# Patient Record
Sex: MALE | Race: WHITE | NOT HISPANIC OR LATINO | Employment: OTHER | URBAN - NONMETROPOLITAN AREA
[De-identification: names, ages, dates, MRNs, and addresses within clinical notes are randomized per-mention and may not be internally consistent; named-entity substitution may affect disease eponyms.]

---

## 2023-03-03 PROBLEM — M54.16 LUMBAR RADICULOPATHY: Status: ACTIVE | Noted: 2023-03-03

## 2023-03-03 PROBLEM — E03.9 HYPOTHYROIDISM: Status: ACTIVE | Noted: 2023-03-03

## 2023-03-03 PROBLEM — M79.642 LEFT HAND PAIN: Status: ACTIVE | Noted: 2023-03-03

## 2023-03-03 PROBLEM — E55.9 VITAMIN D DEFICIENCY: Status: ACTIVE | Noted: 2023-03-03

## 2023-03-03 PROBLEM — L60.0 INGROWN LEFT BIG TOENAIL: Status: ACTIVE | Noted: 2023-03-03

## 2023-03-03 PROBLEM — R00.1 BRADYCARDIA: Status: ACTIVE | Noted: 2023-03-03

## 2023-03-03 PROBLEM — R42 DIZZY SPELLS: Status: ACTIVE | Noted: 2023-03-03

## 2023-03-03 PROBLEM — Z95.1 HISTORY OF CORONARY ARTERY BYPASS GRAFT X 1: Status: ACTIVE | Noted: 2023-03-03

## 2023-03-03 PROBLEM — N40.0 BENIGN ENLARGEMENT OF PROSTATE: Status: ACTIVE | Noted: 2023-03-03

## 2023-03-03 PROBLEM — M25.512 LEFT SHOULDER PAIN: Status: ACTIVE | Noted: 2023-03-03

## 2023-03-03 PROBLEM — M43.10 DEGENERATIVE SPONDYLOLISTHESIS: Status: ACTIVE | Noted: 2023-03-03

## 2023-03-03 PROBLEM — M25.561 BILATERAL KNEE PAIN: Status: ACTIVE | Noted: 2023-03-03

## 2023-03-03 PROBLEM — N64.4 MASTALGIA: Status: ACTIVE | Noted: 2023-03-03

## 2023-03-03 PROBLEM — D49.2 SKIN NEOPLASM: Status: ACTIVE | Noted: 2023-03-03

## 2023-03-03 PROBLEM — M51.36 DISC DEGENERATION, LUMBAR: Status: ACTIVE | Noted: 2023-03-03

## 2023-03-03 PROBLEM — R42 VERTIGO: Status: ACTIVE | Noted: 2023-03-03

## 2023-03-03 PROBLEM — Z95.2 MECHANICAL HEART VALVE PRESENT: Status: ACTIVE | Noted: 2023-03-03

## 2023-03-03 PROBLEM — J45.21 MILD INTERMITTENT ASTHMA WITH ACUTE EXACERBATION (HHS-HCC): Status: ACTIVE | Noted: 2023-03-03

## 2023-03-03 PROBLEM — M54.50 LOWER BACK PAIN: Status: ACTIVE | Noted: 2023-03-03

## 2023-03-03 PROBLEM — M06.4 INFLAMMATORY POLYARTHROPATHY (MULTI): Status: ACTIVE | Noted: 2023-03-03

## 2023-03-03 PROBLEM — R07.9 CHEST PAIN: Status: ACTIVE | Noted: 2023-03-03

## 2023-03-03 PROBLEM — H54.61 VISUAL LOSS, RIGHT EYE: Status: ACTIVE | Noted: 2023-03-03

## 2023-03-03 PROBLEM — H10.33 ACUTE BACTERIAL CONJUNCTIVITIS OF BOTH EYES: Status: ACTIVE | Noted: 2023-03-03

## 2023-03-03 PROBLEM — I25.10 CORONARY ARTERY DISEASE: Status: ACTIVE | Noted: 2023-03-03

## 2023-03-03 PROBLEM — E66.9 CLASS 2 OBESITY WITH BODY MASS INDEX (BMI) OF 39.0 TO 39.9 IN ADULT: Status: ACTIVE | Noted: 2023-03-03

## 2023-03-03 PROBLEM — R35.0 URINARY FREQUENCY: Status: ACTIVE | Noted: 2023-03-03

## 2023-03-03 PROBLEM — M54.40 BILATERAL LOW BACK PAIN WITH SCIATICA: Status: ACTIVE | Noted: 2023-03-03

## 2023-03-03 PROBLEM — R31.9 HEMATURIA: Status: ACTIVE | Noted: 2023-03-03

## 2023-03-03 PROBLEM — Z04.9 CONDITION NOT FOUND: Status: ACTIVE | Noted: 2023-03-03

## 2023-03-03 PROBLEM — R06.02 SHORTNESS OF BREATH AT REST: Status: ACTIVE | Noted: 2023-03-03

## 2023-03-03 PROBLEM — N39.0 URINARY TRACT INFECTION: Status: ACTIVE | Noted: 2023-03-03

## 2023-03-03 PROBLEM — J30.9 ALLERGIC RHINITIS: Status: ACTIVE | Noted: 2023-03-03

## 2023-03-03 PROBLEM — M25.562 BILATERAL KNEE PAIN: Status: ACTIVE | Noted: 2023-03-03

## 2023-03-03 PROBLEM — B35.4 TINEA CORPORIS: Status: ACTIVE | Noted: 2023-03-03

## 2023-03-03 PROBLEM — G25.0 TREMOR, ESSENTIAL: Status: ACTIVE | Noted: 2023-03-03

## 2023-03-03 PROBLEM — M48.02 DEGENERATIVE CERVICAL SPINAL STENOSIS: Status: ACTIVE | Noted: 2023-03-03

## 2023-03-03 PROBLEM — M75.120 COMPLETE ROTATOR CUFF TEAR: Status: ACTIVE | Noted: 2023-03-03

## 2023-03-03 PROBLEM — I83.90 VARICOSE VEIN OF LEG: Status: ACTIVE | Noted: 2023-03-03

## 2023-03-03 PROBLEM — G60.9 HEREDITARY AND IDIOPATHIC NEUROPATHY: Status: ACTIVE | Noted: 2023-03-03

## 2023-03-03 PROBLEM — M51.369 DISC DEGENERATION, LUMBAR: Status: ACTIVE | Noted: 2023-03-03

## 2023-03-03 PROBLEM — M25.559 JOINT PAIN, HIP: Status: ACTIVE | Noted: 2023-03-03

## 2023-03-03 PROBLEM — R42 ORTHOSTATIC DIZZINESS: Status: ACTIVE | Noted: 2023-03-03

## 2023-03-03 PROBLEM — F32.0 CURRENT MILD EPISODE OF MAJOR DEPRESSIVE DISORDER WITHOUT PRIOR EPISODE (CMS-HCC): Status: ACTIVE | Noted: 2023-03-03

## 2023-03-03 PROBLEM — R53.1 WEAKNESS: Status: ACTIVE | Noted: 2023-03-03

## 2023-03-03 PROBLEM — G47.00 INSOMNIA: Status: ACTIVE | Noted: 2023-03-03

## 2023-03-03 PROBLEM — I95.89 SECONDARY HYPOTENSION: Status: ACTIVE | Noted: 2023-03-03

## 2023-03-03 PROBLEM — M47.812 CERVICAL ARTHRITIS: Status: ACTIVE | Noted: 2023-03-03

## 2023-03-03 PROBLEM — M19.029 PRIMARY LOCALIZED OSTEOARTHROSIS, UPPER ARM: Status: ACTIVE | Noted: 2023-03-03

## 2023-03-03 PROBLEM — R25.1 TREMOR OF RIGHT HAND: Status: ACTIVE | Noted: 2023-03-03

## 2023-03-03 PROBLEM — N63.0 LUMP OR MASS IN BREAST: Status: ACTIVE | Noted: 2023-03-03

## 2023-03-03 PROBLEM — E11.9 CONTROLLED TYPE 2 DIABETES MELLITUS WITHOUT COMPLICATION, WITHOUT LONG-TERM CURRENT USE OF INSULIN (MULTI): Status: ACTIVE | Noted: 2023-03-03

## 2023-03-03 PROBLEM — E78.5 HYPERLIPIDEMIA: Status: ACTIVE | Noted: 2023-03-03

## 2023-03-03 PROBLEM — L60.0 INGROWING NAIL: Status: ACTIVE | Noted: 2023-03-03

## 2023-03-03 PROBLEM — B37.2 CANDIDIASIS, CUTANEOUS: Status: ACTIVE | Noted: 2023-03-03

## 2023-03-03 PROBLEM — E78.00 ELEVATED LDL CHOLESTEROL LEVEL: Status: ACTIVE | Noted: 2023-03-03

## 2023-03-03 PROBLEM — I73.9 PERIPHERAL VASCULAR DISEASE (CMS-HCC): Status: ACTIVE | Noted: 2023-03-03

## 2023-03-03 PROBLEM — G47.33 OBSTRUCTIVE SLEEP APNEA ON CPAP: Status: ACTIVE | Noted: 2023-03-03

## 2023-03-03 PROBLEM — R35.0 FREQUENCY OF URINATION: Status: ACTIVE | Noted: 2023-03-03

## 2023-03-03 PROBLEM — R26.89 IMBALANCE: Status: ACTIVE | Noted: 2023-03-03

## 2023-03-03 PROBLEM — R97.20 ABNORMAL PSA: Status: ACTIVE | Noted: 2023-03-03

## 2023-03-03 PROBLEM — J45.990 EXERTIONAL ASTHMA (HHS-HCC): Status: ACTIVE | Noted: 2023-03-03

## 2023-03-03 PROBLEM — M25.531 RIGHT WRIST PAIN: Status: ACTIVE | Noted: 2023-03-03

## 2023-03-03 PROBLEM — E66.9 OBESITY (BMI 30-39.9): Status: ACTIVE | Noted: 2023-03-03

## 2023-03-03 PROBLEM — M13.0 POLYARTHRITIS: Status: ACTIVE | Noted: 2023-03-03

## 2023-03-03 PROBLEM — M48.062 NEUROGENIC CLAUDICATION DUE TO LUMBAR SPINAL STENOSIS: Status: ACTIVE | Noted: 2023-03-03

## 2023-03-03 PROBLEM — M51.16 DISPLACEMENT OF LUMBAR DISC WITH RADICULOPATHY: Status: ACTIVE | Noted: 2023-03-03

## 2023-03-03 PROBLEM — E66.812 CLASS 2 OBESITY WITH BODY MASS INDEX (BMI) OF 39.0 TO 39.9 IN ADULT: Status: ACTIVE | Noted: 2023-03-03

## 2023-03-03 PROBLEM — M67.432 GANGLION OF LEFT WRIST: Status: ACTIVE | Noted: 2023-03-03

## 2023-03-03 PROBLEM — R25.1 TREMOR OF BOTH HANDS: Status: ACTIVE | Noted: 2023-03-03

## 2023-03-03 PROBLEM — L21.9 SEBORRHEIC DERMATITIS: Status: ACTIVE | Noted: 2023-03-03

## 2023-03-03 PROBLEM — I10 BENIGN ESSENTIAL HYPERTENSION: Status: ACTIVE | Noted: 2023-03-03

## 2023-03-03 PROBLEM — S46.911A RIGHT SHOULDER STRAIN: Status: ACTIVE | Noted: 2023-03-03

## 2023-03-03 PROBLEM — G95.9 CERVICAL MYELOPATHY (MULTI): Status: ACTIVE | Noted: 2023-03-03

## 2023-03-03 PROBLEM — S52.501D CLOSED FRACTURE OF LOWER END OF RIGHT RADIUS WITH ROUTINE HEALING: Status: ACTIVE | Noted: 2023-03-03

## 2023-03-03 PROBLEM — M25.532 LEFT WRIST PAIN: Status: ACTIVE | Noted: 2023-03-03

## 2023-03-03 RX ORDER — ROSUVASTATIN CALCIUM 20 MG/1
1 TABLET, COATED ORAL DAILY
COMMUNITY
Start: 2020-04-07 | End: 2023-03-10 | Stop reason: SDUPTHER

## 2023-03-03 RX ORDER — FLUTICASONE PROPIONATE AND SALMETEROL 250; 50 UG/1; UG/1
1 POWDER RESPIRATORY (INHALATION)
COMMUNITY
Start: 2022-06-07 | End: 2023-10-04 | Stop reason: SDUPTHER

## 2023-03-03 RX ORDER — FOLIC ACID 1 MG/1
1 TABLET ORAL DAILY
COMMUNITY
Start: 2018-04-09 | End: 2023-04-09

## 2023-03-03 RX ORDER — BLOOD SUGAR DIAGNOSTIC
1 STRIP MISCELLANEOUS DAILY
COMMUNITY
Start: 2020-11-20 | End: 2023-11-26 | Stop reason: SDUPTHER

## 2023-03-03 RX ORDER — WARFARIN SODIUM 5 MG/1
1.5 TABLET ORAL
COMMUNITY
Start: 2012-06-11 | End: 2023-03-10 | Stop reason: SDUPTHER

## 2023-03-03 RX ORDER — FLUOXETINE HYDROCHLORIDE 20 MG/1
1 CAPSULE ORAL DAILY
COMMUNITY
Start: 2020-10-06 | End: 2023-03-10 | Stop reason: ALTCHOICE

## 2023-03-03 RX ORDER — FUROSEMIDE 20 MG/1
20 TABLET ORAL DAILY
COMMUNITY
End: 2023-03-10 | Stop reason: SDUPTHER

## 2023-03-03 RX ORDER — VIT C/E/ZN/COPPR/LUTEIN/ZEAXAN 250MG-90MG
1 CAPSULE ORAL DAILY
COMMUNITY
End: 2023-05-03 | Stop reason: WASHOUT

## 2023-03-03 RX ORDER — PREGABALIN 200 MG/1
1 CAPSULE ORAL NIGHTLY
COMMUNITY
Start: 2015-12-15 | End: 2023-03-10 | Stop reason: SDUPTHER

## 2023-03-03 RX ORDER — PSYLLIUM HUSK 0.4 G
1 CAPSULE ORAL 2 TIMES DAILY
COMMUNITY
Start: 2014-10-02 | End: 2024-04-18 | Stop reason: ENTERED-IN-ERROR

## 2023-03-03 RX ORDER — HYDROCODONE BITARTRATE AND ACETAMINOPHEN 5; 325 MG/1; MG/1
1 TABLET ORAL 3 TIMES DAILY PRN
COMMUNITY
Start: 2022-11-14 | End: 2023-10-09 | Stop reason: SDUPTHER

## 2023-03-03 RX ORDER — LEVOTHYROXINE SODIUM 137 UG/1
1 TABLET ORAL DAILY
COMMUNITY
Start: 2018-07-23 | End: 2023-03-10 | Stop reason: SDUPTHER

## 2023-03-03 RX ORDER — LOSARTAN POTASSIUM 100 MG/1
1 TABLET ORAL DAILY
COMMUNITY
Start: 2019-10-22 | End: 2023-03-10 | Stop reason: SDUPTHER

## 2023-03-03 RX ORDER — LEFLUNOMIDE 20 MG/1
1 TABLET ORAL DAILY
COMMUNITY
Start: 2021-08-05 | End: 2023-03-10 | Stop reason: SDUPTHER

## 2023-03-03 RX ORDER — METFORMIN HYDROCHLORIDE 850 MG/1
1 TABLET ORAL 2 TIMES DAILY
COMMUNITY
Start: 2018-10-26 | End: 2023-03-10 | Stop reason: ALTCHOICE

## 2023-03-03 RX ORDER — INSULIN PUMP SYRINGE, 3 ML
EACH MISCELLANEOUS
COMMUNITY

## 2023-03-03 RX ORDER — LANCETS 33 GAUGE
1 EACH MISCELLANEOUS DAILY
COMMUNITY
End: 2023-11-26 | Stop reason: SDUPTHER

## 2023-03-03 RX ORDER — ASPIRIN 81 MG/1
1 TABLET ORAL EVERY MORNING
COMMUNITY
Start: 2019-10-22 | End: 2024-04-23 | Stop reason: HOSPADM

## 2023-03-10 DIAGNOSIS — E78.00 ELEVATED LDL CHOLESTEROL LEVEL: ICD-10-CM

## 2023-03-10 DIAGNOSIS — M06.4 INFLAMMATORY POLYARTHROPATHY (MULTI): ICD-10-CM

## 2023-03-10 DIAGNOSIS — G60.9 HEREDITARY AND IDIOPATHIC NEUROPATHY: ICD-10-CM

## 2023-03-10 DIAGNOSIS — E03.9 ACQUIRED HYPOTHYROIDISM: ICD-10-CM

## 2023-03-10 DIAGNOSIS — I10 BENIGN ESSENTIAL HYPERTENSION: ICD-10-CM

## 2023-03-10 DIAGNOSIS — E11.9 CONTROLLED TYPE 2 DIABETES MELLITUS WITHOUT COMPLICATION, WITHOUT LONG-TERM CURRENT USE OF INSULIN (MULTI): ICD-10-CM

## 2023-03-10 DIAGNOSIS — M54.16 LUMBAR RADICULOPATHY: Primary | ICD-10-CM

## 2023-03-10 DIAGNOSIS — F32.1 CURRENT MODERATE EPISODE OF MAJOR DEPRESSIVE DISORDER WITHOUT PRIOR EPISODE (MULTI): ICD-10-CM

## 2023-03-10 DIAGNOSIS — Z95.2 MECHANICAL HEART VALVE PRESENT: ICD-10-CM

## 2023-03-10 RX ORDER — ROSUVASTATIN CALCIUM 20 MG/1
20 TABLET, COATED ORAL DAILY
Qty: 90 TABLET | Refills: 0 | Status: SHIPPED | OUTPATIENT
Start: 2023-03-10 | End: 2023-05-03

## 2023-03-10 RX ORDER — PREGABALIN 200 MG/1
200 CAPSULE ORAL NIGHTLY
Qty: 90 CAPSULE | Refills: 0 | Status: SHIPPED | OUTPATIENT
Start: 2023-03-10 | End: 2023-04-28

## 2023-03-10 RX ORDER — WARFARIN SODIUM 5 MG/1
TABLET ORAL
Qty: 125 TABLET | Refills: 1 | Status: SHIPPED | OUTPATIENT
Start: 2023-03-11 | End: 2023-09-26

## 2023-03-10 RX ORDER — LOSARTAN POTASSIUM 100 MG/1
100 TABLET ORAL DAILY
Qty: 90 TABLET | Refills: 0 | Status: SHIPPED | OUTPATIENT
Start: 2023-03-10 | End: 2023-05-15

## 2023-03-10 RX ORDER — METFORMIN HYDROCHLORIDE 850 MG/1
850 TABLET ORAL 2 TIMES DAILY
COMMUNITY
End: 2023-03-10 | Stop reason: ALTCHOICE

## 2023-03-10 RX ORDER — EMPAGLIFLOZIN 10 MG/1
1 TABLET, FILM COATED ORAL DAILY
COMMUNITY
Start: 2022-03-30 | End: 2023-05-03 | Stop reason: WASHOUT

## 2023-03-10 RX ORDER — FLUOXETINE 20 MG/1
20 TABLET ORAL DAILY
COMMUNITY
End: 2023-03-10 | Stop reason: SDUPTHER

## 2023-03-10 RX ORDER — LEFLUNOMIDE 20 MG/1
20 TABLET ORAL DAILY
Qty: 90 TABLET | Refills: 0 | Status: SHIPPED | OUTPATIENT
Start: 2023-03-10 | End: 2023-05-03 | Stop reason: SDUPTHER

## 2023-03-10 RX ORDER — FLUOXETINE 20 MG/1
20 TABLET ORAL DAILY
Qty: 90 TABLET | Refills: 0 | Status: SHIPPED | OUTPATIENT
Start: 2023-03-10 | End: 2023-05-03 | Stop reason: SDUPTHER

## 2023-03-10 RX ORDER — METFORMIN HYDROCHLORIDE 850 MG/1
850 TABLET ORAL
Qty: 180 TABLET | Refills: 0 | Status: SHIPPED | OUTPATIENT
Start: 2023-03-10 | End: 2023-05-15

## 2023-03-10 RX ORDER — METFORMIN HYDROCHLORIDE 850 MG/1
850 TABLET ORAL 2 TIMES DAILY
Qty: 180 TABLET | Refills: 0 | Status: SHIPPED | OUTPATIENT
Start: 2023-03-10 | End: 2023-05-03 | Stop reason: WASHOUT

## 2023-03-10 RX ORDER — FUROSEMIDE 20 MG/1
20 TABLET ORAL DAILY
Qty: 90 TABLET | Refills: 0 | Status: SHIPPED | OUTPATIENT
Start: 2023-03-10 | End: 2023-05-11

## 2023-03-10 RX ORDER — LEVOTHYROXINE SODIUM 137 UG/1
137 TABLET ORAL DAILY
Qty: 90 TABLET | Refills: 0 | Status: SHIPPED | OUTPATIENT
Start: 2023-03-10 | End: 2023-05-15

## 2023-03-15 ENCOUNTER — CLINICAL SUPPORT (OUTPATIENT)
Dept: PRIMARY CARE | Facility: CLINIC | Age: 73
End: 2023-03-15
Payer: MEDICARE

## 2023-03-15 DIAGNOSIS — Z95.2 HISTORY OF HEART VALVE REPLACEMENT WITH MECHANICAL VALVE: ICD-10-CM

## 2023-03-15 LAB
POC INR: 2.6
POC PROTHROMBIN TIME: NORMAL

## 2023-03-15 PROCEDURE — 85610 PROTHROMBIN TIME: CPT | Performed by: FAMILY MEDICINE

## 2023-03-20 LAB
POC INR: 2.6 (ref 0.9–1.1)
POC PROTHROMBIN TIME: 30.7 (ref 9.3–12.5)

## 2023-04-08 DIAGNOSIS — M06.4 INFLAMMATORY POLYARTHROPATHY (MULTI): Primary | ICD-10-CM

## 2023-04-09 RX ORDER — FOLIC ACID 1 MG/1
TABLET ORAL
Qty: 90 TABLET | Refills: 0 | Status: SHIPPED | OUTPATIENT
Start: 2023-04-09 | End: 2023-05-03 | Stop reason: SDUPTHER

## 2023-04-12 ENCOUNTER — CLINICAL SUPPORT (OUTPATIENT)
Dept: PRIMARY CARE | Facility: CLINIC | Age: 73
End: 2023-04-12
Payer: MEDICARE

## 2023-04-12 ENCOUNTER — ANTICOAGULATION - WARFARIN VISIT (OUTPATIENT)
Dept: PRIMARY CARE | Facility: CLINIC | Age: 73
End: 2023-04-12
Payer: MEDICARE

## 2023-04-12 DIAGNOSIS — I73.9 PVD (PERIPHERAL VASCULAR DISEASE) (CMS-HCC): Primary | ICD-10-CM

## 2023-04-12 DIAGNOSIS — I73.9 PERIPHERAL VASCULAR DISEASE (CMS-HCC): ICD-10-CM

## 2023-04-12 LAB
POC INR: 3.2
POC PROTHROMBIN TIME: 37.9

## 2023-04-12 PROCEDURE — 85610 PROTHROMBIN TIME: CPT | Performed by: FAMILY MEDICINE

## 2023-04-27 DIAGNOSIS — G60.9 HEREDITARY AND IDIOPATHIC NEUROPATHY: ICD-10-CM

## 2023-04-28 RX ORDER — PREGABALIN 200 MG/1
CAPSULE ORAL
Qty: 90 CAPSULE | Refills: 0 | Status: SHIPPED | OUTPATIENT
Start: 2023-04-28 | End: 2023-05-03 | Stop reason: SDUPTHER

## 2023-05-03 ENCOUNTER — OFFICE VISIT (OUTPATIENT)
Dept: PRIMARY CARE | Facility: CLINIC | Age: 73
End: 2023-05-03
Payer: MEDICARE

## 2023-05-03 VITALS
DIASTOLIC BLOOD PRESSURE: 85 MMHG | BODY MASS INDEX: 38.47 KG/M2 | HEART RATE: 60 BPM | HEIGHT: 72 IN | OXYGEN SATURATION: 97 % | WEIGHT: 284 LBS | SYSTOLIC BLOOD PRESSURE: 120 MMHG

## 2023-05-03 DIAGNOSIS — F32.0 CURRENT MILD EPISODE OF MAJOR DEPRESSIVE DISORDER WITHOUT PRIOR EPISODE (CMS-HCC): ICD-10-CM

## 2023-05-03 DIAGNOSIS — G60.9 HEREDITARY AND IDIOPATHIC NEUROPATHY: ICD-10-CM

## 2023-05-03 DIAGNOSIS — M79.10 MYALGIA: ICD-10-CM

## 2023-05-03 DIAGNOSIS — M51.16 DISPLACEMENT OF LUMBAR DISC WITH RADICULOPATHY: ICD-10-CM

## 2023-05-03 DIAGNOSIS — I10 BENIGN ESSENTIAL HYPERTENSION: ICD-10-CM

## 2023-05-03 DIAGNOSIS — E03.9 ACQUIRED HYPOTHYROIDISM: ICD-10-CM

## 2023-05-03 DIAGNOSIS — N40.1 BENIGN PROSTATIC HYPERPLASIA WITH URINARY FREQUENCY: ICD-10-CM

## 2023-05-03 DIAGNOSIS — E11.9 CONTROLLED TYPE 2 DIABETES MELLITUS WITHOUT COMPLICATION, WITHOUT LONG-TERM CURRENT USE OF INSULIN (MULTI): ICD-10-CM

## 2023-05-03 DIAGNOSIS — R35.0 BENIGN PROSTATIC HYPERPLASIA WITH URINARY FREQUENCY: ICD-10-CM

## 2023-05-03 DIAGNOSIS — I25.10 CORONARY ARTERY DISEASE INVOLVING NATIVE CORONARY ARTERY OF NATIVE HEART WITHOUT ANGINA PECTORIS: ICD-10-CM

## 2023-05-03 DIAGNOSIS — M13.0 POLYARTHRITIS: ICD-10-CM

## 2023-05-03 DIAGNOSIS — M06.4 INFLAMMATORY POLYARTHROPATHY (MULTI): ICD-10-CM

## 2023-05-03 DIAGNOSIS — Z95.2 MECHANICAL HEART VALVE PRESENT: ICD-10-CM

## 2023-05-03 DIAGNOSIS — M48.02 DEGENERATIVE CERVICAL SPINAL STENOSIS: Primary | ICD-10-CM

## 2023-05-03 LAB
ALANINE AMINOTRANSFERASE (SGPT) (U/L) IN SER/PLAS: 18 U/L (ref 10–52)
ALBUMIN (G/DL) IN SER/PLAS: 4.2 G/DL (ref 3.4–5)
ALBUMIN (MG/L) IN URINE: 17.3 MG/L
ALBUMIN/CREATININE (UG/MG) IN URINE: 21 UG/MG CRT (ref 0–30)
ALKALINE PHOSPHATASE (U/L) IN SER/PLAS: 100 U/L (ref 33–136)
ANION GAP IN SER/PLAS: 14 MMOL/L (ref 10–20)
ASPARTATE AMINOTRANSFERASE (SGOT) (U/L) IN SER/PLAS: 20 U/L (ref 9–39)
BASOPHILS (10*3/UL) IN BLOOD BY AUTOMATED COUNT: 0.05 X10E9/L (ref 0–0.1)
BASOPHILS/100 LEUKOCYTES IN BLOOD BY AUTOMATED COUNT: 1 % (ref 0–2)
BILIRUBIN TOTAL (MG/DL) IN SER/PLAS: 0.5 MG/DL (ref 0–1.2)
C REACTIVE PROTEIN (MG/L) IN SER/PLAS: 0.51 MG/DL
CALCIUM (MG/DL) IN SER/PLAS: 9.1 MG/DL (ref 8.6–10.3)
CARBON DIOXIDE, TOTAL (MMOL/L) IN SER/PLAS: 22 MMOL/L (ref 21–32)
CHLORIDE (MMOL/L) IN SER/PLAS: 103 MMOL/L (ref 98–107)
CHOLESTEROL (MG/DL) IN SER/PLAS: 177 MG/DL (ref 0–199)
CHOLESTEROL IN HDL (MG/DL) IN SER/PLAS: 42.1 MG/DL
CHOLESTEROL/HDL RATIO: 4.2
CREATINE KINASE (U/L) IN SER/PLAS: 175 U/L (ref 0–325)
CREATININE (MG/DL) IN SER/PLAS: 1.1 MG/DL (ref 0.5–1.3)
CREATININE (MG/DL) IN URINE: 82.4 MG/DL (ref 20–370)
EOSINOPHILS (10*3/UL) IN BLOOD BY AUTOMATED COUNT: 0.17 X10E9/L (ref 0–0.4)
EOSINOPHILS/100 LEUKOCYTES IN BLOOD BY AUTOMATED COUNT: 3.2 % (ref 0–6)
ERYTHROCYTE DISTRIBUTION WIDTH (RATIO) BY AUTOMATED COUNT: 14.2 % (ref 11.5–14.5)
ERYTHROCYTE MEAN CORPUSCULAR HEMOGLOBIN CONCENTRATION (G/DL) BY AUTOMATED: 32.3 G/DL (ref 32–36)
ERYTHROCYTE MEAN CORPUSCULAR VOLUME (FL) BY AUTOMATED COUNT: 95 FL (ref 80–100)
ERYTHROCYTES (10*6/UL) IN BLOOD BY AUTOMATED COUNT: 4.92 X10E12/L (ref 4.5–5.9)
GFR MALE: 71 ML/MIN/1.73M2
GLUCOSE (MG/DL) IN SER/PLAS: 125 MG/DL (ref 74–99)
HEMATOCRIT (%) IN BLOOD BY AUTOMATED COUNT: 46.5 % (ref 41–52)
HEMOGLOBIN (G/DL) IN BLOOD: 15 G/DL (ref 13.5–17.5)
IMMATURE GRANULOCYTES/100 LEUKOCYTES IN BLOOD BY AUTOMATED COUNT: 0.4 % (ref 0–0.9)
LDL: ABNORMAL MG/DL (ref 0–99)
LEUKOCYTES (10*3/UL) IN BLOOD BY AUTOMATED COUNT: 5.3 X10E9/L (ref 4.4–11.3)
LYMPHOCYTES (10*3/UL) IN BLOOD BY AUTOMATED COUNT: 1.42 X10E9/L (ref 0.8–3)
LYMPHOCYTES/100 LEUKOCYTES IN BLOOD BY AUTOMATED COUNT: 27 % (ref 13–44)
MONOCYTES (10*3/UL) IN BLOOD BY AUTOMATED COUNT: 0.64 X10E9/L (ref 0.05–0.8)
MONOCYTES/100 LEUKOCYTES IN BLOOD BY AUTOMATED COUNT: 12.2 % (ref 2–10)
NEUTROPHILS (10*3/UL) IN BLOOD BY AUTOMATED COUNT: 2.95 X10E9/L (ref 1.6–5.5)
NEUTROPHILS/100 LEUKOCYTES IN BLOOD BY AUTOMATED COUNT: 56.2 % (ref 40–80)
NON HDL CHOLESTEROL: 135 MG/DL
PLATELETS (10*3/UL) IN BLOOD AUTOMATED COUNT: 249 X10E9/L (ref 150–450)
POC HEMOGLOBIN A1C: 6.3 % (ref 4.2–6.5)
POC INR: 2.9
POC PROTHROMBIN TIME: NORMAL
POTASSIUM (MMOL/L) IN SER/PLAS: 4.3 MMOL/L (ref 3.5–5.3)
PROSTATE SPECIFIC AG (NG/ML) IN SER/PLAS: 2.7 NG/ML (ref 0–4)
PROTEIN TOTAL: 6.7 G/DL (ref 6.4–8.2)
SEDIMENTATION RATE, ERYTHROCYTE: 15 MM/H (ref 0–20)
SODIUM (MMOL/L) IN SER/PLAS: 135 MMOL/L (ref 136–145)
THYROTROPIN (MIU/L) IN SER/PLAS BY DETECTION LIMIT <= 0.05 MIU/L: 5.49 MIU/L (ref 0.44–3.98)
TRIGLYCERIDE (MG/DL) IN SER/PLAS: 493 MG/DL (ref 0–149)
UREA NITROGEN (MG/DL) IN SER/PLAS: 24 MG/DL (ref 6–23)
VLDL: ABNORMAL MG/DL (ref 0–40)

## 2023-05-03 PROCEDURE — 1160F RVW MEDS BY RX/DR IN RCRD: CPT | Performed by: FAMILY MEDICINE

## 2023-05-03 PROCEDURE — 84153 ASSAY OF PSA TOTAL: CPT

## 2023-05-03 PROCEDURE — 85025 COMPLETE CBC W/AUTO DIFF WBC: CPT

## 2023-05-03 PROCEDURE — 99215 OFFICE O/P EST HI 40 MIN: CPT | Performed by: FAMILY MEDICINE

## 2023-05-03 PROCEDURE — 80061 LIPID PANEL: CPT

## 2023-05-03 PROCEDURE — 82043 UR ALBUMIN QUANTITATIVE: CPT

## 2023-05-03 PROCEDURE — 36415 COLL VENOUS BLD VENIPUNCTURE: CPT

## 2023-05-03 PROCEDURE — 83036 HEMOGLOBIN GLYCOSYLATED A1C: CPT | Performed by: FAMILY MEDICINE

## 2023-05-03 PROCEDURE — 1036F TOBACCO NON-USER: CPT | Performed by: FAMILY MEDICINE

## 2023-05-03 PROCEDURE — 4010F ACE/ARB THERAPY RXD/TAKEN: CPT | Performed by: FAMILY MEDICINE

## 2023-05-03 PROCEDURE — 86140 C-REACTIVE PROTEIN: CPT

## 2023-05-03 PROCEDURE — 1159F MED LIST DOCD IN RCRD: CPT | Performed by: FAMILY MEDICINE

## 2023-05-03 PROCEDURE — 3079F DIAST BP 80-89 MM HG: CPT | Performed by: FAMILY MEDICINE

## 2023-05-03 PROCEDURE — 82570 ASSAY OF URINE CREATININE: CPT

## 2023-05-03 PROCEDURE — 3074F SYST BP LT 130 MM HG: CPT | Performed by: FAMILY MEDICINE

## 2023-05-03 PROCEDURE — 84443 ASSAY THYROID STIM HORMONE: CPT

## 2023-05-03 PROCEDURE — 85652 RBC SED RATE AUTOMATED: CPT

## 2023-05-03 PROCEDURE — 82550 ASSAY OF CK (CPK): CPT

## 2023-05-03 PROCEDURE — 80053 COMPREHEN METABOLIC PANEL: CPT

## 2023-05-03 PROCEDURE — 85610 PROTHROMBIN TIME: CPT | Performed by: FAMILY MEDICINE

## 2023-05-03 RX ORDER — LEFLUNOMIDE 20 MG/1
20 TABLET ORAL DAILY
Qty: 90 TABLET | Refills: 0 | Status: SHIPPED | OUTPATIENT
Start: 2023-05-03 | End: 2023-10-04 | Stop reason: SDUPTHER

## 2023-05-03 RX ORDER — FOLIC ACID 1 MG/1
1 TABLET ORAL DAILY
Qty: 90 TABLET | Refills: 3 | Status: SHIPPED | OUTPATIENT
Start: 2023-05-03 | End: 2023-10-04 | Stop reason: SDUPTHER

## 2023-05-03 RX ORDER — TRIAMCINOLONE ACETONIDE 1 MG/G
1 CREAM TOPICAL 2 TIMES DAILY
COMMUNITY
Start: 2022-06-29 | End: 2023-11-10 | Stop reason: WASHOUT

## 2023-05-03 RX ORDER — KETOCONAZOLE 20 MG/ML
5 SHAMPOO, SUSPENSION TOPICAL 3 TIMES WEEKLY
COMMUNITY
End: 2023-11-10 | Stop reason: WASHOUT

## 2023-05-03 RX ORDER — ACETAMINOPHEN 500 MG
1 TABLET ORAL DAILY
COMMUNITY

## 2023-05-03 RX ORDER — FLUOXETINE 20 MG/1
20 TABLET ORAL DAILY
Qty: 90 TABLET | Refills: 0 | Status: SHIPPED | OUTPATIENT
Start: 2023-05-03 | End: 2023-09-23 | Stop reason: SDUPTHER

## 2023-05-03 RX ORDER — PREGABALIN 200 MG/1
200 CAPSULE ORAL NIGHTLY
Qty: 90 CAPSULE | Refills: 1 | Status: SHIPPED | OUTPATIENT
Start: 2023-05-03 | End: 2023-10-04 | Stop reason: SDUPTHER

## 2023-05-03 RX ORDER — ROSUVASTATIN CALCIUM 10 MG/1
10 TABLET, COATED ORAL DAILY
Qty: 90 TABLET | Refills: 1 | Status: SHIPPED | OUTPATIENT
Start: 2023-05-03 | End: 2023-10-04 | Stop reason: SDUPTHER

## 2023-05-03 ASSESSMENT — PATIENT HEALTH QUESTIONNAIRE - PHQ9
2. FEELING DOWN, DEPRESSED OR HOPELESS: NOT AT ALL
1. LITTLE INTEREST OR PLEASURE IN DOING THINGS: NOT AT ALL
SUM OF ALL RESPONSES TO PHQ9 QUESTIONS 1 AND 2: 0

## 2023-05-03 NOTE — PROGRESS NOTES
"Subjective   Patient ID: Shabbir Laurent is a 72 y.o. male who presents for Hypertension and Diabetes (Checks blood sugars 1 time a day).    HPI for check up many issues  Patient with diabetes  Checks blood sugar once a day extensive log brought in  Also brought in log of blood pressure pulse  Does see ophthalmologist regularly  Longstanding peripheral neuropathy/numbness tingling in feet not necessarily related to diabetes had prior to becoming diabetic  Severe diffuse osteoarthritis  Longstanding intermittent severe back pain/lumbar DDD  Has mechanical heart valve in for PT/INR see Coumadin chart level checked  Patient with AUBREY compliant with CPAP  Patient with myalgias he is on a statin  Has history inflammatory arthritis we will be seeing him rheumatologist next month needs lab performed  Request that it be done today    Review of Systems    Objective   /85   Pulse 60   Ht 1.822 m (5' 11.75\")   Wt 129 kg (284 lb)   SpO2 97%   BMI 38.79 kg/m²     Physical Exam  General: alert, no apparent distress, good hygiene   HEAD:  Normocephalic, atraumatic    EARS:  EAC patent, TMs normal,   EYES:  sclera white, PAYAL, conjunctiva noninjected  NOSE: Nasal passages patent   MOUTH: Pharynx clear, tongue uvula midline, grade 3 airway  Neck:  supple, no masses, thyroid non enlarged non nodular, no cervical adenopathy,  Lungs:  no wheezing, no rales , no rhonchi, normal respiratory pattern, breath sounds not diminished  Heart:  regular rate and  rhythm, 3/6 RUSB  murmur, mechanical valve no ectopy, no S3 or S4, no carotid bruits  Abdomen:  soft NT,BS + ,  no organomegaly, no masses, no bruits  Extremities:  no edema, no cyanosis, no clubbing,  2+ posterior tibialis pulse    Psych:  speech fluent, normal affect, normal thought process  Skin:  no rashes, no concerning skin lesions, normal texture        Assessment/Plan   Problem List Items Addressed This Visit       Benign enlargement of prostate    Relevant Orders    " Prostate Specific Antigen    POCT INR manually resulted (Completed)    Benign essential hypertension    Relevant Orders    CBC and Auto Differential (Completed)    Comprehensive Metabolic Panel (Completed)    POCT INR manually resulted (Completed)    Controlled type 2 diabetes mellitus without complication, without long-term current use of insulin (CMS/[facility])    Relevant Orders    POCT glycosylated hemoglobin (Hb A1C) manually resulted (Completed)    Albumin , Urine Random (Completed)    POCT INR manually resulted (Completed)    Coronary artery disease    Relevant Medications    folic acid (Folvite) 1 mg tablet    rosuvastatin (Crestor) 10 mg tablet    Other Relevant Orders    Comprehensive Metabolic Panel (Completed)    Lipid Panel (Completed)    POCT INR manually resulted (Completed)    Current mild episode of major depressive disorder without prior episode (CMS/[facility])    Relevant Medications    FLUoxetine (PROzac) 20 mg tablet    Other Relevant Orders    POCT INR manually resulted (Completed)    Degenerative cervical spinal stenosis - Primary    Relevant Orders    POCT INR manually resulted (Completed)    Displacement of lumbar disc with radiculopathy    Relevant Orders    POCT INR manually resulted (Completed)    Hereditary and idiopathic neuropathy    Relevant Medications    folic acid (Folvite) 1 mg tablet    pregabalin (Lyrica) 200 mg capsule    Other Relevant Orders    POCT INR manually resulted (Completed)    Hypothyroidism    Relevant Orders    Thyroid Stimulating Hormone (Completed)    POCT INR manually resulted (Completed)    Inflammatory polyarthropathy (CMS/[facility])    Relevant Medications    folic acid (Folvite) 1 mg tablet    leflunomide (Arava) 20 mg tablet    Other Relevant Orders    POCT INR manually resulted (Completed)    Mechanical heart valve present    Relevant Orders    POCT INR manually resulted (Completed)    Polyarthritis    Relevant Medications    leflunomide (Arava) 20 mg tablet    Other  Relevant Orders    CBC and Auto Differential (Completed)    Sedimentation Rate (Completed)    C-reactive protein (Completed)    POCT INR manually resulted (Completed)     Other Visit Diagnoses       Myalgia        Relevant Orders    Creatine Kinase (Completed)    POCT INR manually resulted (Completed)        Mechanical heart valve PT/INR done see Coumadin sheet/chart.  Medications reviewed.  Lab work performed.

## 2023-05-10 ENCOUNTER — APPOINTMENT (OUTPATIENT)
Dept: PRIMARY CARE | Facility: CLINIC | Age: 73
End: 2023-05-10
Payer: MEDICARE

## 2023-05-11 ENCOUNTER — APPOINTMENT (OUTPATIENT)
Dept: PRIMARY CARE | Facility: CLINIC | Age: 73
End: 2023-05-11
Payer: MEDICARE

## 2023-05-14 DIAGNOSIS — I10 BENIGN ESSENTIAL HYPERTENSION: ICD-10-CM

## 2023-05-14 DIAGNOSIS — E11.9 CONTROLLED TYPE 2 DIABETES MELLITUS WITHOUT COMPLICATION, WITHOUT LONG-TERM CURRENT USE OF INSULIN (MULTI): ICD-10-CM

## 2023-05-14 DIAGNOSIS — E03.9 ACQUIRED HYPOTHYROIDISM: ICD-10-CM

## 2023-05-14 DIAGNOSIS — E11.42 DIABETIC POLYNEUROPATHY ASSOCIATED WITH TYPE 2 DIABETES MELLITUS (MULTI): ICD-10-CM

## 2023-05-15 RX ORDER — LOSARTAN POTASSIUM 100 MG/1
TABLET ORAL
Qty: 90 TABLET | Refills: 3 | Status: SHIPPED | OUTPATIENT
Start: 2023-05-15 | End: 2023-10-04 | Stop reason: SDUPTHER

## 2023-05-15 RX ORDER — LEVOTHYROXINE SODIUM 137 UG/1
TABLET ORAL
Qty: 90 TABLET | Refills: 3 | Status: SHIPPED | OUTPATIENT
Start: 2023-05-15 | End: 2023-10-04 | Stop reason: SDUPTHER

## 2023-05-15 RX ORDER — EMPAGLIFLOZIN 25 MG/1
TABLET, FILM COATED ORAL
Qty: 90 TABLET | Refills: 3 | Status: SHIPPED | OUTPATIENT
Start: 2023-05-15

## 2023-05-15 RX ORDER — METFORMIN HYDROCHLORIDE 850 MG/1
TABLET ORAL
Qty: 180 TABLET | Refills: 3 | Status: SHIPPED | OUTPATIENT
Start: 2023-05-15 | End: 2023-10-04 | Stop reason: SDUPTHER

## 2023-06-01 ENCOUNTER — APPOINTMENT (OUTPATIENT)
Dept: PRIMARY CARE | Facility: CLINIC | Age: 73
End: 2023-06-01
Payer: MEDICARE

## 2023-07-05 ENCOUNTER — CLINICAL SUPPORT (OUTPATIENT)
Dept: PRIMARY CARE | Facility: CLINIC | Age: 73
End: 2023-07-05
Payer: MEDICARE

## 2023-07-05 DIAGNOSIS — Z95.2 MECHANICAL HEART VALVE PRESENT: ICD-10-CM

## 2023-07-05 LAB
POC INR: 3.6
POC PROTHROMBIN TIME: NORMAL

## 2023-07-05 PROCEDURE — 85610 PROTHROMBIN TIME: CPT | Performed by: FAMILY MEDICINE

## 2023-08-02 ENCOUNTER — ANTICOAGULATION - WARFARIN VISIT (OUTPATIENT)
Dept: PRIMARY CARE | Facility: CLINIC | Age: 73
End: 2023-08-02
Payer: COMMERCIAL

## 2023-08-02 ENCOUNTER — APPOINTMENT (OUTPATIENT)
Dept: PRIMARY CARE | Facility: CLINIC | Age: 73
End: 2023-08-02
Payer: MEDICARE

## 2023-08-02 DIAGNOSIS — Z95.2 MECHANICAL HEART VALVE PRESENT: ICD-10-CM

## 2023-08-02 LAB
POC INR: 3.3
POC PROTHROMBIN TIME: NORMAL

## 2023-08-30 ENCOUNTER — CLINICAL SUPPORT (OUTPATIENT)
Dept: PRIMARY CARE | Facility: CLINIC | Age: 73
End: 2023-08-30
Payer: MEDICARE

## 2023-08-30 DIAGNOSIS — Z95.2 MECHANICAL HEART VALVE PRESENT: ICD-10-CM

## 2023-08-30 LAB
POC INR: 3.5
POC PROTHROMBIN TIME: NORMAL

## 2023-08-30 PROCEDURE — 85610 PROTHROMBIN TIME: CPT | Performed by: FAMILY MEDICINE

## 2023-09-23 ENCOUNTER — PATIENT MESSAGE (OUTPATIENT)
Dept: PRIMARY CARE | Facility: CLINIC | Age: 73
End: 2023-09-23
Payer: COMMERCIAL

## 2023-09-23 DIAGNOSIS — F32.0 CURRENT MILD EPISODE OF MAJOR DEPRESSIVE DISORDER WITHOUT PRIOR EPISODE (CMS-HCC): ICD-10-CM

## 2023-09-23 RX ORDER — FLUOXETINE 20 MG/1
20 TABLET ORAL DAILY
Qty: 90 TABLET | Refills: 0 | Status: SHIPPED | OUTPATIENT
Start: 2023-09-23 | End: 2023-10-04 | Stop reason: SDUPTHER

## 2023-09-26 DIAGNOSIS — Z95.2 MECHANICAL HEART VALVE PRESENT: ICD-10-CM

## 2023-09-26 RX ORDER — WARFARIN SODIUM 5 MG/1
TABLET ORAL
Qty: 123 TABLET | Refills: 3 | Status: SHIPPED | OUTPATIENT
Start: 2023-09-26 | End: 2023-10-04 | Stop reason: SDUPTHER

## 2023-09-27 ENCOUNTER — CLINICAL SUPPORT (OUTPATIENT)
Dept: PRIMARY CARE | Facility: CLINIC | Age: 73
End: 2023-09-27
Payer: MEDICARE

## 2023-09-27 DIAGNOSIS — Z95.2 MECHANICAL HEART VALVE PRESENT: ICD-10-CM

## 2023-09-27 LAB
POC INR: 3.1
POC PROTHROMBIN TIME: NORMAL

## 2023-09-27 PROCEDURE — 85610 PROTHROMBIN TIME: CPT | Performed by: FAMILY MEDICINE

## 2023-10-04 ENCOUNTER — OFFICE VISIT (OUTPATIENT)
Dept: PRIMARY CARE | Facility: CLINIC | Age: 73
End: 2023-10-04
Payer: MEDICARE

## 2023-10-04 VITALS
DIASTOLIC BLOOD PRESSURE: 64 MMHG | WEIGHT: 282 LBS | BODY MASS INDEX: 38.19 KG/M2 | HEIGHT: 72 IN | HEART RATE: 50 BPM | OXYGEN SATURATION: 96 % | SYSTOLIC BLOOD PRESSURE: 139 MMHG

## 2023-10-04 DIAGNOSIS — G60.9 HEREDITARY AND IDIOPATHIC NEUROPATHY: ICD-10-CM

## 2023-10-04 DIAGNOSIS — M06.4 INFLAMMATORY POLYARTHROPATHY (MULTI): ICD-10-CM

## 2023-10-04 DIAGNOSIS — J45.21 MILD INTERMITTENT ASTHMA WITH ACUTE EXACERBATION (HHS-HCC): ICD-10-CM

## 2023-10-04 DIAGNOSIS — E66.9 OBESITY (BMI 30-39.9): ICD-10-CM

## 2023-10-04 DIAGNOSIS — I25.10 CORONARY ARTERY DISEASE INVOLVING NATIVE CORONARY ARTERY OF NATIVE HEART WITHOUT ANGINA PECTORIS: Primary | ICD-10-CM

## 2023-10-04 DIAGNOSIS — E11.9 CONTROLLED TYPE 2 DIABETES MELLITUS WITHOUT COMPLICATION, WITHOUT LONG-TERM CURRENT USE OF INSULIN (MULTI): ICD-10-CM

## 2023-10-04 DIAGNOSIS — I10 BENIGN ESSENTIAL HYPERTENSION: ICD-10-CM

## 2023-10-04 DIAGNOSIS — I73.9 PERIPHERAL VASCULAR DISEASE (CMS-HCC): ICD-10-CM

## 2023-10-04 DIAGNOSIS — M13.0 POLYARTHRITIS: ICD-10-CM

## 2023-10-04 DIAGNOSIS — E03.9 ACQUIRED HYPOTHYROIDISM: ICD-10-CM

## 2023-10-04 DIAGNOSIS — Z95.2 MECHANICAL HEART VALVE PRESENT: ICD-10-CM

## 2023-10-04 DIAGNOSIS — F32.0 CURRENT MILD EPISODE OF MAJOR DEPRESSIVE DISORDER WITHOUT PRIOR EPISODE (CMS-HCC): ICD-10-CM

## 2023-10-04 PROBLEM — E66.812 CLASS 2 OBESITY WITH BODY MASS INDEX (BMI) OF 39.0 TO 39.9 IN ADULT: Status: RESOLVED | Noted: 2023-03-03 | Resolved: 2023-10-04

## 2023-10-04 PROBLEM — L60.0 INGROWING NAIL: Status: RESOLVED | Noted: 2023-03-03 | Resolved: 2023-10-04

## 2023-10-04 LAB
CHOLEST SERPL-MCNC: 169 MG/DL (ref 0–199)
CHOLESTEROL/HDL RATIO: 4
HDLC SERPL-MCNC: 42.7 MG/DL
LDLC SERPL CALC-MCNC: ABNORMAL MG/DL
NON HDL CHOLESTEROL: 126 MG/DL (ref 0–149)
POC HEMOGLOBIN A1C: 6.7 % (ref 4.2–6.5)
TRIGL SERPL-MCNC: 454 MG/DL (ref 0–149)
VLDL: ABNORMAL

## 2023-10-04 PROCEDURE — 3078F DIAST BP <80 MM HG: CPT | Performed by: FAMILY MEDICINE

## 2023-10-04 PROCEDURE — 80061 LIPID PANEL: CPT

## 2023-10-04 PROCEDURE — 1159F MED LIST DOCD IN RCRD: CPT | Performed by: FAMILY MEDICINE

## 2023-10-04 PROCEDURE — 36415 COLL VENOUS BLD VENIPUNCTURE: CPT

## 2023-10-04 PROCEDURE — G0008 ADMIN INFLUENZA VIRUS VAC: HCPCS | Performed by: FAMILY MEDICINE

## 2023-10-04 PROCEDURE — 83036 HEMOGLOBIN GLYCOSYLATED A1C: CPT | Performed by: FAMILY MEDICINE

## 2023-10-04 PROCEDURE — 1036F TOBACCO NON-USER: CPT | Performed by: FAMILY MEDICINE

## 2023-10-04 PROCEDURE — 3075F SYST BP GE 130 - 139MM HG: CPT | Performed by: FAMILY MEDICINE

## 2023-10-04 PROCEDURE — 1160F RVW MEDS BY RX/DR IN RCRD: CPT | Performed by: FAMILY MEDICINE

## 2023-10-04 PROCEDURE — G0439 PPPS, SUBSEQ VISIT: HCPCS | Performed by: FAMILY MEDICINE

## 2023-10-04 PROCEDURE — 4010F ACE/ARB THERAPY RXD/TAKEN: CPT | Performed by: FAMILY MEDICINE

## 2023-10-04 PROCEDURE — 90662 IIV NO PRSV INCREASED AG IM: CPT | Performed by: FAMILY MEDICINE

## 2023-10-04 PROCEDURE — 99214 OFFICE O/P EST MOD 30 MIN: CPT | Performed by: FAMILY MEDICINE

## 2023-10-04 PROCEDURE — 1170F FXNL STATUS ASSESSED: CPT | Performed by: FAMILY MEDICINE

## 2023-10-04 RX ORDER — PSYLLIUM HUSK 3.4 G/5.8G
POWDER ORAL
COMMUNITY
End: 2023-11-10 | Stop reason: WASHOUT

## 2023-10-04 RX ORDER — VIT C/E/ZN/COPPR/LUTEIN/ZEAXAN 250MG-90MG
1 CAPSULE ORAL DAILY
COMMUNITY
End: 2023-11-10 | Stop reason: WASHOUT

## 2023-10-04 RX ORDER — FUROSEMIDE 20 MG/1
20 TABLET ORAL DAILY
Qty: 90 TABLET | Refills: 3 | Status: SHIPPED | OUTPATIENT
Start: 2023-10-04 | End: 2023-11-10 | Stop reason: WASHOUT

## 2023-10-04 RX ORDER — FLUOXETINE 20 MG/1
20 TABLET ORAL DAILY
Qty: 90 TABLET | Refills: 1 | Status: SHIPPED | OUTPATIENT
Start: 2023-10-04 | End: 2023-10-09 | Stop reason: ALTCHOICE

## 2023-10-04 RX ORDER — LEFLUNOMIDE 20 MG/1
20 TABLET ORAL DAILY
Qty: 90 TABLET | Refills: 1 | Status: SHIPPED | OUTPATIENT
Start: 2023-10-04 | End: 2024-04-06

## 2023-10-04 RX ORDER — WARFARIN SODIUM 5 MG/1
TABLET ORAL
Qty: 125 TABLET | Refills: 3 | Status: SHIPPED | OUTPATIENT
Start: 2023-10-04

## 2023-10-04 RX ORDER — FOLIC ACID 1 MG/1
1 TABLET ORAL DAILY
Qty: 90 TABLET | Refills: 3 | Status: SHIPPED | OUTPATIENT
Start: 2023-10-04 | End: 2024-10-03

## 2023-10-04 RX ORDER — ROSUVASTATIN CALCIUM 10 MG/1
10 TABLET, COATED ORAL DAILY
Qty: 90 TABLET | Refills: 3 | Status: SHIPPED | OUTPATIENT
Start: 2023-10-04 | End: 2024-01-16 | Stop reason: ALTCHOICE

## 2023-10-04 RX ORDER — LEVOTHYROXINE SODIUM 137 UG/1
137 TABLET ORAL DAILY
Qty: 90 TABLET | Refills: 3 | Status: SHIPPED | OUTPATIENT
Start: 2023-10-04 | End: 2024-10-03

## 2023-10-04 RX ORDER — FLUOXETINE HYDROCHLORIDE 20 MG/1
20 CAPSULE ORAL DAILY
COMMUNITY
Start: 2023-09-27 | End: 2023-10-09 | Stop reason: SDUPTHER

## 2023-10-04 RX ORDER — METFORMIN HYDROCHLORIDE 850 MG/1
850 TABLET ORAL
Qty: 180 TABLET | Refills: 3 | Status: SHIPPED | OUTPATIENT
Start: 2023-10-04

## 2023-10-04 RX ORDER — PREGABALIN 200 MG/1
200 CAPSULE ORAL NIGHTLY
Qty: 90 CAPSULE | Refills: 1 | Status: SHIPPED | OUTPATIENT
Start: 2023-10-04 | End: 2024-04-26

## 2023-10-04 RX ORDER — ROSUVASTATIN CALCIUM 20 MG/1
1 TABLET, COATED ORAL DAILY
COMMUNITY
Start: 2023-05-03 | End: 2024-01-17 | Stop reason: ALTCHOICE

## 2023-10-04 RX ORDER — FLUOCINONIDE 0.5 MG/G
CREAM TOPICAL
COMMUNITY
Start: 2017-01-17 | End: 2023-11-10 | Stop reason: WASHOUT

## 2023-10-04 RX ORDER — LOSARTAN POTASSIUM 100 MG/1
100 TABLET ORAL DAILY
Qty: 90 TABLET | Refills: 3 | Status: SHIPPED | OUTPATIENT
Start: 2023-10-04 | End: 2024-10-03

## 2023-10-04 RX ORDER — FLUTICASONE PROPIONATE AND SALMETEROL 250; 50 UG/1; UG/1
1 POWDER RESPIRATORY (INHALATION)
Qty: 180 EACH | Refills: 3 | Status: SHIPPED | OUTPATIENT
Start: 2023-10-04 | End: 2023-10-04 | Stop reason: SDUPTHER

## 2023-10-04 ASSESSMENT — ENCOUNTER SYMPTOMS
SHORTNESS OF BREATH: 0
DIARRHEA: 0
BACK PAIN: 1
UNEXPECTED WEIGHT CHANGE: 0
APPETITE CHANGE: 0
ACTIVITY CHANGE: 1
CHEST TIGHTNESS: 0
SINUS PRESSURE: 0
WHEEZING: 0
FREQUENCY: 1
PALPITATIONS: 0
ARTHRALGIAS: 1
COLOR CHANGE: 0
ABDOMINAL DISTENTION: 0
DIFFICULTY URINATING: 0
ABDOMINAL PAIN: 0

## 2023-10-04 ASSESSMENT — ACTIVITIES OF DAILY LIVING (ADL)
DOING_HOUSEWORK: INDEPENDENT
BATHING: INDEPENDENT
DRESSING: INDEPENDENT
MANAGING_FINANCES: INDEPENDENT
GROCERY_SHOPPING: INDEPENDENT
TAKING_MEDICATION: INDEPENDENT

## 2023-10-04 ASSESSMENT — PATIENT HEALTH QUESTIONNAIRE - PHQ9
1. LITTLE INTEREST OR PLEASURE IN DOING THINGS: NOT AT ALL
2. FEELING DOWN, DEPRESSED OR HOPELESS: NOT AT ALL
SUM OF ALL RESPONSES TO PHQ9 QUESTIONS 1 AND 2: 0

## 2023-10-04 NOTE — PROGRESS NOTES
MRI/Xray Review  Subjective     Shabbir Laurent is a 73 y.o. year old male patient here for chronic pain management.     Follows up for interval reevaluation of his chronic low back pain from lumbar stenosis with neurogenic claudication, degenerative disc and degenerative spondylolisthesis.    Over the last 6 months Jamie is with increased right-sided greater than left-sided low back pain that radiates to the right and left anterolateral hip and thigh with numbness and weakness.  Pain can be as high as a 7 out of 10 with standing and walking, walking up and down stairs, getting in and out of the car.  Standing is limited between 5 to 10 minutes due to back and leg pain and fatigue.      Did have a brief course of oral prednisone July 2023 that reduce pain and improve function up to 50% while on the medication.  Pain returned when treatment was completed.  Continues physical therapy at home for his low back.  Practices activity modification.  Applies heat to low back.  Takes medication as prescribed.  Despite conservative measures continues to have worsening low back and leg pain with standing and walking.    Reviewed x-rays of lumbar spine and hip and pelvis August 27, 2023.  Jamie is with arthritis of the lumbar spine and hip.    Reviewed MRI of lumbar spine August 27, 2023.  Which is with stable degenerative changes most pronounced at L2-L3 with superimposed inferior directed right revision, facet hypertrophy, ligament flavum thickening and prominent epidural fat with severe spinal canal narrowing and left and severe right neuroforaminal narrowing, similar to previous.    History of bilateral L3 transforaminal lumbar epidural steroid injection April 28, 2022 reduced pain and improved function at 80% initially and then down to 50% until 6 months ago.    History of difficulty following injection with Lovenox bridge and monitoring anticoagulation medication.  Because of this will only pursue injection therapy when leg  pain is involved as it has been for the last 6 months.    Is currently on warfarin.  Will bridge to Lovenox and discontinue 24 hours prior to injection.  Will notify Dr. Mock of injection therapy and anticoagulation plan.    Discussed risks and benefits as above, will plan on bilateral L3 transforaminal lumbar epidural steroid injection for lumbar stenosis with neurogenic claudication.    Norco 5 mg 3 times daily reduces pain and improves function up to 50%.  Average pain score is 6 out of 10.  Denies any side effects from medication.  Has an average quality of family and social life with current condition and treatment.    Toxicology consistent March 8, 2023.  Annual controlled substance agreement and opioid risk tool are completed and scanned into the chart March 8, 2023.    Review of Systems   Constitutional: Negative.    Respiratory: Negative.     Cardiovascular: Negative.    Gastrointestinal: Negative.    Endocrine: Negative.    Genitourinary: Negative.    Musculoskeletal:  Positive for arthralgias, back pain, gait problem and myalgias.   Skin:  Negative for color change.   Allergic/Immunologic: Negative.    Neurological:  Positive for weakness and numbness. Negative for dizziness, tremors, seizures, syncope, facial asymmetry, speech difficulty, light-headedness and headaches.   Hematological: Negative.    Psychiatric/Behavioral: Negative.         Objective   Physical Exam  Vitals and nursing note reviewed.   Constitutional:       Appearance: Normal appearance.   HENT:      Head: Normocephalic and atraumatic.   Cardiovascular:      Pulses: Normal pulses.   Pulmonary:      Effort: Pulmonary effort is normal. No respiratory distress.   Musculoskeletal:      Right lower leg: No edema.      Left lower leg: No edema.      Comments: Standing erect and lumbar spine in extension increased back pain.  Flexion relieves back pain.    Ambulates with mildly antalgic gait.   Skin:     General: Skin is warm and dry.       Capillary Refill: Capillary refill takes 2 to 3 seconds.   Neurological:      General: No focal deficit present.      Mental Status: He is alert.      Sensory: Sensory deficit present.      Motor: Weakness present.      Gait: Gait abnormal.      Comments: Allodynia to light touch L3 dermatomal distribution to right and left lower extremities.    Weakness with right hip flexion 4 out of 5 compared to left.      Negative straight leg raise.  No clonus   Psychiatric:         Mood and Affect: Mood normal.         Behavior: Behavior normal.       Assessment/Plan   Problem List Items Addressed This Visit             ICD-10-CM    Neurogenic claudication due to lumbar spinal stenosis M48.062     Other Visit Diagnoses         Codes    Herniated nucleus pulposus, L2-3    -  Primary M51.26    Osteoarthritis of spine with radiculopathy, lumbar region     M47.26          Once we have okay from Dr. Mock and anticoagulation plan will call and place you on schedule.  INR must be less than 1.3 date of service.  Take your diazepam 5 mg 1 hour prior to injection and once you arrive.  Continue with your Norco 5 mg 3 times daily.  Can add acetaminophen throughout the day for additional pain relief.    Follow-up in 12 weeks.    Alma RENTERIA-CNP

## 2023-10-04 NOTE — PROGRESS NOTES
Subjective   Reason for Visit: Shabbir Laurent is an 73 y.o. male here for a Medicare Wellness visit.     Past Medical, Surgical, and Family History reviewed and updated in chart.    Reviewed all medications by prescribing practitioner or clinical pharmacist (such as prescriptions, OTCs, herbal therapies and supplements) and documented in the medical record.    HPI  Back and hip pain  Limits his activity  Had MRI and hip x rays   Seeing pain management next week  Major struggle to walk or just stand up for longer than 10 minutes   Appt CV next month with echo valve 20 yrs old aortic       Patient Care Team:  Karl Mock DO as PCP - General  [facility] Mock DO [facility]     Review of Systems   Constitutional:  Positive for activity change. Negative for appetite change and unexpected weight change.        Very limited activity because of his back and hip pain   HENT:  Positive for hearing loss. Negative for congestion, postnasal drip and sinus pressure.    Eyes:  Negative for visual disturbance.   Respiratory:  Negative for chest tightness, shortness of breath and wheezing.    Cardiovascular:  Negative for chest pain, palpitations and leg swelling.   Gastrointestinal:  Negative for abdominal distention, abdominal pain and diarrhea.   Genitourinary:  Positive for frequency. Negative for decreased urine volume and difficulty urinating.   Musculoskeletal:  Positive for arthralgias and back pain.   Skin:  Negative for color change, pallor and rash.       Objective   Vitals:  /64   Pulse 50   Ht 1.829 m (6')   Wt 128 kg (282 lb)   SpO2 96%   BMI 38.25 kg/m²       Physical Exam  General: alert, no apparent distress, good hygiene   HEAD:  Normocephalic, atraumatic    EARS:  EAC patent, TMs normal,   EYES:  sclera white, PAYAL, conjunctiva noninjected  NOSE: Nasal passages patent   MOUTH: Pharynx clear, tongue uvula midline, grade 4 airway  Neck:  supple, no masses, thyroid non  enlarged non nodular, no cervical adenopathy,  Lungs:  no wheezing, no rales , no rhonchi, normal respiratory pattern, breath sounds not diminished  Heart:  regular rate and  rhythm, 2/6 RUSB murmur, frequent  ectopy,mechanical valve  no S3 or S4, no carotid bruits  Abdomen:  soft NT,BS + ,  no organomegaly, no masses, no bruits  Extremities:  trace  edema, no cyanosis, no clubbing,  2+ posterior tibialis pulse    Psych:  speech fluent, normal affect, normal thought process  Skin:  stasis changes both lower legs no rashes, no concerning skin lesions, normal texture    Assessment/Plan   Problem List Items Addressed This Visit       Benign essential hypertension    Relevant Medications    furosemide (Lasix) 20 mg tablet    losartan (Cozaar) 100 mg tablet    Controlled type 2 diabetes mellitus without complication, without long-term current use of insulin (CMS/HCC)    Relevant Medications    metFORMIN (Glucophage) 850 mg tablet    Other Relevant Orders    POCT glycosylated hemoglobin (Hb A1C) manually resulted (Completed)    Coronary artery disease - Primary    Relevant Medications    folic acid (Folvite) 1 mg tablet    rosuvastatin (Crestor) 10 mg tablet    Other Relevant Orders    Lipid Panel (Completed)    Current mild episode of major depressive disorder without prior episode (CMS/HCC)    Relevant Medications    FLUoxetine (PROzac) 20 mg tablet    Hereditary and idiopathic neuropathy    Relevant Medications    folic acid (Folvite) 1 mg tablet    pregabalin (Lyrica) 200 mg capsule    Hypothyroidism    Relevant Medications    levothyroxine (Synthroid, Levoxyl) 137 mcg tablet    Inflammatory polyarthropathy (CMS/HCC)    Relevant Medications    folic acid (Folvite) 1 mg tablet    leflunomide (Arava) 20 mg tablet    Mechanical heart valve present    Relevant Medications    warfarin (Coumadin) 5 mg tablet    Mild intermittent asthma with acute exacerbation    Relevant Medications    fluticasone propion-salmeteroL (Advair  Diskus) 250-50 mcg/dose diskus inhaler    Obesity (BMI 30-39.9)    Peripheral vascular disease (CMS/HCC)    Polyarthritis    Relevant Medications    leflunomide (Arava) 20 mg tablet   Patient with severe back pain hip pain very much limiting his activity.  Under pain management.  Generic Lyrica helps as recommended by pain management.  With inflammatory arthritis under the care of rheumatology doing well with current regimen.  Longstanding neuropathy does not seem worse although he had an episode recently where he was walking up to slipper behind it did not even realize it.   Patient encouraged to have RSV and COVID vaccines which they will have.  Flu vaccine given in office  Medications renewed

## 2023-10-09 ENCOUNTER — OFFICE VISIT (OUTPATIENT)
Dept: PAIN MEDICINE | Facility: CLINIC | Age: 73
End: 2023-10-09
Payer: MEDICARE

## 2023-10-09 VITALS — DIASTOLIC BLOOD PRESSURE: 93 MMHG | SYSTOLIC BLOOD PRESSURE: 166 MMHG | HEART RATE: 85 BPM | RESPIRATION RATE: 16 BRPM

## 2023-10-09 DIAGNOSIS — F32.0 CURRENT MILD EPISODE OF MAJOR DEPRESSIVE DISORDER WITHOUT PRIOR EPISODE (CMS-HCC): ICD-10-CM

## 2023-10-09 DIAGNOSIS — M48.062 NEUROGENIC CLAUDICATION DUE TO LUMBAR SPINAL STENOSIS: Primary | ICD-10-CM

## 2023-10-09 DIAGNOSIS — M51.26 HERNIATED NUCLEUS PULPOSUS, L2-3: ICD-10-CM

## 2023-10-09 DIAGNOSIS — F32.0 CURRENT MILD EPISODE OF MAJOR DEPRESSIVE DISORDER WITHOUT PRIOR EPISODE (CMS-HCC): Primary | ICD-10-CM

## 2023-10-09 DIAGNOSIS — M47.26 OSTEOARTHRITIS OF SPINE WITH RADICULOPATHY, LUMBAR REGION: ICD-10-CM

## 2023-10-09 PROCEDURE — 3080F DIAST BP >= 90 MM HG: CPT | Performed by: NURSE PRACTITIONER

## 2023-10-09 PROCEDURE — 3077F SYST BP >= 140 MM HG: CPT | Performed by: NURSE PRACTITIONER

## 2023-10-09 PROCEDURE — 1036F TOBACCO NON-USER: CPT | Performed by: NURSE PRACTITIONER

## 2023-10-09 PROCEDURE — 99214 OFFICE O/P EST MOD 30 MIN: CPT | Performed by: NURSE PRACTITIONER

## 2023-10-09 PROCEDURE — 1159F MED LIST DOCD IN RCRD: CPT | Performed by: NURSE PRACTITIONER

## 2023-10-09 PROCEDURE — 1125F AMNT PAIN NOTED PAIN PRSNT: CPT | Performed by: NURSE PRACTITIONER

## 2023-10-09 PROCEDURE — 1160F RVW MEDS BY RX/DR IN RCRD: CPT | Performed by: NURSE PRACTITIONER

## 2023-10-09 PROCEDURE — 4010F ACE/ARB THERAPY RXD/TAKEN: CPT | Performed by: NURSE PRACTITIONER

## 2023-10-09 RX ORDER — FLUOXETINE HYDROCHLORIDE 20 MG/1
20 CAPSULE ORAL DAILY
Qty: 90 CAPSULE | Refills: 3 | Status: SHIPPED | OUTPATIENT
Start: 2023-10-09 | End: 2023-11-26 | Stop reason: SDUPTHER

## 2023-10-09 RX ORDER — HYDROCODONE BITARTRATE AND ACETAMINOPHEN 5; 325 MG/1; MG/1
1 TABLET ORAL 3 TIMES DAILY PRN
Qty: 84 TABLET | Refills: 0 | Status: SHIPPED | OUTPATIENT
Start: 2023-10-17 | End: 2023-11-14 | Stop reason: SDUPTHER

## 2023-10-09 RX ORDER — DIAZEPAM 5 MG/1
TABLET ORAL
Qty: 2 TABLET | Refills: 0 | Status: SHIPPED | OUTPATIENT
Start: 2023-10-09 | End: 2023-11-10 | Stop reason: WASHOUT

## 2023-10-09 RX ORDER — HYDROCODONE BITARTRATE AND ACETAMINOPHEN 5; 325 MG/1; MG/1
1 TABLET ORAL 3 TIMES DAILY PRN
Qty: 84 TABLET | Refills: 0 | Status: SHIPPED | OUTPATIENT
Start: 2023-11-14 | End: 2023-11-10 | Stop reason: WASHOUT

## 2023-10-09 RX ORDER — FLUOXETINE HYDROCHLORIDE 20 MG/1
20 CAPSULE ORAL DAILY
Qty: 30 CAPSULE | Refills: 5 | Status: SHIPPED | OUTPATIENT
Start: 2023-10-09 | End: 2023-10-09 | Stop reason: ALTCHOICE

## 2023-10-09 RX ORDER — HYDROCODONE BITARTRATE AND ACETAMINOPHEN 5; 325 MG/1; MG/1
1 TABLET ORAL 3 TIMES DAILY PRN
Qty: 84 TABLET | Refills: 0 | Status: SHIPPED | OUTPATIENT
Start: 2023-12-12 | End: 2023-11-10 | Stop reason: WASHOUT

## 2023-10-09 ASSESSMENT — ENCOUNTER SYMPTOMS
GASTROINTESTINAL NEGATIVE: 1
DIZZINESS: 0
FACIAL ASYMMETRY: 0
PSYCHIATRIC NEGATIVE: 1
ENDOCRINE NEGATIVE: 1
SEIZURES: 0
CARDIOVASCULAR NEGATIVE: 1
LIGHT-HEADEDNESS: 0
HEADACHES: 0
NUMBNESS: 1
TREMORS: 0
BACK PAIN: 1
ALLERGIC/IMMUNOLOGIC NEGATIVE: 1
SPEECH DIFFICULTY: 0
OCCASIONAL FEELINGS OF UNSTEADINESS: 0
HEMATOLOGIC/LYMPHATIC NEGATIVE: 1
WEAKNESS: 1
CONSTITUTIONAL NEGATIVE: 1
RESPIRATORY NEGATIVE: 1
COLOR CHANGE: 0
ARTHRALGIAS: 1
MYALGIAS: 1
LOSS OF SENSATION IN FEET: 1
DEPRESSION: 0

## 2023-10-09 ASSESSMENT — PAIN DESCRIPTION - DESCRIPTORS: DESCRIPTORS: SHOOTING

## 2023-10-09 ASSESSMENT — PAIN SCALES - GENERAL
PAINLEVEL_OUTOF10: 6
PAINLEVEL: 6

## 2023-10-09 ASSESSMENT — PAIN - FUNCTIONAL ASSESSMENT: PAIN_FUNCTIONAL_ASSESSMENT: 0-10

## 2023-10-17 ENCOUNTER — DOCUMENTATION (OUTPATIENT)
Dept: PRIMARY CARE | Facility: CLINIC | Age: 73
End: 2023-10-17
Payer: COMMERCIAL

## 2023-10-17 RX ORDER — FLUTICASONE PROPIONATE AND SALMETEROL 250; 50 UG/1; UG/1
1 POWDER RESPIRATORY (INHALATION)
Qty: 180 EACH | Refills: 3 | Status: SHIPPED | OUTPATIENT
Start: 2023-10-17

## 2023-10-17 NOTE — PROGRESS NOTES
Patient will be having bilateral L3 transforaminal epidural steroid injection on November 2, 2023  Patient currently on warfarin and aspirin  Indication patient with older generation mechanical aortic valve and coronary disease.  INR goal 2.5-3.5.  Patient is to stop warfarin after his dose on October 27.  Restart it in the p.m. of November 2.  He should have an INR performed on November 1.  Start enoxaparin ( Lovenox ) 120mg  injections every 12 hours October 29.  Stop enoxaparin ( Lovenox)  24 hours prior to the procedure time.  Restarted 24 hours after procedure or per recommendations of anesthesiology performing block.  Aspirin will be stopped 7 days prior to procedure.  He is to continue enoxaparin or Lovenox injections until INR therapeutic in the past this is taken the substantial length of time.   Patient wife understand some risk of stroke/MI with holding medicines for the procedure.  Bridge helps minimize this risk.

## 2023-10-18 ENCOUNTER — TELEPHONE (OUTPATIENT)
Dept: PRIMARY CARE | Facility: CLINIC | Age: 73
End: 2023-10-18
Payer: COMMERCIAL

## 2023-10-18 DIAGNOSIS — N64.4 MASTALGIA: Primary | ICD-10-CM

## 2023-10-18 DIAGNOSIS — Z95.2 MECHANICAL HEART VALVE PRESENT: ICD-10-CM

## 2023-10-18 RX ORDER — ENOXAPARIN SODIUM 150 MG/ML
120 INJECTION SUBCUTANEOUS EVERY 12 HOURS
Qty: 20 EACH | Refills: 2 | Status: SHIPPED | OUTPATIENT
Start: 2023-10-18 | End: 2024-04-23 | Stop reason: HOSPADM

## 2023-10-25 ENCOUNTER — CLINICAL SUPPORT (OUTPATIENT)
Dept: PRIMARY CARE | Facility: CLINIC | Age: 73
End: 2023-10-25
Payer: MEDICARE

## 2023-10-25 DIAGNOSIS — Z95.2 MECHANICAL HEART VALVE PRESENT: ICD-10-CM

## 2023-10-25 LAB
POC INR: 3.1
POC PROTHROMBIN TIME: NORMAL

## 2023-11-01 ENCOUNTER — CLINICAL SUPPORT (OUTPATIENT)
Dept: PRIMARY CARE | Facility: CLINIC | Age: 73
End: 2023-11-01
Payer: MEDICARE

## 2023-11-01 DIAGNOSIS — I25.10 CORONARY ARTERY DISEASE INVOLVING NATIVE CORONARY ARTERY OF NATIVE HEART WITHOUT ANGINA PECTORIS: ICD-10-CM

## 2023-11-01 LAB
POC INR: 1.2
POC PROTHROMBIN TIME: NORMAL

## 2023-11-01 NOTE — PROGRESS NOTES
History and Physical Update     I have reviewed the patient's History and Physical Examination. I have personally seen and evaluated the patient, repeating key portions. There is no significant interval change.   Surgery is still indicated. Yes   Consent reviewed and signed by patient/family: Yes   Operative site verified and marked: Yes     Plan: Proceed with planned procedure.

## 2023-11-01 NOTE — PROGRESS NOTES
Patient ID: Shabbir Laurent is a 73 y.o. male.    BILATERAL L3 TRANSFORAMINAL EPIDURAL STEROID INJECTION    Date/Time: 11/1/2023 7:46 AM    Performed by: Brandon Irizarry MD  Authorized by: Brandon Irizarry MD    Consent:     Consent obtained:  Written    Consent given by:  Patient    Risks, benefits, and alternatives were discussed: yes      Risks discussed:  Bleeding, infection, pain and nerve damage  Universal protocol:     Procedure explained and questions answered to patient or proxy's satisfaction: yes      Relevant documents present and verified: yes      Test results available: yes      Imaging studies available: yes      Required blood products, implants, devices, and special equipment available: yes      Site/side marked: yes      Immediately prior to procedure, a time out was called: yes      Patient identity confirmed:  Arm band  Indications:     Indications:  BACK PAIN,LEG PAIN  Pre-procedure details:     Skin preparation:  Chlorhexidine    Preparation: Patient was prepped and draped in the usual sterile fashion    Sedation:     Sedation type:  None  Anesthesia:     Anesthesia method:  Local infiltration    Local anesthetic:  Sodium bicarbonate (LIDOCAINE 0.5%)  Procedure specific details:      History reviewed patient interviewed and examined.  Risks and benefits of procedures discussed and patient agreed to proceed and consent was signed.  With the patient in the prone position the lower back was prepped and draped in sterile fashion.  Under AP guidance L3-L4  vertebral bodies were identified.  In a lateral oblique view the L3  foramen were identified and used as a trajectory view.  A 22-gauge 5 spinal needle was introduced into L3 foramen.  Position was identified in AP and lateral.  IV contrast showed adequate foraminal spread without any vascular or intrathecal uptake.  Lidocaine 0.5% mixed with dexamethasone 10 mg a total of 1.5 cc was injected in each foramen.  Patient tolerated the procedure  without any problems and was transferred to recovery room in stable condition.

## 2023-11-02 ENCOUNTER — HOSPITAL ENCOUNTER (OUTPATIENT)
Dept: RADIOLOGY | Facility: HOSPITAL | Age: 73
Discharge: HOME | End: 2023-11-02
Payer: MEDICARE

## 2023-11-02 ENCOUNTER — OFFICE VISIT (OUTPATIENT)
Dept: PAIN MEDICINE | Facility: CLINIC | Age: 73
End: 2023-11-02
Payer: MEDICARE

## 2023-11-02 VITALS
SYSTOLIC BLOOD PRESSURE: 152 MMHG | HEART RATE: 58 BPM | DIASTOLIC BLOOD PRESSURE: 88 MMHG | OXYGEN SATURATION: 91 % | RESPIRATION RATE: 16 BRPM

## 2023-11-02 DIAGNOSIS — M51.16 DISPLACEMENT OF LUMBAR DISC WITH RADICULOPATHY: ICD-10-CM

## 2023-11-02 DIAGNOSIS — M54.16 LUMBAR RADICULOPATHY: Primary | ICD-10-CM

## 2023-11-02 DIAGNOSIS — I25.810 CORONARY ARTERY DISEASE INVOLVING AUTOLOGOUS ARTERY CORONARY BYPASS GRAFT WITHOUT ANGINA PECTORIS: ICD-10-CM

## 2023-11-02 DIAGNOSIS — M54.16 LUMBAR RADICULOPATHY: ICD-10-CM

## 2023-11-02 PROCEDURE — 2500000005 HC RX 250 GENERAL PHARMACY W/O HCPCS

## 2023-11-02 PROCEDURE — 1160F RVW MEDS BY RX/DR IN RCRD: CPT | Performed by: ANESTHESIOLOGY

## 2023-11-02 PROCEDURE — 77003 FLUOROGUIDE FOR SPINE INJECT: CPT

## 2023-11-02 PROCEDURE — 4010F ACE/ARB THERAPY RXD/TAKEN: CPT | Performed by: ANESTHESIOLOGY

## 2023-11-02 PROCEDURE — 1159F MED LIST DOCD IN RCRD: CPT | Performed by: ANESTHESIOLOGY

## 2023-11-02 PROCEDURE — 1036F TOBACCO NON-USER: CPT | Performed by: ANESTHESIOLOGY

## 2023-11-02 PROCEDURE — 64483 NJX AA&/STRD TFRM EPI L/S 1: CPT | Performed by: ANESTHESIOLOGY

## 2023-11-02 PROCEDURE — 2550000001 HC RX 255 CONTRASTS: Performed by: ANESTHESIOLOGY

## 2023-11-02 PROCEDURE — 2500000004 HC RX 250 GENERAL PHARMACY W/ HCPCS (ALT 636 FOR OP/ED)

## 2023-11-02 PROCEDURE — 3078F DIAST BP <80 MM HG: CPT | Performed by: ANESTHESIOLOGY

## 2023-11-02 PROCEDURE — 64483 NJX AA&/STRD TFRM EPI L/S 1: CPT | Mod: 50 | Performed by: ANESTHESIOLOGY

## 2023-11-02 PROCEDURE — 3075F SYST BP GE 130 - 139MM HG: CPT | Performed by: ANESTHESIOLOGY

## 2023-11-02 PROCEDURE — 1125F AMNT PAIN NOTED PAIN PRSNT: CPT | Performed by: ANESTHESIOLOGY

## 2023-11-02 RX ADMIN — IOHEXOL 5 ML: 240 INJECTION, SOLUTION INTRATHECAL; INTRAVASCULAR; INTRAVENOUS; ORAL at 12:46

## 2023-11-02 ASSESSMENT — ENCOUNTER SYMPTOMS
OCCASIONAL FEELINGS OF UNSTEADINESS: 0
DEPRESSION: 0
LOSS OF SENSATION IN FEET: 0

## 2023-11-02 ASSESSMENT — PAIN SCALES - GENERAL
PAINLEVEL_OUTOF10: 5 - MODERATE PAIN
PAINLEVEL_OUTOF10: 7

## 2023-11-02 ASSESSMENT — PAIN DESCRIPTION - DESCRIPTORS: DESCRIPTORS: ACHING;BURNING

## 2023-11-02 ASSESSMENT — PAIN - FUNCTIONAL ASSESSMENT: PAIN_FUNCTIONAL_ASSESSMENT: 0-10

## 2023-11-02 NOTE — PATIENT INSTRUCTIONS
Written instructions provided to patient    Post-injection instructions:    Your pain may not be gone immediately after the procedure--it usually takes the steroid 3-5 days to start working.   It may take several weeks for the medicine to reach its' full effect.   Pay attention to how much pain relief (what percentage compared to before the procedure) you get and for how long it lasts. We will ask you for this at the follow up visit.     Activity: Avoid strenuous activity for 24 hours. After that return to your normal activity level.     Bandages: Remove after 24 hours     Showering/Bathing: You may shower after bandage is removed     Follow up: With Dr. Irizarry over the phone in two weeks to discuss how you are doing  Call [facility] to Schedule.   Phone     Call the doctor immediately: if you notice:     Excessive bleeding from procedure site (brisk bright red bleeding from the site or     bleeding that soaks the bandages or does not stop)   Severe headache  Inability to walk, leg or arm weakness or numbness that is worse after the procedure   Uncontrolled pain   New urinary or fecal incontinence   Signs of infection: Fever above 101.5F, redness, swelling, pus or drainage from the site

## 2023-11-02 NOTE — PROGRESS NOTES
Post Procedure Flow Sheet  Band aid dry and intact: yes  Discharge/Transfer/Post Procedure Criteria Met: yes   Alert; responding appropriately: yes   VS +/- 20% of pre procedure status: yes   Pulse Ox equal or greater than 95%: Yes   Denies dizziness/age appropriate activity: yes   If “no” to any of above, contact physician performing procedure to obtain order for discharge.  Home going instructions including medication safety reviewed and given in writing to patient: yes  Pt verbalized understanding of home going education: yes  Discharged to home: yes  Discharged via wheelchair: yes

## 2023-11-06 ENCOUNTER — TELEPHONE (OUTPATIENT)
Dept: PRIMARY CARE | Facility: CLINIC | Age: 73
End: 2023-11-06
Payer: COMMERCIAL

## 2023-11-06 NOTE — TELEPHONE ENCOUNTER
Wife would like to know when Shabbir needs to come back in for an INR, he had his block on Thursday.

## 2023-11-07 PROBLEM — M19.041 PRIMARY OSTEOARTHRITIS, RIGHT HAND: Status: ACTIVE | Noted: 2023-11-07

## 2023-11-07 PROBLEM — I50.32 CHRONIC DIASTOLIC CONGESTIVE HEART FAILURE (MULTI): Status: ACTIVE | Noted: 2023-11-07

## 2023-11-07 PROBLEM — M16.11 ARTHRITIS OF RIGHT HIP: Status: ACTIVE | Noted: 2023-11-07

## 2023-11-07 PROBLEM — M19.032 PRIMARY OSTEOARTHRITIS, LEFT WRIST: Status: ACTIVE | Noted: 2023-11-07

## 2023-11-07 PROBLEM — M16.12 ARTHRITIS OF LEFT HIP: Status: ACTIVE | Noted: 2023-11-07

## 2023-11-10 ENCOUNTER — OFFICE VISIT (OUTPATIENT)
Dept: CARDIOLOGY | Facility: CLINIC | Age: 73
End: 2023-11-10
Payer: MEDICARE

## 2023-11-10 ENCOUNTER — HOSPITAL ENCOUNTER (OUTPATIENT)
Dept: CARDIOLOGY | Facility: CLINIC | Age: 73
Discharge: HOME | End: 2023-11-10
Payer: MEDICARE

## 2023-11-10 VITALS
BODY MASS INDEX: 38.19 KG/M2 | WEIGHT: 282 LBS | SYSTOLIC BLOOD PRESSURE: 152 MMHG | HEIGHT: 72 IN | DIASTOLIC BLOOD PRESSURE: 88 MMHG

## 2023-11-10 VITALS
HEIGHT: 72 IN | BODY MASS INDEX: 37.9 KG/M2 | HEART RATE: 66 BPM | WEIGHT: 279.8 LBS | SYSTOLIC BLOOD PRESSURE: 122 MMHG | OXYGEN SATURATION: 97 % | DIASTOLIC BLOOD PRESSURE: 68 MMHG

## 2023-11-10 DIAGNOSIS — Z95.1 HISTORY OF CORONARY ARTERY BYPASS GRAFT X 1: ICD-10-CM

## 2023-11-10 DIAGNOSIS — I25.10 CORONARY ARTERY DISEASE INVOLVING NATIVE CORONARY ARTERY OF NATIVE HEART WITHOUT ANGINA PECTORIS: ICD-10-CM

## 2023-11-10 DIAGNOSIS — I10 BENIGN ESSENTIAL HYPERTENSION: Primary | ICD-10-CM

## 2023-11-10 DIAGNOSIS — E78.2 MIXED HYPERLIPIDEMIA: ICD-10-CM

## 2023-11-10 DIAGNOSIS — Z95.818 PRESENCE OF OTHER CARDIAC IMPLANTS AND GRAFTS: ICD-10-CM

## 2023-11-10 DIAGNOSIS — E78.00 ELEVATED LDL CHOLESTEROL LEVEL: ICD-10-CM

## 2023-11-10 DIAGNOSIS — Z95.2 PRESENCE OF PROSTHETIC HEART VALVE: ICD-10-CM

## 2023-11-10 DIAGNOSIS — Z95.2 MECHANICAL HEART VALVE PRESENT: ICD-10-CM

## 2023-11-10 DIAGNOSIS — R00.1 BRADYCARDIA: ICD-10-CM

## 2023-11-10 PROCEDURE — 1160F RVW MEDS BY RX/DR IN RCRD: CPT | Performed by: INTERNAL MEDICINE

## 2023-11-10 PROCEDURE — 3074F SYST BP LT 130 MM HG: CPT | Performed by: INTERNAL MEDICINE

## 2023-11-10 PROCEDURE — 93306 TTE W/DOPPLER COMPLETE: CPT

## 2023-11-10 PROCEDURE — 1125F AMNT PAIN NOTED PAIN PRSNT: CPT | Performed by: INTERNAL MEDICINE

## 2023-11-10 PROCEDURE — 99214 OFFICE O/P EST MOD 30 MIN: CPT | Performed by: INTERNAL MEDICINE

## 2023-11-10 PROCEDURE — 93306 TTE W/DOPPLER COMPLETE: CPT | Performed by: INTERNAL MEDICINE

## 2023-11-10 PROCEDURE — 3078F DIAST BP <80 MM HG: CPT | Performed by: INTERNAL MEDICINE

## 2023-11-10 PROCEDURE — 1159F MED LIST DOCD IN RCRD: CPT | Performed by: INTERNAL MEDICINE

## 2023-11-10 PROCEDURE — 1036F TOBACCO NON-USER: CPT | Performed by: INTERNAL MEDICINE

## 2023-11-10 PROCEDURE — 4010F ACE/ARB THERAPY RXD/TAKEN: CPT | Performed by: INTERNAL MEDICINE

## 2023-11-10 RX ORDER — FUROSEMIDE 20 MG/1
20 TABLET ORAL
COMMUNITY

## 2023-11-10 ASSESSMENT — ENCOUNTER SYMPTOMS
DYSPNEA ON EXERTION: 1
BACK PAIN: 1

## 2023-11-10 NOTE — PATIENT INSTRUCTIONS
Your echocardiogram today shows normal heart function.  The mechanical aortic valve is functioning normally.

## 2023-11-10 NOTE — PROGRESS NOTES
Subjective   Shabbir Laurent is a 73 y.o. male.    Chief Complaint:  Follow-up coronary disease, valvular heart disease.    HPI    He has had some noncardiac issues including back problems.  He did receive a lumbar spine injection which helped him considerably.  He was on Lovenox for bridging as part of the procedure.  Has had no other cardiac issues.  Notes dyspnea on exertion and fatigue.  No problems with his medications.    His cardiac history is significant for the presence of coronary artery disease and valvular heart disease. He is status post aortic valve replacement and single-vessel coronary artery bypass grafting with a vein graft to the right coronary artery. This was performed on December 19, 2003. The valve is a mechanical aortic valve.     His most recent cardiac catheterization was performed on June 27, 2012. This demonstrated 95% left anterior descending treated with balloon angioplasty and stent placement, 80% circumflex treated with balloon angioplasty and stent placement and a distal 95% right coronary artery supplying a small posterolateral branch. The vein graft to the distal right coronary artery was occluded.     He has a history of ascending aortic aneurysm repair.     Other medical problems include hypertension, hyperlipidemia and severe degenerative arthritis.     Allergies  Medication    · hydroCHLOROthiazide TABS   Recorded By: Alicia Vasquez; 6/10/2013 12:31:57 PM  Denied    · Ceramide III REYMUNDO   Updated By: Alma Alfaro; 4/20/2021 9:56:22 AM   · colchicine   Adverse Reaction; 14 Oct 2017; Updated By: Alma Alfaro; 4/20/2021 9:56:22 AM  abd pain cramping and diarrhea 1 tablet day dose after 1 month DC     Family History  Mother    · Family history of Arthritis (V17.7)   · Family history of Stroke Syndrome (V17.1)  Father    · Family history of Arthritis (V17.7)   · FH: CABG (coronary artery bypass surgery) (V17.3) (Z82.49)  Son    · Family history of sleep apnea (V19.8) (Z82.0)      Social History  Problems    · Alcohol Use (History)   · Does not use illicit drugs (V49.89) (Z78.9)   ·    · Never a smoker      Review of Systems   Constitutional: Positive for malaise/fatigue.   Cardiovascular:  Positive for dyspnea on exertion.   Musculoskeletal:  Positive for arthritis and back pain.       Visit Vitals  /68 (BP Location: Right arm, Patient Position: Sitting, BP Cuff Size: Large adult)   Pulse 66   Ht 1.829 m (6')   Wt 127 kg (279 lb 12.8 oz)   SpO2 97%   BMI 37.95 kg/m²   Smoking Status Former   BSA 2.54 m²        Objective     Constitutional:       Appearance: Not in distress.   Neck:      Vascular: JVD normal.   Pulmonary:      Breath sounds: Normal breath sounds.   Cardiovascular:      Normal rate. Regular rhythm.      Murmurs: There is a grade 2/6 systolic murmur.      No gallop.       Comments: Mechanical heart sounds in the aortic position.  Pulses:     Intact distal pulses.   Edema:     Peripheral edema absent.   Abdominal:      General: Bowel sounds are normal.   Neurological:      Mental Status: Alert and oriented to person, place and time.         Lab Review:   Lab Results   Component Value Date     (L) 05/03/2023    K 4.3 05/03/2023     05/03/2023    CO2 22 05/03/2023    BUN 24 (H) 05/03/2023    CREATININE 1.10 05/03/2023    GLUCOSE 125 (H) 05/03/2023    CALCIUM 9.1 05/03/2023     Lab Results   Component Value Date    CHOL 169 10/04/2023    TRIG 454 (H) 10/04/2023    HDL 42.7 10/04/2023       Assessment:    1.  Coronary disease.  Basically has single-vessel coronary artery disease.  No anginal symptoms.  Continue medical management for coronary disease.    2.  Hyperlipidemia.  Most recent cholesterol is 169, HDL 42, LDL cannot be calculated because of a high triglyceride level.  We have increase his rosuvastatin and may be reasonable to increase it again.    3.  Mechanical aortic valve.  Small perivalvular leak on today's echocardiographic study.    4.   Obstructive sleep apnea.  Uses a CPAP mask.    5.  Chronic diastolic congestive heart failure.  Appears euvolemic by exam.

## 2023-11-13 ENCOUNTER — APPOINTMENT (OUTPATIENT)
Dept: PRIMARY CARE | Facility: CLINIC | Age: 73
End: 2023-11-13
Payer: MEDICARE

## 2023-11-13 ENCOUNTER — LAB (OUTPATIENT)
Dept: LAB | Facility: LAB | Age: 73
End: 2023-11-13
Payer: MEDICARE

## 2023-11-13 ENCOUNTER — OFFICE VISIT (OUTPATIENT)
Dept: RHEUMATOLOGY | Facility: CLINIC | Age: 73
End: 2023-11-13
Payer: MEDICARE

## 2023-11-13 ENCOUNTER — ANTICOAGULATION - WARFARIN VISIT (OUTPATIENT)
Dept: PRIMARY CARE | Facility: CLINIC | Age: 73
End: 2023-11-13
Payer: COMMERCIAL

## 2023-11-13 VITALS
HEIGHT: 72 IN | WEIGHT: 282.6 LBS | HEART RATE: 59 BPM | DIASTOLIC BLOOD PRESSURE: 77 MMHG | BODY MASS INDEX: 38.28 KG/M2 | SYSTOLIC BLOOD PRESSURE: 132 MMHG

## 2023-11-13 DIAGNOSIS — M06.00 SERONEGATIVE RHEUMATOID ARTHRITIS (MULTI): Primary | ICD-10-CM

## 2023-11-13 DIAGNOSIS — M06.00 SERONEGATIVE RHEUMATOID ARTHRITIS (MULTI): ICD-10-CM

## 2023-11-13 DIAGNOSIS — I25.10 CORONARY ARTERY DISEASE INVOLVING NATIVE CORONARY ARTERY OF NATIVE HEART WITHOUT ANGINA PECTORIS: ICD-10-CM

## 2023-11-13 DIAGNOSIS — Z79.899 ENCOUNTER FOR LONG-TERM (CURRENT) USE OF MEDICATIONS: ICD-10-CM

## 2023-11-13 LAB
ALBUMIN SERPL BCP-MCNC: 4.3 G/DL (ref 3.4–5)
ALP SERPL-CCNC: 123 U/L (ref 33–136)
ALT SERPL W P-5'-P-CCNC: 43 U/L (ref 10–52)
ANION GAP SERPL CALC-SCNC: 14 MMOL/L (ref 10–20)
AST SERPL W P-5'-P-CCNC: 28 U/L (ref 9–39)
BILIRUB SERPL-MCNC: 0.5 MG/DL (ref 0–1.2)
BUN SERPL-MCNC: 19 MG/DL (ref 6–23)
CALCIUM SERPL-MCNC: 9.6 MG/DL (ref 8.6–10.3)
CHLORIDE SERPL-SCNC: 102 MMOL/L (ref 98–107)
CO2 SERPL-SCNC: 26 MMOL/L (ref 21–32)
CREAT SERPL-MCNC: 1.09 MG/DL (ref 0.5–1.3)
CRP SERPL-MCNC: 1.02 MG/DL
ERYTHROCYTE [DISTWIDTH] IN BLOOD BY AUTOMATED COUNT: 13.7 % (ref 11.5–14.5)
ERYTHROCYTE [SEDIMENTATION RATE] IN BLOOD BY WESTERGREN METHOD: 34 MM/H (ref 0–20)
GFR SERPL CREATININE-BSD FRML MDRD: 72 ML/MIN/1.73M*2
GLUCOSE SERPL-MCNC: 129 MG/DL (ref 74–99)
HBV CORE AB SER QL: NONREACTIVE
HBV SURFACE AG SERPL QL IA: NONREACTIVE
HCT VFR BLD AUTO: 46.5 % (ref 41–52)
HCV AB SER QL: NONREACTIVE
HGB BLD-MCNC: 15.1 G/DL (ref 13.5–17.5)
MCH RBC QN AUTO: 30.6 PG (ref 26–34)
MCHC RBC AUTO-ENTMCNC: 32.5 G/DL (ref 32–36)
MCV RBC AUTO: 94 FL (ref 80–100)
NRBC BLD-RTO: 0 /100 WBCS (ref 0–0)
PLATELET # BLD AUTO: 262 X10*3/UL (ref 150–450)
POC INR: 1.9
POC PROTHROMBIN TIME: NORMAL
POTASSIUM SERPL-SCNC: 4 MMOL/L (ref 3.5–5.3)
PROT SERPL-MCNC: 6.7 G/DL (ref 6.4–8.2)
RBC # BLD AUTO: 4.94 X10*6/UL (ref 4.5–5.9)
SODIUM SERPL-SCNC: 138 MMOL/L (ref 136–145)
WBC # BLD AUTO: 4.9 X10*3/UL (ref 4.4–11.3)

## 2023-11-13 PROCEDURE — 86140 C-REACTIVE PROTEIN: CPT

## 2023-11-13 PROCEDURE — 85027 COMPLETE CBC AUTOMATED: CPT

## 2023-11-13 PROCEDURE — 87340 HEPATITIS B SURFACE AG IA: CPT

## 2023-11-13 PROCEDURE — 1036F TOBACCO NON-USER: CPT | Performed by: INTERNAL MEDICINE

## 2023-11-13 PROCEDURE — 36415 COLL VENOUS BLD VENIPUNCTURE: CPT

## 2023-11-13 PROCEDURE — 3075F SYST BP GE 130 - 139MM HG: CPT | Performed by: INTERNAL MEDICINE

## 2023-11-13 PROCEDURE — 99213 OFFICE O/P EST LOW 20 MIN: CPT | Performed by: INTERNAL MEDICINE

## 2023-11-13 PROCEDURE — 85652 RBC SED RATE AUTOMATED: CPT

## 2023-11-13 PROCEDURE — 86704 HEP B CORE ANTIBODY TOTAL: CPT

## 2023-11-13 PROCEDURE — 80053 COMPREHEN METABOLIC PANEL: CPT

## 2023-11-13 PROCEDURE — 1160F RVW MEDS BY RX/DR IN RCRD: CPT | Performed by: INTERNAL MEDICINE

## 2023-11-13 PROCEDURE — 86803 HEPATITIS C AB TEST: CPT

## 2023-11-13 PROCEDURE — 1125F AMNT PAIN NOTED PAIN PRSNT: CPT | Performed by: INTERNAL MEDICINE

## 2023-11-13 PROCEDURE — 3078F DIAST BP <80 MM HG: CPT | Performed by: INTERNAL MEDICINE

## 2023-11-13 PROCEDURE — 1159F MED LIST DOCD IN RCRD: CPT | Performed by: INTERNAL MEDICINE

## 2023-11-13 PROCEDURE — 4010F ACE/ARB THERAPY RXD/TAKEN: CPT | Performed by: INTERNAL MEDICINE

## 2023-11-13 PROCEDURE — 86481 TB AG RESPONSE T-CELL SUSP: CPT

## 2023-11-13 RX ORDER — PERPHENAZINE/AMITRIPTYLINE HCL 4MG-10MG
1 TABLET ORAL DAILY
COMMUNITY

## 2023-11-13 ASSESSMENT — ENCOUNTER SYMPTOMS
FATIGUE: 1
LOSS OF SENSATION IN FEET: 1
ABDOMINAL PAIN: 0
DEPRESSION: 0
SLEEP DISTURBANCE: 1
APNEA: 1
OCCASIONAL FEELINGS OF UNSTEADINESS: 1

## 2023-11-14 ENCOUNTER — APPOINTMENT (OUTPATIENT)
Dept: PAIN MEDICINE | Facility: CLINIC | Age: 73
End: 2023-11-14
Payer: COMMERCIAL

## 2023-11-14 DIAGNOSIS — M51.26 HERNIATED NUCLEUS PULPOSUS, L2-3: ICD-10-CM

## 2023-11-14 DIAGNOSIS — M48.062 NEUROGENIC CLAUDICATION DUE TO LUMBAR SPINAL STENOSIS: ICD-10-CM

## 2023-11-14 DIAGNOSIS — M47.26 OSTEOARTHRITIS OF SPINE WITH RADICULOPATHY, LUMBAR REGION: ICD-10-CM

## 2023-11-14 RX ORDER — HYDROCODONE BITARTRATE AND ACETAMINOPHEN 5; 325 MG/1; MG/1
1 TABLET ORAL 3 TIMES DAILY PRN
Qty: 84 TABLET | Refills: 0 | Status: SHIPPED | OUTPATIENT
Start: 2023-11-14 | End: 2023-12-13 | Stop reason: SDUPTHER

## 2023-11-15 LAB
AORTIC VALVE MEAN GRADIENT: 15.7
AORTIC VALVE PEAK VELOCITY: 2.71
AV PEAK GRADIENT: 29.3
AVA (PEAK VEL): 1.08
AVA (VTI): 1.29
EJECTION FRACTION APICAL 4 CHAMBER: 52.8
EJECTION FRACTION: 65
LEFT ATRIUM VOLUME AREA LENGTH INDEX BSA: 42.2
LEFT VENTRICLE INTERNAL DIMENSION DIASTOLE: 4.65 (ref 3.5–6)
LEFT VENTRICULAR OUTFLOW TRACT DIAMETER: 2.28
MITRAL VALVE E/A RATIO: 0.79
NIL(NEG) CONTROL SPOT COUNT: NORMAL
PANEL A SPOT COUNT: 0
PANEL B SPOT COUNT: 0
POS CONTROL SPOT COUNT: NORMAL
RIGHT VENTRICLE FREE WALL PEAK S': 0.7
T-SPOT. TB INTERPRETATION: NEGATIVE
TRICUSPID ANNULAR PLANE SYSTOLIC EXCURSION: 1.8

## 2023-11-20 ENCOUNTER — TELEMEDICINE (OUTPATIENT)
Dept: PAIN MEDICINE | Facility: CLINIC | Age: 73
End: 2023-11-20
Payer: MEDICARE

## 2023-11-20 ENCOUNTER — CLINICAL SUPPORT (OUTPATIENT)
Dept: PRIMARY CARE | Facility: CLINIC | Age: 73
End: 2023-11-20
Payer: MEDICARE

## 2023-11-20 DIAGNOSIS — Z95.2 MECHANICAL HEART VALVE PRESENT: ICD-10-CM

## 2023-11-20 DIAGNOSIS — M48.062 LUMBAR STENOSIS WITH NEUROGENIC CLAUDICATION: Primary | ICD-10-CM

## 2023-11-20 LAB
POC INR: 2.4
POC PROTHROMBIN TIME: NORMAL

## 2023-11-20 PROCEDURE — 99442 PR PHYS/QHP TELEPHONE EVALUATION 11-20 MIN: CPT | Performed by: ANESTHESIOLOGY

## 2023-11-20 NOTE — PROGRESS NOTES
History Of Present Illness  Shabbir Laurent is a 73 y.o. male presenting with low back and bilateral lower extremity pain secondary to spinal stenosis with neurogenic claudication.  Patient underwent bilateral L3 transforaminal epidural steroid injection on November 2, 2023.  Patient reports more than 50% relief of his low back pain and significant improvement in standing and walking at times.  Patient is not doing any physical therapy at the present time.  Current pain medications include Lyrica 200 mg once at bedtime and hydrocodone 5/325 mg 3 times daily.  Patient reports more than 50% relief on the current regimen.       Past Medical History  He has a past medical history of Abrasion, right lower leg, initial encounter (09/26/2019), Body mass index (BMI) 37.0-37.9, adult (02/28/2020), Corns and callosities (03/22/2019), Diverticulosis of intestine, part unspecified, without perforation or abscess without bleeding, Other conditions influencing health status (08/30/2016), Other forms of angina pectoris, Other hyperlipidemia, Other postherpetic nervous system involvement (10/07/2015), Pain in right ankle and joints of right foot (01/15/2020), Personal history of colonic polyps, Personal history of diseases of the skin and subcutaneous tissue (09/29/2016), Personal history of other (healed) physical injury and trauma (08/11/2014), Personal history of other diseases of the circulatory system, Personal history of other diseases of the musculoskeletal system and connective tissue, Personal history of other diseases of the musculoskeletal system and connective tissue (07/06/2013), Personal history of other diseases of the nervous system and sense organs (10/28/2013), Personal history of other diseases of the respiratory system (03/04/2019), Personal history of other endocrine, nutritional and metabolic disease, Personal history of other endocrine, nutritional and metabolic disease (05/02/2019), Personal history of other  endocrine, nutritional and metabolic disease (05/10/2018), Personal history of other mental and behavioral disorders (04/07/2020), Personal history of other specified conditions, Personal history of other specified conditions, Prediabetes (10/04/2018), Presence of aortocoronary bypass graft, Presence of prosthetic heart valve, Rash and other nonspecific skin eruption (01/10/2017), Spondylosis without myelopathy or radiculopathy, lumbar region (07/06/2013), Strain of unspecified muscles, fascia and tendons at thigh level, unspecified thigh, initial encounter (12/05/2013), Unspecified asthma, uncomplicated (03/18/2019), and Unspecified fall, initial encounter (09/26/2019).    Surgical History  He has a past surgical history that includes Knee arthroscopy w/ debridement (10/22/2013); Gallbladder surgery (10/22/2013); Colonoscopy (05/16/2013); Coronary artery bypass graft (04/23/2018); Other surgical history (04/23/2018); Colonoscopy (09/21/2017); Cardiac catheterization (04/23/2018); Colonoscopy (07/25/2014); Other surgical history (10/02/2014); Aortic valve replacement (06/25/2014); Other surgical history (08/03/2022); Other surgical history (08/03/2022); and Other surgical history (10/28/2013).     Social History  He reports that he has quit smoking. His smoking use included cigarettes. He has never used smokeless tobacco. He reports that he does not drink alcohol and does not use drugs.    Family History  Family History   Problem Relation Name Age of Onset    Arthritis Mother      Other (Stroke Syndrome) Mother      Arthritis Father      Other (CABG) Father      Sleep apnea Son          Allergies  Hydrochlorothiazide, Phenylpropanolamine, Amiloride-hydrochlorothiazide, Ceramide 3b, Chlorpheniramine-phenylpropan, and Colchicine    Review of systems:   13-point review of systems done and negative except as noted in HPI      Physical Exam   Vital signs: Reviewed  Constitutional: No acute distress, well appearing and  well nourished. Patient appears stated age.   Eyes: Conjunctiva non-icteric and eye lids are without obvious rash or drooping. Pupils are symmetric.   Ears, Nose, Mouth, and Throat: External ears and nose appear to be without deformity or rash. No lesions or masses noted. Hearing is grossly intact.   Neck:. No JVD noted, tracheal position is midline.   Head and Face: Examination of the head and face revealed no abnormalities.   Respiratory: No gasping or shortness of breath noted, no use of accessory muscles noted.   Cardiovascular: Examination for edema is normal.   GI: Abdomen nontender to palpation.   Skin: No rashes or open lesions/ulcers identified on skin.   Musk: Digits/nails show no clubbing or cyanosis. No asymmetry or masses noted of the musculature. Examination of the muscles/joints/bones show normal range of motion. Gait is grossly [].  [] to walk on toes and heels.   Neurologic: Cranial nerves II-XII intact, motor strength 5/5 muscle strength of the lower extremities bilaterally and equal. 5/5 muscle strength of the upper extremities bilaterally and equal.   Reflexes: normal.   Sensation: []  Provocative tests:       Last Recorded Vitals  There were no vitals taken for this visit.    Relevant Results            Last OARRS Review: Brandon Irizarry MD on 11/20/2023  2:00 PM    I have personally reviewed the OARRS report for Shabbir Laurent I have considered the risks of abuse, dependence, addiction and diversion       Assessment/Plan   Lumbar stenosis with neurogenic claudication  Patient was reassured and recommended if pain would return we will consider repeating the injection.  Also recommended referring for water-based physical therapy however patient would like to think about it.  Plan  Repeating bilateral L3 transforaminal epidural steroid injection if pain returns  Consider referring patient for water-based physical therapy for core muscle strengthening  Continue current pain medications        Brandon Irizarry MD

## 2023-11-26 DIAGNOSIS — F32.0 CURRENT MILD EPISODE OF MAJOR DEPRESSIVE DISORDER WITHOUT PRIOR EPISODE (CMS-HCC): ICD-10-CM

## 2023-11-26 DIAGNOSIS — E11.9 CONTROLLED TYPE 2 DIABETES MELLITUS WITHOUT COMPLICATION, WITHOUT LONG-TERM CURRENT USE OF INSULIN (MULTI): ICD-10-CM

## 2023-11-26 DIAGNOSIS — I73.9 PERIPHERAL VASCULAR DISEASE (CMS-HCC): Primary | ICD-10-CM

## 2023-11-26 RX ORDER — BLOOD SUGAR DIAGNOSTIC
STRIP MISCELLANEOUS
Qty: 100 STRIP | Refills: 3 | Status: SHIPPED | OUTPATIENT
Start: 2023-11-26

## 2023-11-26 RX ORDER — FLUOXETINE HYDROCHLORIDE 20 MG/1
20 CAPSULE ORAL DAILY
Qty: 90 CAPSULE | Refills: 3 | Status: SHIPPED | OUTPATIENT
Start: 2023-11-26 | End: 2024-11-25

## 2023-11-26 RX ORDER — LANCETS 33 GAUGE
1 EACH MISCELLANEOUS DAILY
Qty: 100 EACH | Refills: 3 | Status: SHIPPED | OUTPATIENT
Start: 2023-11-26 | End: 2024-11-25

## 2023-11-29 ENCOUNTER — APPOINTMENT (OUTPATIENT)
Dept: PRIMARY CARE | Facility: CLINIC | Age: 73
End: 2023-11-29
Payer: MEDICARE

## 2023-11-29 ENCOUNTER — ANTICOAGULATION - WARFARIN VISIT (OUTPATIENT)
Dept: PRIMARY CARE | Facility: CLINIC | Age: 73
End: 2023-11-29
Payer: COMMERCIAL

## 2023-11-29 DIAGNOSIS — Z95.2 MECHANICAL HEART VALVE PRESENT: Primary | ICD-10-CM

## 2023-11-29 LAB
POC INR: 2
POC PROTHROMBIN TIME: NORMAL

## 2023-12-13 ENCOUNTER — CLINICAL SUPPORT (OUTPATIENT)
Dept: PRIMARY CARE | Facility: CLINIC | Age: 73
End: 2023-12-13
Payer: MEDICARE

## 2023-12-13 DIAGNOSIS — M47.26 OSTEOARTHRITIS OF SPINE WITH RADICULOPATHY, LUMBAR REGION: ICD-10-CM

## 2023-12-13 DIAGNOSIS — G60.9 HEREDITARY AND IDIOPATHIC NEUROPATHY: ICD-10-CM

## 2023-12-13 DIAGNOSIS — M48.062 NEUROGENIC CLAUDICATION DUE TO LUMBAR SPINAL STENOSIS: ICD-10-CM

## 2023-12-13 DIAGNOSIS — M51.26 HERNIATED NUCLEUS PULPOSUS, L2-3: ICD-10-CM

## 2023-12-13 LAB
POC INR: 2.3
POC PROTHROMBIN TIME: NORMAL

## 2023-12-13 RX ORDER — HYDROCODONE BITARTRATE AND ACETAMINOPHEN 5; 325 MG/1; MG/1
1 TABLET ORAL 3 TIMES DAILY PRN
Qty: 84 TABLET | Refills: 0 | Status: SHIPPED | OUTPATIENT
Start: 2023-12-13 | End: 2024-01-08 | Stop reason: SDUPTHER

## 2023-12-27 ENCOUNTER — CLINICAL SUPPORT (OUTPATIENT)
Dept: PRIMARY CARE | Facility: CLINIC | Age: 73
End: 2023-12-27
Payer: MEDICARE

## 2023-12-27 DIAGNOSIS — Z95.2 MECHANICAL HEART VALVE PRESENT: Primary | ICD-10-CM

## 2023-12-27 LAB
POC INR: 2.7 (ref 2.5–3.5)
POC PROTHROMBIN TIME: NORMAL

## 2024-01-08 ENCOUNTER — OFFICE VISIT (OUTPATIENT)
Dept: PAIN MEDICINE | Facility: CLINIC | Age: 74
End: 2024-01-08
Payer: MEDICARE

## 2024-01-08 VITALS — RESPIRATION RATE: 20 BRPM | DIASTOLIC BLOOD PRESSURE: 85 MMHG | HEART RATE: 81 BPM | SYSTOLIC BLOOD PRESSURE: 138 MMHG

## 2024-01-08 DIAGNOSIS — M47.26 OSTEOARTHRITIS OF SPINE WITH RADICULOPATHY, LUMBAR REGION: ICD-10-CM

## 2024-01-08 DIAGNOSIS — M51.26 HERNIATED NUCLEUS PULPOSUS, L2-3: ICD-10-CM

## 2024-01-08 DIAGNOSIS — M48.062 NEUROGENIC CLAUDICATION DUE TO LUMBAR SPINAL STENOSIS: ICD-10-CM

## 2024-01-08 PROCEDURE — 99213 OFFICE O/P EST LOW 20 MIN: CPT | Performed by: ANESTHESIOLOGY

## 2024-01-08 PROCEDURE — 3075F SYST BP GE 130 - 139MM HG: CPT | Performed by: ANESTHESIOLOGY

## 2024-01-08 PROCEDURE — 1125F AMNT PAIN NOTED PAIN PRSNT: CPT | Performed by: ANESTHESIOLOGY

## 2024-01-08 PROCEDURE — 1036F TOBACCO NON-USER: CPT | Performed by: ANESTHESIOLOGY

## 2024-01-08 PROCEDURE — 3079F DIAST BP 80-89 MM HG: CPT | Performed by: ANESTHESIOLOGY

## 2024-01-08 PROCEDURE — 1159F MED LIST DOCD IN RCRD: CPT | Performed by: ANESTHESIOLOGY

## 2024-01-08 PROCEDURE — 4010F ACE/ARB THERAPY RXD/TAKEN: CPT | Performed by: ANESTHESIOLOGY

## 2024-01-08 RX ORDER — HYDROCODONE BITARTRATE AND ACETAMINOPHEN 5; 325 MG/1; MG/1
1 TABLET ORAL 3 TIMES DAILY PRN
Qty: 84 TABLET | Refills: 0 | Status: SHIPPED | OUTPATIENT
Start: 2024-01-10 | End: 2024-04-02 | Stop reason: SDUPTHER

## 2024-01-08 RX ORDER — HYDROCODONE BITARTRATE AND ACETAMINOPHEN 5; 325 MG/1; MG/1
1 TABLET ORAL 3 TIMES DAILY PRN
Qty: 84 TABLET | Refills: 0 | Status: SHIPPED | OUTPATIENT
Start: 2024-03-06 | End: 2024-04-02 | Stop reason: SDUPTHER

## 2024-01-08 RX ORDER — HYDROCODONE BITARTRATE AND ACETAMINOPHEN 5; 325 MG/1; MG/1
1 TABLET ORAL 3 TIMES DAILY PRN
Qty: 84 TABLET | Refills: 0 | Status: SHIPPED | OUTPATIENT
Start: 2024-02-07 | End: 2024-04-02 | Stop reason: SDUPTHER

## 2024-01-08 ASSESSMENT — PAIN - FUNCTIONAL ASSESSMENT: PAIN_FUNCTIONAL_ASSESSMENT: 0-10

## 2024-01-08 ASSESSMENT — PAIN DESCRIPTION - DESCRIPTORS: DESCRIPTORS: ACHING

## 2024-01-08 ASSESSMENT — PAIN SCALES - GENERAL: PAINLEVEL_OUTOF10: 2

## 2024-01-08 NOTE — PROGRESS NOTES
History Of Present Illness  Shabbir Laurent is a 73 y.o. male with a past medical history of coronary artery disease, mechanical aortic valve on anticoagulation, AUBREY, diastolic heart failure presents to pain clinic for management of low back pain.  Patient is followed with pain clinic for bilateral lower extremity radicular symptoms secondary to spinal stenosis with neurogenic claudication.  Patient underwent bilateral L3 transforaminal epidural steroid injection on November 2, 2023.  Patient reports greater than 90% of pain relief with this injection that is ongoing.  Current pain medications include Lyrica 200 mg at nighttime and hydrocodone 5 mg 3 times daily.  PDMP reviewed this visit with no violations.  Currently he states his pain is very well-controlled at a 2 out of 10 and is here primarily for medication refill.  Patient states he would want injection therapy more for week frequent however it takes him a very long time to get in his therapeutic INR range whenever he stops his warfarin for the injection. The pain causes significant stress in the patient's life, specifically interferes with general activity, mood, walking ability, ability to perform tasks at home and/or work.  Patient participates in physical therapy and continues to perform physician directed exercises at home. Denies any bowel or bladder incontinence, saddle anesthesia, worsening pain, weakness or falls.     Past Medical History  He has a past medical history of Abrasion, right lower leg, initial encounter (09/26/2019), Body mass index (BMI) 37.0-37.9, adult (02/28/2020), Corns and callosities (03/22/2019), Diverticulosis of intestine, part unspecified, without perforation or abscess without bleeding, Other conditions influencing health status (08/30/2016), Other forms of angina pectoris, Other hyperlipidemia, Other postherpetic nervous system involvement (10/07/2015), Pain in right ankle and joints of right foot (01/15/2020), Personal  history of colonic polyps, Personal history of diseases of the skin and subcutaneous tissue (09/29/2016), Personal history of other (healed) physical injury and trauma (08/11/2014), Personal history of other diseases of the circulatory system, Personal history of other diseases of the musculoskeletal system and connective tissue, Personal history of other diseases of the musculoskeletal system and connective tissue (07/06/2013), Personal history of other diseases of the nervous system and sense organs (10/28/2013), Personal history of other diseases of the respiratory system (03/04/2019), Personal history of other endocrine, nutritional and metabolic disease, Personal history of other endocrine, nutritional and metabolic disease (05/02/2019), Personal history of other endocrine, nutritional and metabolic disease (05/10/2018), Personal history of other mental and behavioral disorders (04/07/2020), Personal history of other specified conditions, Personal history of other specified conditions, Prediabetes (10/04/2018), Presence of aortocoronary bypass graft, Presence of prosthetic heart valve, Rash and other nonspecific skin eruption (01/10/2017), Spondylosis without myelopathy or radiculopathy, lumbar region (07/06/2013), Strain of unspecified muscles, fascia and tendons at thigh level, unspecified thigh, initial encounter (12/05/2013), Unspecified asthma, uncomplicated (03/18/2019), and Unspecified fall, initial encounter (09/26/2019).    Surgical History  He has a past surgical history that includes Knee arthroscopy w/ debridement (10/22/2013); Gallbladder surgery (10/22/2013); Colonoscopy (05/16/2013); Coronary artery bypass graft (04/23/2018); Other surgical history (04/23/2018); Colonoscopy (09/21/2017); Cardiac catheterization (04/23/2018); Colonoscopy (07/25/2014); Other surgical history (10/02/2014); Aortic valve replacement (06/25/2014); Other surgical history (08/03/2022); Other surgical history  (08/03/2022); and Other surgical history (10/28/2013).     Social History  He reports that he has quit smoking. His smoking use included cigarettes. He has never used smokeless tobacco. He reports that he does not drink alcohol and does not use drugs.    Family History  Family History   Problem Relation Name Age of Onset    Arthritis Mother      Other (Stroke Syndrome) Mother      Arthritis Father      Other (CABG) Father      Sleep apnea Son          Allergies  Hydrochlorothiazide, Phenylpropanolamine, Amiloride-hydrochlorothiazide, Ceramide 3b, Chlorpheniramine-phenylpropan, and Colchicine    Review of Symptoms:   Constitutional: Negative for chills, diaphoresis or fever  HENT: Negative for neck swelling  Eyes:.  Negative for eye pain  Respiratory:.  Negative for cough, shortness of breath or wheezing    Cardiovascular:.  Negative for chest pain or palpitations  Gastrointestinal:.  Negative for abdominal pain, nausea and vomiting  Genitourinary:.  Negative for urgency  Musculoskeletal:  Positive for back pain. Positive for joint pain. Denies falls within the past 3 months.  Skin: Negative for wounds or itching   Neurological: Negative for dizziness, seizures, loss of consciousness and weakness  Endo/Heme/Allergies: Does not bruise/bleed easily  Psychiatric/Behavioral: Negative for depression. The patient does not appear anxious.       PHYSICAL EXAM  Vitals signs reviewed  Constitutional:       General: Not in acute distress     Appearance: Normal appearance. Not ill-appearing.  HENT:     Head: Normocephalic and atraumatic  Eyes:     Conjunctiva/sclera: Conjunctivae normal  Cardiovascular:     Rate and Rhythm: Normal rate and regular rhythm  Pulmonary:     Effort: No respiratory distress  Abdominal:     Palpations: Abdomen is soft  Musculoskeletal: DEL CID  Skin:     General: Skin is warm and dry  Neurological:     General: No focal deficit present  Psychiatric:         Mood and Affect: Mood normal         Behavior:  Behavior normal    Advanced Exam   Inspection: No gross deformities, no surgical scars  Palpation: No tenderness of patient of lumbar midline, lumbar paraspinals, bilateral SI joints  ROM: Normal range of motion of the lumbar flexion extension  Motor: 5-5 strength upper and lower extremities  Sensory: Negative for sensory abnormalities in upper and lower extremities  Reflexes: 2+ reflexes bilateral upper and lower extremities  Lumbar: Negative straight leg raising bilaterally, negative for facet loading  Sacral: Negative Lenin, negative Gaenslen's  Hip: Negative for pain with anterior, lateral, posterior palpation of hip joints, negative FADIR, negative for internal/external rotation of the hip, negative logroll     Last Recorded Vitals  /85   Pulse 81   Resp 20     Relevant Results  Current Outpatient Medications   Medication Instructions    aspirin 81 mg EC tablet 1 tablet, oral, Daily    blood glucose control, normal solution Use as directed    cetirizine (ZYRTEC) 10 mg capsule 1 capsule, oral, Daily    cholecalciferol (Vitamin D-3) 50 mcg (2,000 unit) capsule 1 capsule, oral, Daily    enoxaparin (LOVENOX) 120 mg, subcutaneous, Every 12 hours    FLUoxetine (PROZAC) 20 mg, oral, Daily    fluticasone propion-salmeteroL (Advair Diskus) 250-50 mcg/dose diskus inhaler 1 puff, inhalation, 2 times daily RT, During summer (grass cutting)    folic acid (FOLVITE) 1 mg, oral, Daily    furosemide (LASIX) 20 mg, oral, 3 times weekly, Take every Mon, Wed, and FRiday    HYDROcodone-acetaminophen (Norco) 5-325 mg tablet 1 tablet, oral, 3 times daily PRN    Jardiance 25 mg TAKE 1 TABLET BY MOUTH DAILY    krill-om3-dha-epa-om6-lip-astx (Krill Oil, Omega 3 and 6,) 1,500-165-67.5 mg capsule 1 capsule, oral, Daily    lancets 33 gauge misc 1 each, percutaneous, Daily, Test BS daily    leflunomide (ARAVA) 20 mg, oral, Daily    levothyroxine (SYNTHROID, LEVOXYL) 137 mcg, oral, Daily    losartan (COZAAR) 100 mg, oral, Daily     metFORMIN (GLUCOPHAGE) 850 mg, oral, 2 times daily with meals    multivit-min/FA/lycopen/lutein (CENTRUM SILVER MEN ORAL) 1 tablet, oral, Daily    OneTouch Ultra Test strip Test blood glucose once daily    pregabalin (LYRICA) 200 mg, oral, Nightly    psyllium (Metamucil) 0.4 gram capsule 1 capsule, oral, 2 times daily    rosuvastatin (Crestor) 20 mg tablet 1 tablet, oral, Daily    rosuvastatin (CRESTOR) 10 mg, oral, Daily    warfarin (Coumadin) 5 mg tablet TAKE 1 AND 1/2 TABLETS BY MOUTH  5 TIMES WEEKLY AND 1 TABLET BY  MOUTH TWICE WEEKLY DAILY OR AS  DIRECTED AFTER INR TESTING. DO  NOT START BEFORE MARCH 11, 2023         MR lumbar spine wo IV contrast 08/27/2023    Narrative  Interpreted By:  WILLY PEREZ MD, PHD  MRN: [id number]  Patient Name: GIORGIO EDWARDS    STUDY:  MRI L-SPINE WO;  8/27/2023 10:40 am    INDICATION:  Persistent low back pain and radiating hip and pelvis pain and groin  pain on the right.  Can radiate towards the knee.  Sharp and  stabbing.  More persistent and intense over the last 6 months.  Weakness with weightbearing activities.  No fall    COMPARISON:  Lumbar spine radiographs, same day and lumbar spine MRI, 03/12/2021    ACCESSION NUMBER(S):  [id number]    ORDERING CLINICIAN:  AVELINA MELO    TECHNIQUE:  Sagittal T1, T2, STIR, axial T1 and T2 weighted images of the lumbar  spine were acquired.    FINDINGS:  5 lumbar-type vertebral bodies are again noted, the last well-formed  disc space is labeled L5-S1.    Alignment: Mild retrolisthesis at L1-2, L2-3, and L3-4 and grade 1  anterolisthesis at L4-5, similar to previous.    Vertebrae/Intervertebral Discs: The vertebral bodies demonstrate  expected height. Multilevel degenerative endplate changes with  associated marrow edema at L2-3. Nodular T1 hyperintense signal at L1  is unchanged from previous and may represent focal fatty marrow or a  benign hemangioma. Multilevel loss of normal disc T2 signal intensity  and disc height.    Conus: The  lower thoracic cord appears unremarkable. The conus  terminates at L1. The cauda equina is unremarkable.    T12-L1: Disc bulge and facet hypertrophy. No canal or foraminal  narrowing.    L1-2: Disc bulge, facet hypertrophy, and ligamentum flavum thickening  with diffuse mild spinal canal narrowing and mild bilateral neural  foramen narrowing, similar to previous.    L2-3: Disc bulge with a superimposed inferiorly directed right  central disc extrusion, facet hypertrophy, ligamentum flavum  thickening, and prominent epidural fat with severe spinal canal  narrowing and moderate left and severe right neural foramen  narrowing, similar to previous.    L3-4: Disc bulge, facet hypertrophy, ligamentum flavum thickening,  and prominent epidural fat with diffuse moderate spinal canal  narrowing and moderate bilateral neural foramen narrowing, similar to  previous.    L4-5: Disc bulge, facet hypertrophy, and ligamentum flavum thickening  with diffuse moderate spinal canal narrowing and mild right and  moderate left neural foramen narrowing, similar to previous.    L5-S1: Disc bulge, facet hypertrophy, and ligamentum flavum  thickening with mild left neural foramen narrowing, similar to  previous.    Marked paraspinal muscular atrophy, the paraspinal soft tissues are  otherwise unremarkable. Partially visualized distension of the  bladder.    Impression  Stable degenerative changes most pronounced at L2-3.      MR CERVICAL SPINE WO IV CONTRAST 02/18/2022    Narrative  MRN: [id number]  Patient Name: GIORGIO EDWARDS    STUDY:  MRI CERVICAL WO;  2/18/2022 11:43 am    INDICATION:  severe neck pain and cracking noise and feel dizziness when bends  head and neck in different directions  R42: Vertigo M47.812: Cervical  arthritis G95.9: Cervical myelopathy.    COMPARISON:  MRI of the cervical spine from 06/21/2010    ACCESSION NUMBER(S):  [id number]    ORDERING CLINICIAN:  MALINDA STEELE    TECHNIQUE:  Sagittal T1, T2, STIR, axial T1 and  axial T2 weighted images were  acquired through the cervical spine.    FINDINGS:  Alignment: The vertebral alignment is within normal limits.    Vertebrae/Intervertebral Discs: The vertebral bodies demonstrate  expected height.  A well-defined T1 hypointense and T2 hyperintense  lesion within the odontoid is minimally increased in size from the  2010 MRI, now measuring up to 1.4 cm (previously 1.0 cm). The marrow  signal is otherwise within normal limits. Disc desiccation throughout  the cervical spine.    Cord: Normal in caliber and signal.    C1-C2: The cervicomedullary junction appears unremarkable. There is  no central canal stenosis.    C2-C3: Posterior disc osteophyte complex slightly eccentric to the  left and mild bilateral facet arthropathy with mild effacement of the  ventral thecal sac. The bilateral neural foramen are normal. No  measurable spinal canal stenosis.    C3-C4: No posterior disc contour abnormality. There is moderate  bilateral facet joint arthropathy. Bilateral uncovertebral joint  spurs more prominent on the right. Constellation of findings causes  mild narrowing of the spinal canal with disc material and  uncovertebral joint spurring causing moderate left and severe right  neural foraminal narrowing.    C4-C5: Posterior disc osteophyte complex and bilateral facet  arthropathy with mild spinal canal narrowing and moderate bilateral  neural foraminal narrowing, right more than left.    C5-C6: Posterior disc osteophyte complex, asymmetric to the left, and  moderate bilateral facet arthropathy. Causing effacement of the  ventral thecal sac and mild narrowing of the left lateral recess and  left neural foramen. No spinal canal or right neural foraminal  narrowing.    C6-C7: Posterior disc osteophyte complex mildly effaces the ventral  thecal sac without spinal canal stenosis or neural foraminal  narrowing.    C7-T1: There is a focal central disc herniation which mildly effaces  the ventral  thecal sac. No spinal canal stenosis. There is mild  bilateral facet joint arthropathy. No neural foraminal narrowing.    T1-T2: Given sagittal views only there is no spinal canal stenosis or  neural foramen narrowing.    The prevertebral and posterior paraspinous soft tissues are within  normal limits.    Impression  1. Multilevel cervical spondylosis, with severe neural foramen  narrowing on the right at C3-C4 and multilevel moderate neural  foramen narrowing. Detailed description is given above. There is no  severe spinal canal stenosis.  2. Minimal interval increase in size of a well-defined osseous lesion  in the odontoid process from 2010, favoring a benign/nonaggressive  process.    I personally reviewed the images/study and I agree with the findings  as stated. This study was interpreted at [facility], [place], [place].     No image results found.       1. Herniated nucleus pulposus, L2-3        2. Neurogenic claudication due to lumbar spinal stenosis        3. Osteoarthritis of spine with radiculopathy, lumbar region             ASSESSMENT/PLAN  Shabbir Laurent is a 73 y.o. male with a past medical history of coronary artery disease, mechanical aortic valve on anticoagulation, AUBREY, diastolic heart failure presents to pain clinic for management of low back pain.  Patient has had sustained lasting relief with bilateral L3 transforaminal injection (>90%, ongoing from 11/2/23) currently pain is well-controlled.  Current pain medications include Lyrica 200 mg at nighttime and hydrocodone 5 mg 3 times daily.  PDMP reviewed this visit with no violations.  Patient states he would desire injection therapy whenever his pain returns.  Of note patient does take a long time to resume back in therapeutic INR window with Coumadin and would recommend injection sparingly.      Our plan is as follows:  - Can plan for repeat bilateral L3 transforaminal injection when pain returns.  Patient  is on Coumadin.   - Start participate in physical therapy as well as physician directed home exercises  - Continue pain medications as prescribed  - The patient was counseled on the appropriate use of the opioid medication, its potential dangers and the responsibilities that go along with being prescribed opioid medications including safe storage, use and disposal of the medication. Patient was also counseled on the adverse effects of long-term opioid use including hyperalgesia, tolerance, decreased immune function, and changes to the body's natural hormones. The patient also understands the potential risks for respiratory depression especially if the medication is combined with other central nervous system depressants such as benzodiazepines or alcohol.      Nilton Rosales MD

## 2024-01-10 ENCOUNTER — OFFICE VISIT (OUTPATIENT)
Dept: SLEEP MEDICINE | Facility: CLINIC | Age: 74
End: 2024-01-10
Payer: MEDICARE

## 2024-01-10 VITALS
HEART RATE: 69 BPM | WEIGHT: 276 LBS | SYSTOLIC BLOOD PRESSURE: 127 MMHG | HEIGHT: 72 IN | DIASTOLIC BLOOD PRESSURE: 76 MMHG | OXYGEN SATURATION: 95 % | BODY MASS INDEX: 37.38 KG/M2 | RESPIRATION RATE: 18 BRPM

## 2024-01-10 DIAGNOSIS — I10 BENIGN ESSENTIAL HYPERTENSION: ICD-10-CM

## 2024-01-10 DIAGNOSIS — J30.9 ALLERGIC RHINITIS, UNSPECIFIED SEASONALITY, UNSPECIFIED TRIGGER: ICD-10-CM

## 2024-01-10 DIAGNOSIS — G25.81 RLS (RESTLESS LEGS SYNDROME): ICD-10-CM

## 2024-01-10 DIAGNOSIS — E66.9 OBESITY (BMI 30-39.9): ICD-10-CM

## 2024-01-10 DIAGNOSIS — E83.10 DISORDER OF IRON METABOLISM: ICD-10-CM

## 2024-01-10 DIAGNOSIS — F19.982: ICD-10-CM

## 2024-01-10 DIAGNOSIS — G47.33 OBSTRUCTIVE SLEEP APNEA ON CPAP: Primary | ICD-10-CM

## 2024-01-10 DIAGNOSIS — F51.04 CHRONIC INSOMNIA: ICD-10-CM

## 2024-01-10 PROCEDURE — 1159F MED LIST DOCD IN RCRD: CPT | Performed by: INTERNAL MEDICINE

## 2024-01-10 PROCEDURE — 99214 OFFICE O/P EST MOD 30 MIN: CPT | Performed by: INTERNAL MEDICINE

## 2024-01-10 PROCEDURE — 3078F DIAST BP <80 MM HG: CPT | Performed by: INTERNAL MEDICINE

## 2024-01-10 PROCEDURE — 1036F TOBACCO NON-USER: CPT | Performed by: INTERNAL MEDICINE

## 2024-01-10 PROCEDURE — 4010F ACE/ARB THERAPY RXD/TAKEN: CPT | Performed by: INTERNAL MEDICINE

## 2024-01-10 PROCEDURE — 1160F RVW MEDS BY RX/DR IN RCRD: CPT | Performed by: INTERNAL MEDICINE

## 2024-01-10 PROCEDURE — 3074F SYST BP LT 130 MM HG: CPT | Performed by: INTERNAL MEDICINE

## 2024-01-10 PROCEDURE — 1125F AMNT PAIN NOTED PAIN PRSNT: CPT | Performed by: INTERNAL MEDICINE

## 2024-01-10 ASSESSMENT — SLEEP AND FATIGUE QUESTIONNAIRES
HOW LIKELY ARE YOU TO NOD OFF OR FALL ASLEEP WHILE WATCHING TV: MODERATE CHANCE OF DOZING
SITING INACTIVE IN A PUBLIC PLACE LIKE A CLASS ROOM OR A MOVIE THEATER: MODERATE CHANCE OF DOZING
HOW LIKELY ARE YOU TO NOD OFF OR FALL ASLEEP WHILE LYING DOWN TO REST IN THE AFTERNOON WHEN CIRCUMSTANCES PERMIT: MODERATE CHANCE OF DOZING
HOW LIKELY ARE YOU TO NOD OFF OR FALL ASLEEP WHILE SITTING AND TALKING TO SOMEONE: SLIGHT CHANCE OF DOZING
HOW LIKELY ARE YOU TO NOD OFF OR FALL ASLEEP WHEN YOU ARE A PASSENGER IN A CAR FOR AN HOUR WITHOUT A BREAK: MODERATE CHANCE OF DOZING
HOW LIKELY ARE YOU TO NOD OFF OR FALL ASLEEP WHILE SITTING QUIETLY AFTER LUNCH WITHOUT ALCOHOL: MODERATE CHANCE OF DOZING
HOW LIKELY ARE YOU TO NOD OFF OR FALL ASLEEP IN A CAR, WHILE STOPPED FOR A FEW MINUTES IN TRAFFIC: MODERATE CHANCE OF DOZING
HOW LIKELY ARE YOU TO NOD OFF OR FALL ASLEEP WHILE SITTING AND READING: MODERATE CHANCE OF DOZING
ESS-CHAD TOTAL SCORE: 15

## 2024-01-10 ASSESSMENT — PATIENT HEALTH QUESTIONNAIRE - PHQ9
SUM OF ALL RESPONSES TO PHQ9 QUESTIONS 1 AND 2: 0
2. FEELING DOWN, DEPRESSED OR HOPELESS: NOT AT ALL
1. LITTLE INTEREST OR PLEASURE IN DOING THINGS: NOT AT ALL

## 2024-01-10 ASSESSMENT — PAIN SCALES - GENERAL: PAINLEVEL: 5

## 2024-01-10 NOTE — PROGRESS NOTES
[facility]  FOLLOW-UP VISIT NOTE    PCP: Karl Mock DO    HISTORY OF PRESENT ILLNESS     Patient ID: Shabbir Laurent is a 73 y.o. male who presents for follow-up of AUBREY on PAP, yearly checkup.     Patient is here accompanied by wife Virginia who adds to history.  To review, patient's medical history is notable for obesity (BMI 37), HTN, CAD, s/p AVR, TIA, allergic rhinitis, hypothyroidism, inflammatory polyarthropathy, and AUBREY on CPAP.     Interval History  Patient was last seen in 1/6/2023. Plan was to continue PAP and follow-up yearly.  Since last visit, patient has been using machine every night. Current mask interface being used is full face mask. Per records, patient is getting supplies thru [employer]  Patient denies machine problems, mask leak, air hunger, aerophagia, dry mouth, skin irritation, nasal congestion, and snoring on PAP. However, when beard is long, mask starts to leak.   The following are patient's perceived benefits of PAP: no snoring on PAP.    RLS Follow-up:   Once a week, patient complains to wife about crawling sensation on legs. However, every night, he get tingling numbness on feet; hence, he keeps moving his feet in bed during bedtime. Discussed with patient that the feet symptoms is due to neuropathy and not RLS.     SLEEP STUDY HISTORY (personally reviewed raw data such as interpretation report, data sheet, hypnogram, and titration table if available and applicable)  split night PSG 5/21/1999: AHI 38, SpO2 loly 81%. CPAP was started at 5-11 cm H2O. RDI was 0 and SpO2>90% on all pressures tested. AUBREY seemed controlled at CPAP 9 cm H2O.   PAP titration 12/11/2009: CPAP was started at 4-17 cm H2O. RDI 4.3, REM RDI 3.1, SpO2 loly 89%. AUBREY appeared well-controlled at CPAP 17 cm H2O     SLEEP-WAKE SCHEDULE  Bedtime:  10 PM to 10:30 PM daily  Subjective sleep latency: 30-60 minutes due to watching TV and mind-racing  Number of awakenings:   "1-3 times per night spontaneously for unknown reasons, sometimes due to pain  Nocturia: No  Falls back asleep right away, sometimes 2 hours (Patient does not know why he is awake for that long but sometimes due to discomfort or too hot)  Final wake time:  6;30 AM daily  Out of bed time:  6:30 AM daily  Shift work: No   Naps: once daily for 1-2 hours while watching TV  Feels rested after a nap: Yes   Average sleep duration (excluding naps): 6 hours    SLEEP ENVIRONMENT  Sleep location: bed  Sleep status: sleeps with wife  Preferred sleep position: left side    SOCIAL HISTORY  Smoking: no  ETOH: no  Marijuana: no  Caffeine: 2 cups coffee daily in the morning  Sleep aids: yes on melatonin 10 mg    WEIGHT: stable    Claustrophobia: No     Active Problems, Allergy List, Medication List, and PMH/PSH/FH/Social Hx have been reviewed and reconciled in chart. No significant changes unless documented in the pertinent chart section. Updates made when necessary.     PHYSICAL EXAM     VITAL SIGNS: /76   Pulse 69   Resp 18   Ht 1.829 m (6')   Wt 125 kg (276 lb)   SpO2 95%   BMI 37.43 kg/m²     NECK CIRCUMFERENCE: 19.5 inches    Today ESS: 15  Last visit ESS: 10  ANDIE: 13    Physical Exam  Constitutional: Awake, not in distress  Head: normocephalic, atraumatic  Lungs: Clear to auscultation bilateral, no rales  Heart: Regular rate and rhythm, +mechanical click on aortic valve area (2nd R ICS)  Skin: Warm, no rash  Neuro: moves all extremities  Psych: alert and oriented to time, place, and person    RESULTS/DATA     No results found for: \"IRON\", \"TRANSFERRIN\", \"IRONSAT\", \"TIBC\", \"FERRITIN\"    Bicarbonate   Date Value Ref Range Status   11/13/2023 26 21 - 32 mmol/L Final       PAP Adherence  A PAP adherence data was obtained and reviewed personally today in clinic. (see scanned document in EPIC)  Source of data: website download  Machine: [employer]med Airsense 11 Autoset  Setup date:   Settings:  auto-CPAP 10-20 cm H2O and EPR 1 " ramp only  Date Range: 12/6/2023 to 1/4/2023  Days used: 30/30  >4 hrs usage: 100%  Average usage hours (days used): 7 hours 36 mins  Pressure: Median 12.3, 95th percentile 16.1, Maximum 17.8  Leak: 95th percentile 31.4, maximum 57.2  AHI: 4.2 (RERA index 0.3, JONNIE 0.9)  Cheyne-Varghese respiration 2 minutes (1%)    ASSESSMENT/PLAN     Shabbir Laurent is a 73 y.o. male who returns in [facility] for the following problems:    OBSTRUCTIVE SLEEP APNEA, SEVERE (pre-PAP AHI 38)  - Now on auto-CPAP 10-20 cm H2O and EPR 1 ramp only  - Doing well with improved AUBREY symptoms.   - PAP adherence data reviewed today. Usage days 30/30, days> 4 hours at 100%, residual AHI 4.2.   - Continue current machine settings. Continue using machine every night all night. Order placed to renew PAP supplies.   - Continue supportive management as follows: Lose weight. Stay off your back when sleeping. Avoid smoking, alcohol, and sedating medications if applicable. Don't drive when sleepy.    HYPERSOMNIA  - likely due to medications such as Pregabalin and Norco.    CHRONIC INSOMNIA, sleep onset and sleep maintenance  - Sleep-onset insomnia is likely due to poor sleep habits (rumination, spending too much time in bed, daytime napping), and mood disorders (anxiety, depression)  - Sleep maintenance insomnia is likely due to AUBREY, pain, and poor sleep habits  - Discussed stimulus control. The goal of stimulus control is to retrain the patient to associate bedtime and the bed/bedroom with successful sleep. Stimulus control techniques involve going to bed only when sleepy and not fatigued, restricting all non-sleep and non-intimacy activities from bed, getting out of bed if unable to fall asleep within roughly 15 minutes, and avoid napping.   - Tips for good sleep habits: Avoid blue light exposure at bedtime or when awake in the middle of the night. Avoid caffeine, smoking, large meal, exercise, alcohol, and technology near  bedtime. Control environmental noise and temperature in the bedroom. May use and turn on white noise machine, humidifier, or fan while asleep. Avoid clock-watching or worrying if awake in bed. Allot a separate time to write down worries. Maintain a consistent awakening time (fixed wake-up time) regardless of bedtime or sleep quality.    RESTLESS LEG SYNDROME  - Check ferritin and TSAT. Replace if ferritin<75 or TSAT<17%.  - Discussed that RLS is a clinical diagnosis. Discussed triggers of RLS such as caffeine, alcohol, nicotine, medications (antidepressants except Buproprion and Trazodone, 1st generation antihistamines, antipsychotics, anti-emetics except ondansetron), low iron  - Supportive management: Avoid triggers of RLS. Taper off or reduce dose of medications that can cause RLS or switch them to Bupropion if feasible. May take warm bath 2 hours before bedtime. Massage and/or stretch legs while in bed    ALLERGIC RHINITIS   - Start fluticasone nasal spray 2 sprays per nostril once daily 1 hour before bedtime.  - If there is inadequate or lack of improvement on the above intranasal steroid spray, consider adding Azelastine nasal spray, 2 sprays per nostril once daily in the morning.   - Alternatively, may add cetirizine or loratadine 10 mg once daily especially if patient also has watery eyes.     OBESITY   - BMI 37 today  - Recommend weight loss with diet and exercise.  - Weight loss can help in long term management of AUBREY.  - Defer management to PCP    HYPERTENSION  - BP stable today.  - Continue anti-hypertensive medications per PCP.  - Supportive management: low salt DASH diet (less than 2000 mg sodium intake daily), moderate intensity aerobic exercise at least 30 minutes 5 days per week, reduce stress, quit smoking, limit alcohol, lose weight, and monitor BP once daily  - PCP following. Defer management to PCP    All of patient's questions were answered. He verbalizes understanding and agreement with my  assessment and plan. Refer to after-visit-summary (AVS) for patient education and if applicable, for more details on troubleshooting issues with PAP usage, proper cleaning instructions of PAP supplies, and usual recommended replacement schedule for each of the PAP supplies.     Follow-up in 1 year.

## 2024-01-11 NOTE — PATIENT INSTRUCTIONS
"Thank you for coming to the Sleep Medicine Clinic today! Your sleep medicine provider today was: Roselyn Gamboa MD Below is a summary of your treatment plan, patient education, other important information, and our contact numbers.      TREATMENT PLAN     - Continue current machine settings. Continue using machine every night all night. Order placed to renew PAP supplies.   - Obtain iron studies (need at least 8 hours fasting). If ferritin is less than 75, we will need to start iron supplementation. Otherwise, we will consider starting other medication for RLS.   - Please read the \"Patient Education\" section below for more detailed information. Try implementing tips, reminders, strategies, and supportive management.   - If not yet done, please sign up for [employer] to make a future schedule, send prescription requests, or send messages.    Follow-up Appointment:   Follow-up in 1 year.    PATIENT EDUCATION     Obstructive Sleep Apnea (AUBREY) is a sleep disorder where your upper airway muscles relax during sleep and the airway intermittently and repetitively narrows and collapses leading to partially blocked airway (hypopnea) or completely blocked airway (apnea) which, in turn, can disrupt breathing in sleep, lower oxygen levels while you sleep and cause night time wakings. Because both apnea and hypopnea may cause higher carbon dioxide or low oxygen levels, untreated AUBREY can lead to heart arrhythmia, elevation of blood pressure, and make it harder for the body to consolidate memory and facilitate metabolism (leading to higher blood sugars at night). Frequent partial arousals occur during sleep resulting in sleep deprivation and daytime sleepiness. AUBREY is associated with an increased risk of cardiovascular disease, stroke, hypertension, and insulin resistance. Moreover, untreated AUBREY with excessive daytime sleepiness can increase the risk of motor vehicular accidents.    Some conservative strategies for AUBREY regardless of " AUBREY severity are:   Positional therapy - Avoid sleeping on your back.   Healthy diet and regular exercise to optimize weight is highly encouraged.   Avoid alcohol late in the evening and sedative-hypnotics as these substances can make sleep apnea worse.   Improve breathing through the nose with intranasal steroid spray, saline rinse, or antihistamines    Safety: Avoid driving vehicle and operating heavy equipment while sleepy. Drowsy driving may lead to life-threatening motor vehicle accidents. A person driving while sleepy is 5 times more likely to have an accident. If you feel sleepy, pull over and take a short power nap (sleep for less than 30 minutes). Otherwise, ask somebody to drive you.    Treatment options for sleep apnea include weight management, positional therapy, Positive Airway Therapy (PAP) therapy, oral appliance therapy, hypoglossal nerve stimulator (Inspire) and select airway surgeries.    Annual Reminders About Your Sleep Apnea    Your sleep apnea is well controlled based on reviewing your PAP Data Report.     General Reminders:  Continue current machine settings. Continue using machine every night. Need at least 4 hours daily usage.   Remember to clean your mask, tubings, and water chamber regularly as instructed.   Know the replacement schedule of your supplies/ accessories and contact your DME (durable medical equipment) provider if you are due for them.   Avoid driving or operating heavy machinery when drowsy. A person driving while sleepy is 5 times more likely to have an accident. If you feel sleepy, pull over and take a short power nap (sleep for less than 30 minutes). Otherwise, ask somebody to drive you.    [facility] or calling our office if you have any of the following symptoms:  Snoring or stopping breathing while using the machine  Recurrence of fatigue, sleepiness, insomnia, and other symptoms you had prior using machine  Persistent or worsening fatigue or  sleepiness despite regular use of machine  Issues tolerating the machine like bloating sensation, air hunger, too much hot air, too much pressure, taking off mask without recall in the middle of sleep, etc.     Other conservative measures to improve sleep apnea:  Losing weight can lead to some improvement of AUBREY which means you will need lower pressures in machine to control your AUBREY. In some patients, they don't need to use the machine after bariatric surgery. Hence, consider medical and/or surgical weight loss especially if your BMI is more than 35.  Avoiding alcohol, sedative-hypnotics, and sedating medications is imperative as these substances can worsen snoring and sleep apnea  If you have nasal congestion or seasonal allergies, improving your breathing through the nose is critical for treating sleep apnea, tolerating CPAP, and improving your sleep; hence, using intranasal steroid spray like Flonase might help. Make sure you know the proper way to use it.  Stay off your back when sleeping.    Common issues with PAP machine:  Mask gets dislodged when turning to the side: Consider getting a CPAP pillow or switching to a mask with hose on top.     Dry mouth:  Your machine has built-in humidifier that heats up the air to prevent dry mouth. It can be adjusted to your comfort. You can try that first and increase setting one level one night at a time to check which setting is comfortable and effective in lessening dry mouth. In some patients with heated hose, adjusting tube temperature to make air warmer can improve dry mouth. If dry mouth persists despite adjusting humidity or tube temperature setting, may apply OTC Biotene gel over the gums at bedtime.  If Biotene gel is not effective, consider trying XEROSTOM gel from [employer].  Also, eliminate or reduce dose of medications that can cause dry mouth if possible. Lastly, may try getting a separate room humidifier machine.    Airleaks: Please call DME as they may  "need to adjust your mask or refit you with a different kind or different size of mask. In addition, you can ask DME for tips on getting a good mask seal and mask fit.     Difficulty tolerating the mask: Contact your DME to try a different kind of mask and/or call office to get a referral to Sleep Psychologist for CPAP desensitization. CPAP desensitization technique is a set of strategies that helps patient cope with claustrophobia and anxiety related to wearing mask. Alternatively, we can do a daytime mini-sleep study called PAP-nap trial wherein you will try on different kinds of mask and the sleep technician will try different pressure settings on CPAP and BPAP machines to see which specific pressure is tolerable and comfortable for you.     Water droplets or moisture within the hose and/or mask: This is called rain-out and it is caused by condensation of too much heated humidity on the cooler walls of the hose. If you have rain-out, turn down humidity settings or get a heated hose. If you already have a heated hose, turn up the \"tube temperature\" of the heated hose. Alternatively, if you don't want to get a heated hose or warmer air, may wrap the CPAP hose with stockings to keep it somewhat warm. Also, you need to place the machine on the floor and lower the hose so that water won't travel upward towards your mask.     Maintaining your CPAP/BPAP device:    The humidification chamber (aka water tank or water chamber) needs to be filled with distilled water to prevent buildup of white deposits in the future. If you cannot find distilled water, you can use tap water but expect to have white deposits buildup seen after prolonged use with tap water. If you start seeing white deposits on the water chamber, you can clean it by filling it with equal parts of distilled white vinegar and water. Let the vinegar-water mixture sit for 2 hours, and then rinse it with running tap water. Clean with soap and water then let it " dry.     You should try to keep your machine clean in order to work well. Here are some tips to clean PAP supplies / accessories:    Clean the humidification chamber (aka water tank) as well as your mask and tubing at least once a week with soap and water.   Alternatively, you can fill a sink or basin with warm water and add a little mild detergent, like Ivory dish soap. Gently wipe your supplies with the soapy water to free all the oils and dirt that may have collected. Once that's done, rinse these items with clean water until the soap is gone and let them air dry. You can hang your tubing over the curtain amelia in your bathroom so that it dries.  The mask insert (part of the mask that has contact with your skin) needs to be cleaned with soap and water daily. Another option is to wipe them down with CPAP wipes or baby wipes.    You should replace your mask and tubing frequently in order to prevent bacteria buildup, machine damage, and mask seal issues. The older the mask and hose, the high likelihood that there is bacteria buildup in it especially if they are not cleaned regularly. Dirty filters damage machines because build-up of dust and contaminants can cause machine to over-heat, and in time, damage the motor of machine. Cushions lose their seal over time as most masks are made of plastic and silicone while headgear is made of neoprene. These materials will break down with age and frequent use. Here is the recommended replacement schedule for PAP supplies / accessories:    Twice a month- disposable filters and cushions for nasal mask or nasal pillows.  Once a month- cushion for full face mask  Every 3 months- mask with headgear and PAP tubing (standard or heated hose)  Every 6 months- reusable filter, water chamber, and chin strap     Other useful information:    Some people do not put water in the tank while other people prefers to put water in the tank to prevent mouth dryness. Try to experiment to determine  which is more comfortable for you.   In general, new machines have 2 years warranty on parts while health insurance allows you to have a new machine once every 5 years.     Restless Legs Syndrome (RLS) is a clinical diagnosis wherein you have the urge to move your legs while sitting or lying down, occurring usually at night or worse at night, and is partially or completely relieved with movement (massage, stretching, walking etc). RLS is usually related with low iron in the brain and gets worse with caffeine, nicotine, alcohol, and certain medications (antidepressants, antiemetics, sedating antihistamines, etc).    General care for RLS:  If you are taking medications that triggers or worsens RLS, consider consulting with sleep specialist and/or prescribing doctor.  Avoid nicotine, alcohol, and caffeine containing substances, such as chocolate and sodas, as these can make symptoms worse.   Try massage, exercise, leg compression, stretching or warm baths before bed time.   If RLS symptoms occur 2 to 3 times per week, consider checking lab work to see if certain vitamins or minerals are depleted (i.e. iron levels) and/or start medication for RLS (i.e. ropinirole or pramipexole).     Insomnia, or trouble falling asleep or staying asleep, can be caused by many different things such as untreated sleep apnea, anxiety, depression, stress, poor sleep habits, and other medical conditions or medications. The best way to treat insomnia is to treat the cause. In general, we can all benefit from better sleep hygiene (aka good sleep habits). Some recommendations to help you improve your sleep so you can fall asleep, stay asleep, and wake up feeling refreshed include:    Keep a routine bedtime and rise time even on weekends and non-work days. This helps your biological clock stay in sync with your sleep needs. If you currently have variable sleep-wake schedule, start off by waking up and getting out of bed the same time every day,  "even on weekends or non-work days. Whether you have good or poor sleep, waking up at the same time every day can help re-train and reset your body clock.  Go to bed when you are sleepy and not when tired nor before your goal bedtime. Long periods of time in bed will lead to fragmented, shallow, broken sleep.   Use the bed for sleep and intimacy only. Do not watch TV, eat, read or use phone/laptop in bed. Keeping sleep as the only activity in bed will help re-associate bed equals sleep.  Get up when you cannot sleep in 20-30 minutes. When you are unable to sleep, exit the bed and go to another room or chair in bedroom, do something boring, calm, relaxing, distracting, or non-stimulating in dim lighting until you feel sleepy enough to fall back asleep. Avoid stimulating activity or any triggers for mental thinking (e.g. cellphone). Repeat as needed.  Avoid napping during the day to build up sleep pressure so that it won't be hard for you to fall asleep at night. Napping, particularly in the late afternoon or early evening may interfere with your night's sleep. If naps are necessary, limit naps under 15-20 minutes and not later than 3 PM.   Create a \"buffer zone\" or \"wind-down time\". This is a quiet time prior to bedtime, typically at least 30 minutes and perhaps as long as 1-2 hours. During this time, you should do things that are enjoyable, relaxing, and not necessarily goal-oriented like performing relaxation exercises, listening to relaxing music, reading a boring book, dimming the lights, or eating a light bedtime snack like a glass of milk and banana which can promote sleep. Activities that promote relaxation before sleep is important because these can hep quiet the mind and relax the body to get into the right states for sleep.   Do not worry or plan in bed. If you are worrying, planning, feeling anxious, or cannot shut off your thoughts, get up and stay out of bed until you have quieted your mind. Set up a " “scheduled worry time” in the morning or late afternoon to write down your worries and stressors as well as plans for the following day.   Keep your bedroom quiet, dark and cool. Ideal sleeping temperature is 65F. If light bothers you, get a slumber mask or blackout shades. If noise bothers you, put ear plugs or get a white noise machine. Alternatively, you may turn on fan or humidifier to create a constant background noise to eliminate unexpected sounds that would otherwise wake you up in the middle of your sleep.  Keep your bedroom technology free. Avoid TV and electronics 1-2 hours prior to bedtime. Also, turn the clock around to avoid clock-watching. Thoughts of the time ca cause frustration and make it more difficult to go to sleep.   DO NOT TRY TOO HARD TO SLEEP. JUST ALLOW SLEEP TO UNFOLD.  Other things to avoid to help  Avoid  caffeine (including chocolate) intake in the late afternoon and early evening. imit the amount of caffeine and consume before noon.   Avoid smoking 2-3 hours before bedtime. Like caffeine, nicotine in cigarette smoking acts a stimulant.   Avoid alcohol intake 2-3 hours before bedtime. Although alcohol may seem to help you fall asleep more easily as it slows down brain activity, it also disrupts your sleep during the night by causing frequent awakenings as the effect of alcohol wears off. Also, alcohol worsens snoring and sleep apnea  Avoid eating a heavy meal (high-fat, high-carbohydrate, gas-producing foods) at dinner or 2-3 hours before bedtime.    Avoid drinking more than 8 oz liquids in the evening. Restrict fluids after dinner and void at bedtime.  Avoid physical exercise or hot shower / warm bath within 2 hours of bedtime. Regular exercise can improve sleep quality, but exercising or having a warm bath too close to bedtime can disrupt sleep onset. The best time to exercise to help sleep is in the late afternoon or early evening but not within 2 hours of bedtime. Warm baths can be  taken in the evening but not within 2 hours of going to bed.   Avoid sleeping with pets or kids if they appear to bother your sleep and/or sleep quality.    MOST IMPORTANTLY:  BE PATIENT!  BE CONSISTENT!  Your sleep problem developed over time so it will take some time and consistency to return to a more normal sleep pattern. By following the suggestions listed above, you should see gradual sleep improvements over time.    Also you have been recommended to do Cognitive Behavioral Therapy for Insomnia (CBT-I). CBT-I is widely recognized as an effective treatment for a wide range of insomnias. CBT-I is typically made up of a number of components including assessment, behavioral and cognitive interventions, motivational techniques, and relapse prevention skills. An overwhelming amount of evidence suggests that CBT-I is as effective as hypnotics and the newer non-benzodiazepine sleep aides in the acute treatment and is more effective than medication in the long term treatment of insomnia.     Below are some resources for CBT-I:  CBT-I at  with Елена Jered, NP  GoToSleep- online course via [facility]. Self-guided. One time charge to sign up.  SleepEZ- Free self-guided course from the [facility] https://www.veterantraining.va.gov/insomnia/  Dr. Shea - one on one CBT-I service [email or web address]    You can also go to the following EDUCATION WEBSITES for further information:   American Academy of Sleep Medicine http://sleepeducation.org  National Sleep Foundation: https://sleepfoundation.org  American Sleep Apnea Association: https://www.sleepapnea.org (for patients with sleep apnea)  Narcolepsy Network: https://www.narcolepsynetwork.org (for patients with narcolepsy)  [employer]: https://www.wakeupnarcolepsy.org (for patients with narcolepsy)  Hypersomnia Foundation: https://www.hypersomniafoundation.org (for patients with idiopathic hypersomnia)  RLS foundation: https://www.rls.org (for patients with restless leg  syndrome)    IMPORTANT INFORMATION     Call 911 for medical emergencies.  Our offices are generally open from Monday-Friday, 8 am - 5 pm.   There are no supporting services by either the sleep doctors or their staff on weekends and Holidays, or after 5 PM on weekdays.   If you need to get in touch with me, you may either call my office number or you can use [phone].  If a referral for a test, for CPAP, or for another specialist was made, and you have not heard about scheduling this within a week, please call scheduling at [phone] ([phone]).  If you are unable to make your appointment for clinic or an overnight study, kindly call the office or sleep testing center at least 48 hours in advance to cancel and reschedule.  If you are on CPAP, please bring your device's card and/or the device to each clinic appointment.   In case of problems with PAP machine or mask interface, please contact your DME (Durable Medical Equipment) company first. DME is the company who provides you the machine and/or PAP supplies.       PRESCRIPTIONS     We require 7 days advanced notice for prescription refills. If we do not receive the request in this time, we cannot guarantee that your medication will be refilled in time.    IMPORTANT PHONE NUMBERS     Sleep Medicine Clinic Fax: [phone]  Appointments (for Pediatric Sleep Clinic): [phone] - option 1  Appointments (for Adult Sleep Clinic): [phone] - option 2  Appointments (For Sleep Studies): [phone] - option 3  Behavioral Sleep Medicine: [phone]  Sleep Surgery: [phone]  Nutrition Service: [phone]  Weight management clinics with endocrinology: [phone]  Bariatric Services: [phone] (includes weight loss medications and weight loss surgery)  [facility]: [phone] (offers holistic approaches to weight management)  ENT (Otolaryngology): [phone]  Headache Clinic (Neurology): [phone]  Neurology:  [phone]  Psychiatry: [phone]  Pulmonary Function Testing (PFT) Center: [phone]  Pulmonary Medicine: [phone]  Medical Service [place] ([facility]): [phone]      OUR SLEEP TESTING LOCATIONS     Our team will contact you to schedule your sleep study, however, you can contact us as follow:  Main Phone Line (scheduling only): [phone]), option 3  Adult and Pediatric Locations  [facility] (6 years and older): [facility] - 4th floor ([address] [facility]) After hours line: [phone]  [facility] (Main campus: All ages): [facility], 6th floor. After hours line: [phone]   [place] (5 years and older; younger considered on case-by-case basis): [address]; [address], [address]. Scheduling  After hours line: [phone]   [place] (6 years and older): [address]; [address]; [address]   [place] (6 years and older): [address] [facility], [address]  After hours line: [phone]   [place] (13 years and older) in [place]: [address], 2nd floor  After hours line: [phone]  [facility] (13 year and older): [address]  After hours line: [phone]     Adult Only Locations:   [place] (18 years and older): [address] [place] [address], 2nd floor   [place] (18 years and older): [address] [facility]; 4th floor  After hours line: [phone]  [facility] (18 years and older) at [place]: [address] [facility]  After hours line: [phone]     CONTACTING YOUR SLEEP MEDICINE PROVIDER AND SLEEP TEAM      For issues with your machine or mask interface, please call your DME provider first. DME stands for durable medical company. DME is the company who provides you the machine and/or PAP supplies / accessories.   To schedule, cancel, or reschedule SLEEP STUDY APPOINTMENTS, please call the Main Phone Line at [phone] - option 3.   To schedule, cancel,  "or reschedule CLINIC APPOINTMENTS, you can do it in \"MyChart\", call [phone] to speak with my  (Mara Willard), or call the Main Phone Line at [phone] - option 2  For CLINICAL QUESTIONS or MEDICATION REFILLS, please call direct line for Adult Sleep Nurses at [phone].   Lastly, you can also send a message directly to your provider through \"My Chart\", which is the email service through your  Records Account: [email or web address]       Here at [facility], we wish you a restful sleep!    "

## 2024-01-16 DIAGNOSIS — I25.10 CORONARY ARTERY DISEASE INVOLVING NATIVE CORONARY ARTERY OF NATIVE HEART WITHOUT ANGINA PECTORIS: ICD-10-CM

## 2024-01-16 RX ORDER — ROSUVASTATIN CALCIUM 10 MG/1
20 TABLET, COATED ORAL DAILY
Qty: 180 TABLET | Refills: 1 | Status: SHIPPED | OUTPATIENT
Start: 2024-01-16 | End: 2024-01-17 | Stop reason: ALTCHOICE

## 2024-01-17 DIAGNOSIS — I25.10 CORONARY ARTERY DISEASE INVOLVING NATIVE CORONARY ARTERY OF NATIVE HEART WITHOUT ANGINA PECTORIS: Primary | ICD-10-CM

## 2024-01-17 RX ORDER — ROSUVASTATIN CALCIUM 20 MG/1
20 TABLET, COATED ORAL DAILY
Qty: 90 TABLET | Refills: 3 | Status: SHIPPED | OUTPATIENT
Start: 2024-01-17 | End: 2024-04-23 | Stop reason: HOSPADM

## 2024-01-24 ENCOUNTER — CLINICAL SUPPORT (OUTPATIENT)
Dept: PRIMARY CARE | Facility: CLINIC | Age: 74
End: 2024-01-24
Payer: MEDICARE

## 2024-01-24 DIAGNOSIS — E83.10 DISORDER OF IRON METABOLISM: ICD-10-CM

## 2024-01-24 LAB
FERRITIN SERPL-MCNC: 94 NG/ML (ref 20–300)
IRON SATN MFR SERPL: 21 % (ref 25–45)
IRON SERPL-MCNC: 71 UG/DL (ref 35–150)
POC INR: 3.8
POC PROTHROMBIN TIME: NORMAL
TIBC SERPL-MCNC: 334 UG/DL (ref 240–445)
UIBC SERPL-MCNC: 263 UG/DL (ref 110–370)

## 2024-01-24 PROCEDURE — 36415 COLL VENOUS BLD VENIPUNCTURE: CPT

## 2024-01-24 PROCEDURE — 82728 ASSAY OF FERRITIN: CPT

## 2024-01-24 PROCEDURE — 83550 IRON BINDING TEST: CPT

## 2024-01-24 PROCEDURE — 83540 ASSAY OF IRON: CPT

## 2024-01-31 ENCOUNTER — APPOINTMENT (OUTPATIENT)
Dept: PRIMARY CARE | Facility: CLINIC | Age: 74
End: 2024-01-31
Payer: MEDICARE

## 2024-01-31 ENCOUNTER — ANTICOAGULATION - WARFARIN VISIT (OUTPATIENT)
Dept: PRIMARY CARE | Facility: CLINIC | Age: 74
End: 2024-01-31
Payer: COMMERCIAL

## 2024-01-31 DIAGNOSIS — Z95.2 MECHANICAL HEART VALVE PRESENT: ICD-10-CM

## 2024-01-31 LAB
POC INR: 2.5
POC PROTHROMBIN TIME: NORMAL

## 2024-02-08 PROBLEM — I73.9 PERIPHERAL VASCULAR DISEASE (CMS-HCC): Status: RESOLVED | Noted: 2023-03-03 | Resolved: 2024-02-08

## 2024-02-08 PROBLEM — I73.9 PVD (PERIPHERAL VASCULAR DISEASE) (CMS-HCC): Status: ACTIVE | Noted: 2024-02-08

## 2024-02-14 ENCOUNTER — CLINICAL SUPPORT (OUTPATIENT)
Dept: PRIMARY CARE | Facility: CLINIC | Age: 74
End: 2024-02-14
Payer: MEDICARE

## 2024-02-14 DIAGNOSIS — Z95.2 MECHANICAL HEART VALVE PRESENT: ICD-10-CM

## 2024-02-14 LAB
POC INR: 2.7
POC PROTHROMBIN TIME: NORMAL

## 2024-02-21 ENCOUNTER — ANTICOAGULATION - WARFARIN VISIT (OUTPATIENT)
Dept: PRIMARY CARE | Facility: CLINIC | Age: 74
End: 2024-02-21
Payer: COMMERCIAL

## 2024-02-21 DIAGNOSIS — Z95.2 MECHANICAL HEART VALVE PRESENT: ICD-10-CM

## 2024-02-21 LAB
POC INR: 3.2
POC PROTHROMBIN TIME: NORMAL

## 2024-03-13 ENCOUNTER — ANTICOAGULATION - WARFARIN VISIT (OUTPATIENT)
Dept: PRIMARY CARE | Facility: CLINIC | Age: 74
End: 2024-03-13
Payer: COMMERCIAL

## 2024-03-13 ENCOUNTER — APPOINTMENT (OUTPATIENT)
Dept: PRIMARY CARE | Facility: CLINIC | Age: 74
End: 2024-03-13
Payer: MEDICARE

## 2024-03-13 DIAGNOSIS — I25.10 CORONARY ARTERY DISEASE INVOLVING NATIVE CORONARY ARTERY OF NATIVE HEART WITHOUT ANGINA PECTORIS: ICD-10-CM

## 2024-03-13 LAB
POC INR: 2.6
POC PROTHROMBIN TIME: NORMAL

## 2024-03-18 ENCOUNTER — LAB (OUTPATIENT)
Dept: LAB | Facility: LAB | Age: 74
End: 2024-03-18
Payer: MEDICARE

## 2024-03-18 ENCOUNTER — OFFICE VISIT (OUTPATIENT)
Dept: RHEUMATOLOGY | Facility: CLINIC | Age: 74
End: 2024-03-18
Payer: MEDICARE

## 2024-03-18 VITALS
HEIGHT: 72 IN | BODY MASS INDEX: 37.43 KG/M2 | HEART RATE: 60 BPM | SYSTOLIC BLOOD PRESSURE: 137 MMHG | OXYGEN SATURATION: 95 % | DIASTOLIC BLOOD PRESSURE: 78 MMHG | WEIGHT: 276.36 LBS

## 2024-03-18 DIAGNOSIS — M06.00 SERONEGATIVE RHEUMATOID ARTHRITIS (MULTI): ICD-10-CM

## 2024-03-18 DIAGNOSIS — Z79.899 ENCOUNTER FOR LONG-TERM (CURRENT) USE OF MEDICATIONS: ICD-10-CM

## 2024-03-18 DIAGNOSIS — M06.00 SERONEGATIVE RHEUMATOID ARTHRITIS (MULTI): Primary | ICD-10-CM

## 2024-03-18 LAB
ALBUMIN SERPL BCP-MCNC: 4.5 G/DL (ref 3.4–5)
ALP SERPL-CCNC: 84 U/L (ref 33–136)
ALT SERPL W P-5'-P-CCNC: 17 U/L (ref 10–52)
ANION GAP SERPL CALC-SCNC: 17 MMOL/L (ref 10–20)
AST SERPL W P-5'-P-CCNC: 21 U/L (ref 9–39)
BILIRUB SERPL-MCNC: 0.4 MG/DL (ref 0–1.2)
BUN SERPL-MCNC: 22 MG/DL (ref 6–23)
CALCIUM SERPL-MCNC: 9.2 MG/DL (ref 8.6–10.3)
CHLORIDE SERPL-SCNC: 104 MMOL/L (ref 98–107)
CO2 SERPL-SCNC: 21 MMOL/L (ref 21–32)
CREAT SERPL-MCNC: 1.06 MG/DL (ref 0.5–1.3)
CRP SERPL-MCNC: 0.54 MG/DL
EGFRCR SERPLBLD CKD-EPI 2021: 74 ML/MIN/1.73M*2
ERYTHROCYTE [DISTWIDTH] IN BLOOD BY AUTOMATED COUNT: 13.9 % (ref 11.5–14.5)
ERYTHROCYTE [SEDIMENTATION RATE] IN BLOOD BY WESTERGREN METHOD: 18 MM/H (ref 0–20)
GLUCOSE SERPL-MCNC: 94 MG/DL (ref 74–99)
HCT VFR BLD AUTO: 47.4 % (ref 41–52)
HGB BLD-MCNC: 15.8 G/DL (ref 13.5–17.5)
MCH RBC QN AUTO: 30.9 PG (ref 26–34)
MCHC RBC AUTO-ENTMCNC: 33.3 G/DL (ref 32–36)
MCV RBC AUTO: 93 FL (ref 80–100)
NRBC BLD-RTO: 0 /100 WBCS (ref 0–0)
PLATELET # BLD AUTO: 257 X10*3/UL (ref 150–450)
POTASSIUM SERPL-SCNC: 3.9 MMOL/L (ref 3.5–5.3)
PROT SERPL-MCNC: 7.1 G/DL (ref 6.4–8.2)
RBC # BLD AUTO: 5.11 X10*6/UL (ref 4.5–5.9)
SODIUM SERPL-SCNC: 138 MMOL/L (ref 136–145)
WBC # BLD AUTO: 5.9 X10*3/UL (ref 4.4–11.3)

## 2024-03-18 PROCEDURE — 80053 COMPREHEN METABOLIC PANEL: CPT

## 2024-03-18 PROCEDURE — 1160F RVW MEDS BY RX/DR IN RCRD: CPT | Performed by: INTERNAL MEDICINE

## 2024-03-18 PROCEDURE — 3075F SYST BP GE 130 - 139MM HG: CPT | Performed by: INTERNAL MEDICINE

## 2024-03-18 PROCEDURE — 85027 COMPLETE CBC AUTOMATED: CPT

## 2024-03-18 PROCEDURE — 1159F MED LIST DOCD IN RCRD: CPT | Performed by: INTERNAL MEDICINE

## 2024-03-18 PROCEDURE — 3078F DIAST BP <80 MM HG: CPT | Performed by: INTERNAL MEDICINE

## 2024-03-18 PROCEDURE — 36415 COLL VENOUS BLD VENIPUNCTURE: CPT

## 2024-03-18 PROCEDURE — 85652 RBC SED RATE AUTOMATED: CPT

## 2024-03-18 PROCEDURE — 1036F TOBACCO NON-USER: CPT | Performed by: INTERNAL MEDICINE

## 2024-03-18 PROCEDURE — 4010F ACE/ARB THERAPY RXD/TAKEN: CPT | Performed by: INTERNAL MEDICINE

## 2024-03-18 PROCEDURE — 99214 OFFICE O/P EST MOD 30 MIN: CPT | Performed by: INTERNAL MEDICINE

## 2024-03-18 PROCEDURE — 86140 C-REACTIVE PROTEIN: CPT

## 2024-03-18 RX ORDER — NITROGLYCERIN 0.4 MG/1
0.4 TABLET SUBLINGUAL EVERY 5 MIN PRN
COMMUNITY
Start: 2021-05-13

## 2024-03-18 ASSESSMENT — ENCOUNTER SYMPTOMS
APNEA: 1
ABDOMINAL PAIN: 0
SLEEP DISTURBANCE: 1
FATIGUE: 1

## 2024-03-18 NOTE — PROGRESS NOTES
Subjective   Patient ID: Shabbir Laurent is a 73 y.o. male who presents for Follow-up (4 mo fuv).  HPI  Patient with history of seronegative inflammatory polyarthropathy previously seen by Dr. Garibay.  Also has a history of fibromyalgia, osteoarthritis, neuropathy, spinal stenosis, coronary artery disease, hypothyroidism and previously has been seen by neurology and pain management.  Previuosly was on Plaqunil and ssz.  Also has a history of coronary artery disease, mechanical aortic valve on anticoagulation, obstructive sleep apnea, diastolic heart failure, chronic low back pain.    At his last visit in November 2023 we had him continue on leflunomide but was having some increasing symptoms.  We discussed about biologic treatment.  Does still have some elevation of inflammatory markers    He continues with pain management.  The injection he got in November has helped.    Since he got called he has been more achy in his hands.  His left shoulder has been bothering him and he is going to make an appointment with Dr. Napier and he thinks he may need to get it replaced.  His knees also bother him as well and he feels that he probably will need these replaced as well.    Dr. Garibay used to inject his knees and also Dr. Maldonado.  They do not need injection today.  His back symptoms are under control.    He has mostly pain in his legs and neuropathy in his feet.  He does not feel that his leg symptoms are from his back though.  He is not having any sciatica symptoms.     Review of Systems   Constitutional:  Positive for fatigue.   HENT:  Positive for hearing loss.    Respiratory:  Positive for apnea.    Cardiovascular:  Positive for leg swelling. Negative for chest pain.   Gastrointestinal:  Negative for abdominal pain.   Musculoskeletal:         Per HPI   Psychiatric/Behavioral:  Positive for sleep disturbance.        Objective   Physical Exam  GEN: NAD A&O x3 appropriate affect able to get onto the exam table  HENT:no  enlarged glands or thyroid  EYES: no conjunctival redness, eyelids normal  LYMPH: no cervical lymphadenopathy  SKIN: no rashes, ulcers, or subcutaneous nodules  PULSES: +radials  TENDER POINTS: 0/18   MSK:  Swelling more in the MCPs on the right than the left  No tenderness in the DIP or PIPs  Swelling and fullness in the bilateral elbows more on the right than the left unable to fully extend  Decreased range of motion bilateral shoulders  Hypertrophic changes in the bilateral knees  Mild edema in the lower extremities  Assessment/Plan     Seronegative inflammatory arthritis -previously has been on hydroxychloroquine, sulfasalazine, methotrexate   -Currently on leflunomide.  He did have some elevations of inflammatory markers at his last visit.  At this time he does not feel that he is ready to move onto Biologics.  He is able to handle his symptoms even though the cold weather has made his hands more achy.  He feels that he will probably need to replace his knees and shoulders.  Will see him again in 4 to 5 months or as needed

## 2024-04-02 ENCOUNTER — OFFICE VISIT (OUTPATIENT)
Dept: PAIN MEDICINE | Facility: CLINIC | Age: 74
End: 2024-04-02
Payer: MEDICARE

## 2024-04-02 VITALS
DIASTOLIC BLOOD PRESSURE: 71 MMHG | HEART RATE: 63 BPM | SYSTOLIC BLOOD PRESSURE: 143 MMHG | RESPIRATION RATE: 17 BRPM | OXYGEN SATURATION: 95 %

## 2024-04-02 DIAGNOSIS — M51.26 HERNIATED NUCLEUS PULPOSUS, L2-3: ICD-10-CM

## 2024-04-02 DIAGNOSIS — M48.062 NEUROGENIC CLAUDICATION DUE TO LUMBAR SPINAL STENOSIS: ICD-10-CM

## 2024-04-02 DIAGNOSIS — M47.26 OSTEOARTHRITIS OF SPINE WITH RADICULOPATHY, LUMBAR REGION: ICD-10-CM

## 2024-04-02 PROCEDURE — 1036F TOBACCO NON-USER: CPT | Performed by: NURSE PRACTITIONER

## 2024-04-02 PROCEDURE — 3078F DIAST BP <80 MM HG: CPT | Performed by: NURSE PRACTITIONER

## 2024-04-02 PROCEDURE — 1159F MED LIST DOCD IN RCRD: CPT | Performed by: NURSE PRACTITIONER

## 2024-04-02 PROCEDURE — 1160F RVW MEDS BY RX/DR IN RCRD: CPT | Performed by: NURSE PRACTITIONER

## 2024-04-02 PROCEDURE — 1125F AMNT PAIN NOTED PAIN PRSNT: CPT | Performed by: NURSE PRACTITIONER

## 2024-04-02 PROCEDURE — 3077F SYST BP >= 140 MM HG: CPT | Performed by: NURSE PRACTITIONER

## 2024-04-02 PROCEDURE — 99214 OFFICE O/P EST MOD 30 MIN: CPT | Performed by: NURSE PRACTITIONER

## 2024-04-02 PROCEDURE — 4010F ACE/ARB THERAPY RXD/TAKEN: CPT | Performed by: NURSE PRACTITIONER

## 2024-04-02 RX ORDER — HYDROCODONE BITARTRATE AND ACETAMINOPHEN 5; 325 MG/1; MG/1
1 TABLET ORAL 3 TIMES DAILY PRN
Qty: 84 TABLET | Refills: 0 | Status: SHIPPED | OUTPATIENT
Start: 2024-06-05 | End: 2024-04-23 | Stop reason: HOSPADM

## 2024-04-02 RX ORDER — HYDROCODONE BITARTRATE AND ACETAMINOPHEN 5; 325 MG/1; MG/1
1 TABLET ORAL 3 TIMES DAILY PRN
Qty: 84 TABLET | Refills: 0 | Status: SHIPPED | OUTPATIENT
Start: 2024-05-08 | End: 2024-04-23 | Stop reason: HOSPADM

## 2024-04-02 RX ORDER — HYDROCODONE BITARTRATE AND ACETAMINOPHEN 5; 325 MG/1; MG/1
1 TABLET ORAL 3 TIMES DAILY PRN
Qty: 84 TABLET | Refills: 0 | Status: SHIPPED | OUTPATIENT
Start: 2024-04-10 | End: 2024-06-07 | Stop reason: SDUPTHER

## 2024-04-02 ASSESSMENT — ENCOUNTER SYMPTOMS
ARTHRALGIAS: 1
DIZZINESS: 0
SPEECH DIFFICULTY: 0
GASTROINTESTINAL NEGATIVE: 1
MYALGIAS: 1
NECK STIFFNESS: 0
BACK PAIN: 1
CARDIOVASCULAR NEGATIVE: 1
HEADACHES: 0
RESPIRATORY NEGATIVE: 1
NECK PAIN: 0
WEAKNESS: 0
ENDOCRINE NEGATIVE: 1
LIGHT-HEADEDNESS: 0
HEMATOLOGIC/LYMPHATIC NEGATIVE: 1
CONSTITUTIONAL NEGATIVE: 1
EYES NEGATIVE: 1
FACIAL ASYMMETRY: 0
JOINT SWELLING: 0
PSYCHIATRIC NEGATIVE: 1
TREMORS: 0
ALLERGIC/IMMUNOLOGIC NEGATIVE: 1
NUMBNESS: 0
SEIZURES: 0

## 2024-04-02 ASSESSMENT — PAIN SCALES - GENERAL: PAINLEVEL: 5

## 2024-04-02 NOTE — PROGRESS NOTES
Subjective     Shabbir Laurent is a 73 y.o. year old male patient here for chronic pain management.     Follows up for interval reevaluation of his chronic low back pain from lumbar stenosis with neurogenic claudication, degenerative disc and degenerative spondylolisthesis.    Bilateral L3 transforaminal lumbar epidural steroid injection November 1, 2023 reduce pain and improve function up to 50%.  No longer has radiating right-sided greater than left-sided low back pain that radiates to the right and left anterolateral hip and thigh with numbness and weakness.  Injection therapy has helped reduce pain and improve function with standing and walking, walking up and down stairs, getting in and out of the car.      MRI of lumbar spine August 27, 2023.  Which is with stable degenerative changes most pronounced at L2-L3 with superimposed inferior directed right revision, facet hypertrophy, ligament flavum thickening and prominent epidural fat with severe spinal canal narrowing and left and severe right neuroforaminal narrowing, similar to previous.      Norco 5 mg 3 times daily reduces pain and improves function up to 50%.  Average pain score is 6 out of 10.  Denies any side effects from medication.  Has an average quality of family and social life with current condition and treatment.    Toxicology consistent March 8, 2023.  Toxicology today.  Annual controlled substance agreement and opioid risk tool are completed and scanned into the chart April 2, 2024.    Review of Systems   Constitutional: Negative.    HENT: Negative.     Eyes: Negative.    Respiratory: Negative.     Cardiovascular: Negative.    Gastrointestinal: Negative.    Endocrine: Negative.    Genitourinary: Negative.    Musculoskeletal:  Positive for arthralgias, back pain, gait problem and myalgias. Negative for joint swelling, neck pain and neck stiffness.   Skin: Negative.    Allergic/Immunologic: Negative.    Neurological:  Negative for dizziness,  tremors, seizures, syncope, facial asymmetry, speech difficulty, weakness, light-headedness, numbness and headaches.   Hematological: Negative.    Psychiatric/Behavioral: Negative.         Objective   Physical Exam  Vitals and nursing note reviewed.   Constitutional:       Appearance: Normal appearance.   HENT:      Head: Normocephalic and atraumatic.   Eyes:      Conjunctiva/sclera: Conjunctivae normal.   Cardiovascular:      Pulses: Normal pulses.   Pulmonary:      Effort: Pulmonary effort is normal. No respiratory distress.   Musculoskeletal:      Right lower leg: No edema.      Left lower leg: No edema.      Comments: Standing erect and lumbar spine in extension increased back pain.  Flexion relieves back pain.    Ambulates with mildly antalgic gait.   Skin:     General: Skin is warm and dry.      Capillary Refill: Capillary refill takes 2 to 3 seconds.   Neurological:      General: No focal deficit present.      Mental Status: He is alert.      Cranial Nerves: No cranial nerve deficit.      Sensory: No sensory deficit.      Motor: No weakness.      Gait: Gait normal.      Comments: Negative straight leg raise.  No clonus   Psychiatric:         Mood and Affect: Mood normal.         Behavior: Behavior normal.         Assessment/Plan   Problem List Items Addressed This Visit    None  Visit Diagnoses         Codes    Herniated nucleus pulposus, L2-3     M51.26    Neurogenic claudication due to lumbar spinal stenosis     M48.062    Osteoarthritis of spine with radiculopathy, lumbar region     M47.26            Follow-up in 12 weeks.    Alma RENTERIA-CNP

## 2024-04-06 DIAGNOSIS — M06.4 INFLAMMATORY POLYARTHROPATHY (MULTI): ICD-10-CM

## 2024-04-06 DIAGNOSIS — M13.0 POLYARTHRITIS: ICD-10-CM

## 2024-04-06 RX ORDER — LEFLUNOMIDE 20 MG/1
20 TABLET ORAL DAILY
Qty: 90 TABLET | Refills: 0 | Status: SHIPPED | OUTPATIENT
Start: 2024-04-06

## 2024-04-10 ENCOUNTER — CLINICAL SUPPORT (OUTPATIENT)
Dept: PRIMARY CARE | Facility: CLINIC | Age: 74
End: 2024-04-10
Payer: MEDICARE

## 2024-04-10 DIAGNOSIS — Z95.2 HISTORY OF HEART VALVE REPLACEMENT WITH MECHANICAL VALVE: Primary | ICD-10-CM

## 2024-04-10 LAB
POC INR: 2.5 (ref 2.5–3.5)
POC PROTHROMBIN TIME: 29.5

## 2024-04-18 ENCOUNTER — APPOINTMENT (OUTPATIENT)
Dept: CARDIOLOGY | Facility: HOSPITAL | Age: 74
DRG: 322 | End: 2024-04-18
Payer: MEDICARE

## 2024-04-18 ENCOUNTER — APPOINTMENT (OUTPATIENT)
Dept: RADIOLOGY | Facility: HOSPITAL | Age: 74
DRG: 322 | End: 2024-04-18
Payer: MEDICARE

## 2024-04-18 ENCOUNTER — HOSPITAL ENCOUNTER (INPATIENT)
Facility: HOSPITAL | Age: 74
LOS: 5 days | Discharge: HOME | DRG: 322 | End: 2024-04-23
Attending: EMERGENCY MEDICINE | Admitting: INTERNAL MEDICINE
Payer: MEDICARE

## 2024-04-18 DIAGNOSIS — Z95.2 PRESENCE OF PROSTHETIC HEART VALVE: ICD-10-CM

## 2024-04-18 DIAGNOSIS — Z98.890 OTHER SPECIFIED POSTPROCEDURAL STATES: ICD-10-CM

## 2024-04-18 DIAGNOSIS — I21.4 NSTEMI (NON-ST ELEVATED MYOCARDIAL INFARCTION) (MULTI): Primary | ICD-10-CM

## 2024-04-18 LAB
ALBUMIN SERPL BCP-MCNC: 4.3 G/DL (ref 3.4–5)
ALP SERPL-CCNC: 69 U/L (ref 33–136)
ALT SERPL W P-5'-P-CCNC: 31 U/L (ref 10–52)
ANION GAP BLDV CALCULATED.4IONS-SCNC: 10 MMOL/L (ref 10–25)
ANION GAP SERPL CALC-SCNC: 13 MMOL/L (ref 10–20)
APPEARANCE UR: CLEAR
APTT PPP: 47 SECONDS (ref 27–38)
AST SERPL W P-5'-P-CCNC: 83 U/L (ref 9–39)
ATRIAL RATE: 88 BPM
BASE EXCESS BLDV CALC-SCNC: -0.5 MMOL/L (ref -2–3)
BASOPHILS # BLD AUTO: 0.05 X10*3/UL (ref 0–0.1)
BASOPHILS NFR BLD AUTO: 0.5 %
BILIRUB DIRECT SERPL-MCNC: 0.1 MG/DL (ref 0–0.3)
BILIRUB SERPL-MCNC: 0.9 MG/DL (ref 0–1.2)
BILIRUB UR STRIP.AUTO-MCNC: NEGATIVE MG/DL
BNP SERPL-MCNC: 181 PG/ML (ref 0–99)
BODY TEMPERATURE: ABNORMAL
BUN SERPL-MCNC: 20 MG/DL (ref 6–23)
CA-I BLDV-SCNC: 1.15 MMOL/L (ref 1.1–1.33)
CALCIUM SERPL-MCNC: 9.4 MG/DL (ref 8.6–10.3)
CARDIAC TROPONIN I PNL SERPL HS: 6116 NG/L (ref 0–20)
CARDIAC TROPONIN I PNL SERPL HS: 6619 NG/L (ref 0–20)
CARDIAC TROPONIN I PNL SERPL HS: 6868 NG/L (ref 0–20)
CHLORIDE BLDV-SCNC: 104 MMOL/L (ref 98–107)
CHLORIDE SERPL-SCNC: 101 MMOL/L (ref 98–107)
CO2 SERPL-SCNC: 25 MMOL/L (ref 21–32)
COLOR UR: YELLOW
CREAT SERPL-MCNC: 1.16 MG/DL (ref 0.5–1.3)
EGFRCR SERPLBLD CKD-EPI 2021: 67 ML/MIN/1.73M*2
EOSINOPHIL # BLD AUTO: 0.12 X10*3/UL (ref 0–0.4)
EOSINOPHIL NFR BLD AUTO: 1.3 %
ERYTHROCYTE [DISTWIDTH] IN BLOOD BY AUTOMATED COUNT: 13.7 % (ref 11.5–14.5)
FLUAV RNA RESP QL NAA+PROBE: NOT DETECTED
FLUBV RNA RESP QL NAA+PROBE: NOT DETECTED
GLUCOSE BLDV-MCNC: 163 MG/DL (ref 74–99)
GLUCOSE SERPL-MCNC: 154 MG/DL (ref 74–99)
GLUCOSE UR STRIP.AUTO-MCNC: ABNORMAL MG/DL
HCO3 BLDV-SCNC: 24.2 MMOL/L (ref 22–26)
HCT VFR BLD AUTO: 47.6 % (ref 41–52)
HCT VFR BLD EST: 45 % (ref 41–52)
HGB BLD-MCNC: 15.6 G/DL (ref 13.5–17.5)
HGB BLDV-MCNC: 14.9 G/DL (ref 13.5–17.5)
IMM GRANULOCYTES # BLD AUTO: 0.03 X10*3/UL (ref 0–0.5)
IMM GRANULOCYTES NFR BLD AUTO: 0.3 % (ref 0–0.9)
INHALED O2 CONCENTRATION: 21 %
INR PPP: 2.7 (ref 0.9–1.1)
KETONES UR STRIP.AUTO-MCNC: NEGATIVE MG/DL
LACTATE BLDV-SCNC: 1.7 MMOL/L (ref 0.4–2)
LEUKOCYTE ESTERASE UR QL STRIP.AUTO: NEGATIVE
LYMPHOCYTES # BLD AUTO: 1.6 X10*3/UL (ref 0.8–3)
LYMPHOCYTES NFR BLD AUTO: 17.2 %
MAGNESIUM SERPL-MCNC: 1.91 MG/DL (ref 1.6–2.4)
MCH RBC QN AUTO: 30.1 PG (ref 26–34)
MCHC RBC AUTO-ENTMCNC: 32.8 G/DL (ref 32–36)
MCV RBC AUTO: 92 FL (ref 80–100)
MONOCYTES # BLD AUTO: 1.16 X10*3/UL (ref 0.05–0.8)
MONOCYTES NFR BLD AUTO: 12.5 %
NEUTROPHILS # BLD AUTO: 6.33 X10*3/UL (ref 1.6–5.5)
NEUTROPHILS NFR BLD AUTO: 68.2 %
NITRITE UR QL STRIP.AUTO: NEGATIVE
NRBC BLD-RTO: 0 /100 WBCS (ref 0–0)
OXYHGB MFR BLDV: 61.1 % (ref 45–75)
P AXIS: 108 DEGREES
P OFFSET: 167 MS
P ONSET: 133 MS
PCO2 BLDV: 39 MM HG (ref 41–51)
PH BLDV: 7.4 PH (ref 7.33–7.43)
PH UR STRIP.AUTO: 5 [PH]
PLATELET # BLD AUTO: 267 X10*3/UL (ref 150–450)
PO2 BLDV: 38 MM HG (ref 35–45)
POTASSIUM BLDV-SCNC: 3.6 MMOL/L (ref 3.5–5.3)
POTASSIUM SERPL-SCNC: 4.7 MMOL/L (ref 3.5–5.3)
PR INTERVAL: 158 MS
PROT SERPL-MCNC: 7.8 G/DL (ref 6.4–8.2)
PROT UR STRIP.AUTO-MCNC: NEGATIVE MG/DL
PROTHROMBIN TIME: 30.3 SECONDS (ref 9.8–12.8)
Q ONSET: 212 MS
QRS COUNT: 14 BEATS
QRS DURATION: 108 MS
QT INTERVAL: 372 MS
QTC CALCULATION(BAZETT): 450 MS
QTC FREDERICIA: 422 MS
R AXIS: 102 DEGREES
RBC # BLD AUTO: 5.18 X10*6/UL (ref 4.5–5.9)
RBC # UR STRIP.AUTO: NEGATIVE /UL
SAO2 % BLDV: 62 % (ref 45–75)
SARS-COV-2 RNA RESP QL NAA+PROBE: NOT DETECTED
SODIUM BLDV-SCNC: 135 MMOL/L (ref 136–145)
SODIUM SERPL-SCNC: 134 MMOL/L (ref 136–145)
SP GR UR STRIP.AUTO: 1.03
T AXIS: 5 DEGREES
T OFFSET: 398 MS
T4 FREE SERPL-MCNC: 0.69 NG/DL (ref 0.61–1.12)
TSH SERPL-ACNC: 6.54 MIU/L (ref 0.44–3.98)
UFH PPP CHRO-ACNC: 0.1 IU/ML
UROBILINOGEN UR STRIP.AUTO-MCNC: <2 MG/DL
VENTRICULAR RATE: 88 BPM
WBC # BLD AUTO: 9.3 X10*3/UL (ref 4.4–11.3)

## 2024-04-18 PROCEDURE — 83880 ASSAY OF NATRIURETIC PEPTIDE: CPT

## 2024-04-18 PROCEDURE — 84484 ASSAY OF TROPONIN QUANT: CPT | Performed by: EMERGENCY MEDICINE

## 2024-04-18 PROCEDURE — 84075 ASSAY ALKALINE PHOSPHATASE: CPT | Performed by: STUDENT IN AN ORGANIZED HEALTH CARE EDUCATION/TRAINING PROGRAM

## 2024-04-18 PROCEDURE — 99285 EMERGENCY DEPT VISIT HI MDM: CPT | Mod: 25

## 2024-04-18 PROCEDURE — 2500000001 HC RX 250 WO HCPCS SELF ADMINISTERED DRUGS (ALT 637 FOR MEDICARE OP)

## 2024-04-18 PROCEDURE — 96374 THER/PROPH/DIAG INJ IV PUSH: CPT

## 2024-04-18 PROCEDURE — 2500000001 HC RX 250 WO HCPCS SELF ADMINISTERED DRUGS (ALT 637 FOR MEDICARE OP): Performed by: STUDENT IN AN ORGANIZED HEALTH CARE EDUCATION/TRAINING PROGRAM

## 2024-04-18 PROCEDURE — 83735 ASSAY OF MAGNESIUM: CPT | Performed by: EMERGENCY MEDICINE

## 2024-04-18 PROCEDURE — 85025 COMPLETE CBC W/AUTO DIFF WBC: CPT | Performed by: EMERGENCY MEDICINE

## 2024-04-18 PROCEDURE — 84439 ASSAY OF FREE THYROXINE: CPT | Performed by: STUDENT IN AN ORGANIZED HEALTH CARE EDUCATION/TRAINING PROGRAM

## 2024-04-18 PROCEDURE — 85610 PROTHROMBIN TIME: CPT | Performed by: STUDENT IN AN ORGANIZED HEALTH CARE EDUCATION/TRAINING PROGRAM

## 2024-04-18 PROCEDURE — 96361 HYDRATE IV INFUSION ADD-ON: CPT

## 2024-04-18 PROCEDURE — 36415 COLL VENOUS BLD VENIPUNCTURE: CPT | Performed by: STUDENT IN AN ORGANIZED HEALTH CARE EDUCATION/TRAINING PROGRAM

## 2024-04-18 PROCEDURE — 94660 CPAP INITIATION&MGMT: CPT

## 2024-04-18 PROCEDURE — 85520 HEPARIN ASSAY: CPT | Performed by: INTERNAL MEDICINE

## 2024-04-18 PROCEDURE — 71275 CT ANGIOGRAPHY CHEST: CPT

## 2024-04-18 PROCEDURE — 84443 ASSAY THYROID STIM HORMONE: CPT | Performed by: STUDENT IN AN ORGANIZED HEALTH CARE EDUCATION/TRAINING PROGRAM

## 2024-04-18 PROCEDURE — 1200000002 HC GENERAL ROOM WITH TELEMETRY DAILY

## 2024-04-18 PROCEDURE — 2500000004 HC RX 250 GENERAL PHARMACY W/ HCPCS (ALT 636 FOR OP/ED): Performed by: STUDENT IN AN ORGANIZED HEALTH CARE EDUCATION/TRAINING PROGRAM

## 2024-04-18 PROCEDURE — 36415 COLL VENOUS BLD VENIPUNCTURE: CPT | Performed by: EMERGENCY MEDICINE

## 2024-04-18 PROCEDURE — 84132 ASSAY OF SERUM POTASSIUM: CPT | Performed by: STUDENT IN AN ORGANIZED HEALTH CARE EDUCATION/TRAINING PROGRAM

## 2024-04-18 PROCEDURE — 2500000006 HC RX 250 W HCPCS SELF ADMINISTERED DRUGS (ALT 637 FOR ALL PAYERS)

## 2024-04-18 PROCEDURE — 80048 BASIC METABOLIC PNL TOTAL CA: CPT | Performed by: EMERGENCY MEDICINE

## 2024-04-18 PROCEDURE — 93005 ELECTROCARDIOGRAM TRACING: CPT

## 2024-04-18 PROCEDURE — 87636 SARSCOV2 & INF A&B AMP PRB: CPT | Performed by: STUDENT IN AN ORGANIZED HEALTH CARE EDUCATION/TRAINING PROGRAM

## 2024-04-18 PROCEDURE — 84484 ASSAY OF TROPONIN QUANT: CPT | Performed by: STUDENT IN AN ORGANIZED HEALTH CARE EDUCATION/TRAINING PROGRAM

## 2024-04-18 PROCEDURE — 71275 CT ANGIOGRAPHY CHEST: CPT | Performed by: RADIOLOGY

## 2024-04-18 PROCEDURE — 81003 URINALYSIS AUTO W/O SCOPE: CPT | Performed by: STUDENT IN AN ORGANIZED HEALTH CARE EDUCATION/TRAINING PROGRAM

## 2024-04-18 PROCEDURE — 2550000001 HC RX 255 CONTRASTS: Performed by: EMERGENCY MEDICINE

## 2024-04-18 PROCEDURE — 71045 X-RAY EXAM CHEST 1 VIEW: CPT

## 2024-04-18 PROCEDURE — 71045 X-RAY EXAM CHEST 1 VIEW: CPT | Performed by: RADIOLOGY

## 2024-04-18 RX ORDER — ACETAMINOPHEN 325 MG/1
650 TABLET ORAL EVERY 4 HOURS PRN
Status: DISCONTINUED | OUTPATIENT
Start: 2024-04-18 | End: 2024-04-23 | Stop reason: HOSPADM

## 2024-04-18 RX ORDER — METOPROLOL TARTRATE 25 MG/1
25 TABLET, FILM COATED ORAL 2 TIMES DAILY
Status: DISCONTINUED | OUTPATIENT
Start: 2024-04-18 | End: 2024-04-23 | Stop reason: HOSPADM

## 2024-04-18 RX ORDER — WARFARIN SODIUM 5 MG/1
1.5 TABLET ORAL
COMMUNITY

## 2024-04-18 RX ORDER — FOLIC ACID 1 MG/1
1 TABLET ORAL DAILY
Status: DISCONTINUED | OUTPATIENT
Start: 2024-04-19 | End: 2024-04-23 | Stop reason: HOSPADM

## 2024-04-18 RX ORDER — ATORVASTATIN CALCIUM 40 MG/1
40 TABLET, FILM COATED ORAL NIGHTLY
Status: DISCONTINUED | OUTPATIENT
Start: 2024-04-18 | End: 2024-04-23 | Stop reason: HOSPADM

## 2024-04-18 RX ORDER — ASPIRIN 81 MG/1
81 TABLET ORAL DAILY
Status: DISCONTINUED | OUTPATIENT
Start: 2024-04-19 | End: 2024-04-19

## 2024-04-18 RX ORDER — ASPIRIN 81 MG/1
81 TABLET ORAL DAILY
Status: DISCONTINUED | OUTPATIENT
Start: 2024-04-19 | End: 2024-04-23 | Stop reason: HOSPADM

## 2024-04-18 RX ORDER — HEPARIN SODIUM 10000 [USP'U]/100ML
0-4000 INJECTION, SOLUTION INTRAVENOUS CONTINUOUS
Status: DISCONTINUED | OUTPATIENT
Start: 2024-04-18 | End: 2024-04-23 | Stop reason: HOSPADM

## 2024-04-18 RX ORDER — LOSARTAN POTASSIUM 50 MG/1
100 TABLET ORAL DAILY
Status: DISCONTINUED | OUTPATIENT
Start: 2024-04-19 | End: 2024-04-23 | Stop reason: HOSPADM

## 2024-04-18 RX ORDER — CHOLECALCIFEROL (VITAMIN D3) 25 MCG
2000 TABLET ORAL DAILY
Status: DISCONTINUED | OUTPATIENT
Start: 2024-04-19 | End: 2024-04-19

## 2024-04-18 RX ORDER — LEVOTHYROXINE SODIUM 137 UG/1
137 TABLET ORAL DAILY
Status: DISCONTINUED | OUTPATIENT
Start: 2024-04-19 | End: 2024-04-23 | Stop reason: HOSPADM

## 2024-04-18 RX ORDER — POLYETHYLENE GLYCOL 3350 17 G/17G
17 POWDER, FOR SOLUTION ORAL DAILY
Status: DISCONTINUED | OUTPATIENT
Start: 2024-04-18 | End: 2024-04-23

## 2024-04-18 RX ORDER — FUROSEMIDE 40 MG/1
20 TABLET ORAL 3 TIMES WEEKLY
Status: DISCONTINUED | OUTPATIENT
Start: 2024-04-19 | End: 2024-04-23 | Stop reason: HOSPADM

## 2024-04-18 RX ORDER — LORATADINE 10 MG/1
10 TABLET ORAL DAILY
Status: DISCONTINUED | OUTPATIENT
Start: 2024-04-19 | End: 2024-04-23 | Stop reason: HOSPADM

## 2024-04-18 RX ORDER — NITROGLYCERIN 0.4 MG/1
0.4 TABLET SUBLINGUAL EVERY 5 MIN PRN
Status: DISCONTINUED | OUTPATIENT
Start: 2024-04-18 | End: 2024-04-23 | Stop reason: HOSPADM

## 2024-04-18 RX ORDER — LEFLUNOMIDE 10 MG/1
20 TABLET ORAL DAILY
Status: DISCONTINUED | OUTPATIENT
Start: 2024-04-19 | End: 2024-04-23 | Stop reason: HOSPADM

## 2024-04-18 RX ORDER — HYDROCODONE BITARTRATE AND ACETAMINOPHEN 5; 325 MG/1; MG/1
1 TABLET ORAL 3 TIMES DAILY PRN
Status: DISCONTINUED | OUTPATIENT
Start: 2024-04-18 | End: 2024-04-23 | Stop reason: HOSPADM

## 2024-04-18 RX ORDER — TALC
3 POWDER (GRAM) TOPICAL NIGHTLY PRN
Status: DISCONTINUED | OUTPATIENT
Start: 2024-04-18 | End: 2024-04-23 | Stop reason: HOSPADM

## 2024-04-18 RX ORDER — FLUOXETINE HYDROCHLORIDE 20 MG/1
20 CAPSULE ORAL DAILY
Status: DISCONTINUED | OUTPATIENT
Start: 2024-04-19 | End: 2024-04-23 | Stop reason: HOSPADM

## 2024-04-18 RX ORDER — NAPROXEN SODIUM 220 MG/1
324 TABLET, FILM COATED ORAL ONCE
Status: COMPLETED | OUTPATIENT
Start: 2024-04-18 | End: 2024-04-18

## 2024-04-18 RX ORDER — METFORMIN HYDROCHLORIDE 850 MG/1
850 TABLET ORAL
Status: DISCONTINUED | OUTPATIENT
Start: 2024-04-19 | End: 2024-04-19

## 2024-04-18 RX ADMIN — METOPROLOL TARTRATE 25 MG: 25 TABLET, FILM COATED ORAL at 21:32

## 2024-04-18 RX ADMIN — TICAGRELOR 180 MG: 90 TABLET ORAL at 21:46

## 2024-04-18 RX ADMIN — IOHEXOL 78 ML: 350 INJECTION, SOLUTION INTRAVENOUS at 14:30

## 2024-04-18 RX ADMIN — PREGABALIN 200 MG: 75 CAPSULE ORAL at 21:46

## 2024-04-18 RX ADMIN — ATORVASTATIN CALCIUM 40 MG: 40 TABLET, FILM COATED ORAL at 21:32

## 2024-04-18 RX ADMIN — Medication 3 MG: at 21:32

## 2024-04-18 RX ADMIN — SODIUM CHLORIDE, POTASSIUM CHLORIDE, SODIUM LACTATE AND CALCIUM CHLORIDE 1000 ML: 600; 310; 30; 20 INJECTION, SOLUTION INTRAVENOUS at 13:21

## 2024-04-18 RX ADMIN — HEPARIN SODIUM 1000 UNITS/HR: 10000 INJECTION, SOLUTION INTRAVENOUS at 16:26

## 2024-04-18 RX ADMIN — ASPIRIN 81 MG 324 MG: 81 TABLET ORAL at 13:21

## 2024-04-18 SDOH — SOCIAL STABILITY: SOCIAL INSECURITY: ARE YOU OR HAVE YOU BEEN THREATENED OR ABUSED PHYSICALLY, EMOTIONALLY, OR SEXUALLY BY ANYONE?: NO

## 2024-04-18 SDOH — SOCIAL STABILITY: SOCIAL INSECURITY: DO YOU FEEL ANYONE HAS EXPLOITED OR TAKEN ADVANTAGE OF YOU FINANCIALLY OR OF YOUR PERSONAL PROPERTY?: NO

## 2024-04-18 SDOH — SOCIAL STABILITY: SOCIAL INSECURITY: ABUSE: ADULT

## 2024-04-18 SDOH — SOCIAL STABILITY: SOCIAL INSECURITY: WERE YOU ABLE TO COMPLETE ALL THE BEHAVIORAL HEALTH SCREENINGS?: YES

## 2024-04-18 SDOH — SOCIAL STABILITY: SOCIAL INSECURITY: DO YOU FEEL UNSAFE GOING BACK TO THE PLACE WHERE YOU ARE LIVING?: NO

## 2024-04-18 SDOH — SOCIAL STABILITY: SOCIAL INSECURITY: ARE THERE ANY APPARENT SIGNS OF INJURIES/BEHAVIORS THAT COULD BE RELATED TO ABUSE/NEGLECT?: NO

## 2024-04-18 SDOH — SOCIAL STABILITY: SOCIAL INSECURITY: DOES ANYONE TRY TO KEEP YOU FROM HAVING/CONTACTING OTHER FRIENDS OR DOING THINGS OUTSIDE YOUR HOME?: NO

## 2024-04-18 SDOH — SOCIAL STABILITY: SOCIAL INSECURITY: HAVE YOU HAD ANY THOUGHTS OF HARMING ANYONE ELSE?: NO

## 2024-04-18 SDOH — SOCIAL STABILITY: SOCIAL INSECURITY: HAS ANYONE EVER THREATENED TO HURT YOUR FAMILY OR YOUR PETS?: NO

## 2024-04-18 SDOH — SOCIAL STABILITY: SOCIAL INSECURITY: HAVE YOU HAD THOUGHTS OF HARMING ANYONE ELSE?: NO

## 2024-04-18 ASSESSMENT — PAIN SCALES - GENERAL
PAINLEVEL_OUTOF10: 0 - NO PAIN

## 2024-04-18 ASSESSMENT — COGNITIVE AND FUNCTIONAL STATUS - GENERAL
MOBILITY SCORE: 23
DAILY ACTIVITIY SCORE: 24
CLIMB 3 TO 5 STEPS WITH RAILING: A LITTLE
MOBILITY SCORE: 23
PATIENT BASELINE BEDBOUND: NO
DAILY ACTIVITIY SCORE: 24
CLIMB 3 TO 5 STEPS WITH RAILING: A LITTLE

## 2024-04-18 ASSESSMENT — ACTIVITIES OF DAILY LIVING (ADL)
JUDGMENT_ADEQUATE_SAFELY_COMPLETE_DAILY_ACTIVITIES: YES
DRESSING YOURSELF: INDEPENDENT
HEARING - LEFT EAR: HEARING AID
JUDGMENT_ADEQUATE_SAFELY_COMPLETE_DAILY_ACTIVITIES: YES
FEEDING YOURSELF: INDEPENDENT
BATHING: INDEPENDENT
WALKS IN HOME: INDEPENDENT
GROOMING: INDEPENDENT
HEARING - RIGHT EAR: HEARING AID
LACK_OF_TRANSPORTATION: NO
ADEQUATE_TO_COMPLETE_ADL: YES
PATIENT'S MEMORY ADEQUATE TO SAFELY COMPLETE DAILY ACTIVITIES?: YES
PATIENT'S MEMORY ADEQUATE TO SAFELY COMPLETE DAILY ACTIVITIES?: YES
ADEQUATE_TO_COMPLETE_ADL: YES
TOILETING: INDEPENDENT

## 2024-04-18 ASSESSMENT — PATIENT HEALTH QUESTIONNAIRE - PHQ9
2. FEELING DOWN, DEPRESSED OR HOPELESS: NOT AT ALL
1. LITTLE INTEREST OR PLEASURE IN DOING THINGS: NOT AT ALL
SUM OF ALL RESPONSES TO PHQ9 QUESTIONS 1 & 2: 0

## 2024-04-18 ASSESSMENT — LIFESTYLE VARIABLES
AUDIT-C TOTAL SCORE: 0
HAVE PEOPLE ANNOYED YOU BY CRITICIZING YOUR DRINKING: NO
HOW OFTEN DO YOU HAVE A DRINK CONTAINING ALCOHOL: NEVER
EVER FELT BAD OR GUILTY ABOUT YOUR DRINKING: NO
HAVE YOU EVER FELT YOU SHOULD CUT DOWN ON YOUR DRINKING: NO
HOW MANY STANDARD DRINKS CONTAINING ALCOHOL DO YOU HAVE ON A TYPICAL DAY: PATIENT DOES NOT DRINK
TOTAL SCORE: 0
SKIP TO QUESTIONS 9-10: 1
HOW OFTEN DO YOU HAVE 6 OR MORE DRINKS ON ONE OCCASION: NEVER
AUDIT-C TOTAL SCORE: 0
EVER HAD A DRINK FIRST THING IN THE MORNING TO STEADY YOUR NERVES TO GET RID OF A HANGOVER: NO

## 2024-04-18 ASSESSMENT — PAIN - FUNCTIONAL ASSESSMENT: PAIN_FUNCTIONAL_ASSESSMENT: 0-10

## 2024-04-18 ASSESSMENT — COLUMBIA-SUICIDE SEVERITY RATING SCALE - C-SSRS
1. IN THE PAST MONTH, HAVE YOU WISHED YOU WERE DEAD OR WISHED YOU COULD GO TO SLEEP AND NOT WAKE UP?: NO
2. HAVE YOU ACTUALLY HAD ANY THOUGHTS OF KILLING YOURSELF?: NO
6. HAVE YOU EVER DONE ANYTHING, STARTED TO DO ANYTHING, OR PREPARED TO DO ANYTHING TO END YOUR LIFE?: NO

## 2024-04-18 NOTE — Clinical Note
Patient called back.  Pt given message below from Dr. Aguilar.     Vessel: circumflex (1st marginal). Balloon inserted.

## 2024-04-18 NOTE — Clinical Note
Angioplasty of the left anterior descending lesion. Inflation 1: Pressure = 20 kole; Duration = 20 sec. Inflation 2: Pressure = 24 kole; Duration = 26 sec.

## 2024-04-18 NOTE — ED PROVIDER NOTES
HPI   Chief Complaint   Patient presents with    Hypotension       HPI  The patient is a 73-year-old male with past medical history of hypertension, type 2 diabetes (non-insulin-dependent, hyperlipidemia, hypothyroidism, CAD, aortic valve replacement with thoracic aortic aneurysm repair (on Coumadin) and peripheral vascular disease who presents emergency department chief complaint of generalized weakness.  Patient has had several days of feeling generally weak compared to prior.  He denies any current chest pain but did have an episode on Tuesday (4/16/2024) that lasted around 5 or 6 hours of chest discomfort felt burning that he interpreted as heartburn and was resolved with antacid administration the night.  Since that time he has had some poor p.o. intake.  No palpitations or syncope.  He did measure a low blood pressure today at his home in the 90s systolic over 60s.  Cough, fevers, abdominal pain, nausea or vomiting.  No recent episodes of diarrhea.      PMH:as above.  Meds:reviewed in EMR.  PSH:reviewed in EMR.  allergies:reviewed in EMR.  social:Denies X3.  Family History: non-contributory to acute presentation.    A full 10 point Review of Systems was reviewed with the patient and is negative unless stated in the HPI.                  Manheim Coma Scale Score: 15                     Patient History   Past Medical History:   Diagnosis Date    Abrasion, right lower leg, initial encounter 09/26/2019    Leg abrasion, right, initial encounter    Body mass index (BMI) 37.0-37.9, adult 02/28/2020    BMI 37.0-37.9, adult    Corns and callosities 03/22/2019    Callus of foot    Diverticulosis of intestine, part unspecified, without perforation or abscess without bleeding     Diverticulosis    Other conditions influencing health status 08/30/2016    Bleeding from varicose vein, right    Other forms of angina pectoris (CMS-[facility])     Chronic stable angina    Other hyperlipidemia     Other hyperlipidemia    Other  postherpetic nervous system involvement 10/07/2015    Herpes zoster virus infection of face and ear nerves    Pain in right ankle and joints of right foot 01/15/2020    Acute right ankle pain    Personal history of colonic polyps     History of colonic polyps    Personal history of diseases of the skin and subcutaneous tissue 09/29/2016    History of folliculitis    Personal history of other (healed) physical injury and trauma 08/11/2014    History of sprain of elbow    Personal history of other diseases of the circulatory system     History of aortic valve stenosis    Personal history of other diseases of the musculoskeletal system and connective tissue     History of fibromyalgia    Personal history of other diseases of the musculoskeletal system and connective tissue 07/06/2013    History of low back pain    Personal history of other diseases of the nervous system and sense organs 10/28/2013    History of subconjunctival hemorrhage    Personal history of other diseases of the respiratory system 03/04/2019    History of acute bronchitis    Personal history of other endocrine, nutritional and metabolic disease     History of obesity    Personal history of other endocrine, nutritional and metabolic disease 05/02/2019    History of type 2 diabetes mellitus    Personal history of other endocrine, nutritional and metabolic disease 05/10/2018    History of hyperglycemia    Personal history of other mental and behavioral disorders 04/07/2020    History of depression    Personal history of other specified conditions     History of shortness of breath    Personal history of other specified conditions     History of palpitations    Prediabetes 10/04/2018    Pre-diabetes    Presence of aortocoronary bypass graft     S/P CABG x 1    Presence of prosthetic heart valve     Status post mechanical aortic valve replacement    Rash and other nonspecific skin eruption 01/10/2017    Rash, skin    Spondylosis without myelopathy or  radiculopathy, lumbar region 07/06/2013    Lumbar spondylosis    Strain of unspecified muscles, fascia and tendons at thigh level, unspecified thigh, initial encounter 12/05/2013    Muscle strain of thigh    Unspecified asthma, uncomplicated ([facility]) 03/18/2019    Asthmatic bronchitis    Unspecified fall, initial encounter 09/26/2019    Fall at home, initial encounter     Past Surgical History:   Procedure Laterality Date    AORTIC VALVE REPLACEMENT  06/25/2014    Aortic Valve Replacement    CARDIAC CATHETERIZATION  04/23/2018    Cardiac Cath Procedure Summary    COLONOSCOPY  05/16/2013    Complete Colonoscopy    COLONOSCOPY  09/21/2017    Colonoscopy (Fiberoptic)    COLONOSCOPY  07/25/2014    Colonoscopy (Fiberoptic)    CORONARY ARTERY BYPASS GRAFT  04/23/2018    CABG    GALLBLADDER SURGERY  10/22/2013    Gallbladder Surgery    KNEE ARTHROSCOPY W/ DEBRIDEMENT  10/22/2013    Arthroscopy Knee Left    OTHER SURGICAL HISTORY  04/23/2018    History Of Prior Surgery    OTHER SURGICAL HISTORY  10/02/2014    Laser Suite Retina Laser Treatment    OTHER SURGICAL HISTORY  08/03/2022    Uvulopalatopharyngoplasty    OTHER SURGICAL HISTORY  08/03/2022    Tonsillectomy with adenoidectomy    OTHER SURGICAL HISTORY  10/28/2013    Heart Valve Replacement     Family History   Problem Relation Name Age of Onset    Arthritis Mother      Other (Stroke Syndrome) Mother      Arthritis Father      Other (CABG) Father      Sleep apnea Son       Social History     Tobacco Use    Smoking status: Former     Types: Cigarettes    Smokeless tobacco: Never   Substance Use Topics    Alcohol use: Never    Drug use: Never       Physical Exam   ED Triage Vitals [04/18/24 1146]   Temperature Heart Rate Respirations BP   36.6 °C (97.9 °F) 91 20 103/59      Pulse Ox Temp Source Heart Rate Source Patient Position   96 % Temporal Monitor --      BP Location FiO2 (%)     -- --       Physical Exam    Physical Exam:    Appearance: Alert, oriented ,  cooperative,  in no acute distress. Well nourished & well hydrated.    Skin: Intact,  dry skin, no lesions, rash, petechiae or purpura.     Eyes: PERRLA, EOMs intact,  No scleral injection. No scleral icterus.     ENT: Hearing grossly intact. External auditory canals patent. Nares patent, mucus membranes moist. Dentition without lesions.     Neck: Supple, without meningismus. Trachea at midline.     Pulmonary: Clear bilaterally with good chest wall excursion. No rales, rhonchi or wheezing. No accessory muscle use or stridor.    Cardiac: Normal S1, S2 without murmur, rub, gallop or extrasystole. No JVD, Carotids without bruits.    Abdomen: Soft, nontender.  No palpable organomegaly.  No rebound or guarding.      Genitourinary: Exam deferred.    Musculoskeletal:  no edema, or deformity. Pulses full and equal. No cyanosis or clubbing.    Neurological:  Moving all 4 extremities equally, no focal findings identified    Psychiatric: Appropriate mood and affect.     ED Course & MDM   Diagnoses as of 04/20/24 0350   NSTEMI (non-ST elevated myocardial infarction) (Multi)   Initial EKG showed normal sinus rhythm with premature atrial complexes at a rate of 88 bpm.  There is right axis deviation present.  No heart block or QTc interval prolongation.  There is an S1Q3T3 pattern which is new.  There are also T wave inversions in aVF.  No ST segment changes concerning for ischemia.  When compared to April/29 /2014, resolution of sinus bradycardia and new T wave inversions in 3 and aVF as well as the right axis deviation.    Repeat EKG showedl sinus rhythm at a rate of 90 bpm with sinus arrhythmia.  There is right axis deviation present.  No heart block.  QTc is borderline prolonged at 467 ms.  No ST segment or T wave changes concerning for ischemia.  When compared to EKG done prior same day, improvement in T wave inversions in 3 and aVF.    Medical Decision Making  The patient is a 73-year-old male with hypertension, type 2  "diabetes, hyperlipidemia, hypothyroidism, coronary artery disease, aortic valve replacement and thoracic aortic aneurysm repair who presented with generalized weakness and hypotension in the setting of an episode of \"heartburn\" 2 days ago.  Patient was hemodynamically stable and afebrile on arrival.  Well-appearing on exam without any diaphoresis or Franklin sign.  Point-of-care ultrasound performed showed no regional wall motion abnormalities and trace pericardial effusion without tamponade physiology.  No overt right heart strain.  Diminished systolic function.    EKG obtained and showed new S1Q3T3 pattern as well as prominent T wave inversions in the inferior leads.  Basic labs and cardiac labs were obtained and a CT angio of the chest was ordered.  X-ray was also obtained.  Patient did end up having positive troponins.  Initial troponin was 6600 which down trended to 6100.  We did obtain an EKG repeat which showed no dynamic change and actually improvement in inferior T wave inversions (see above for formal interpretation).  Viral testing was negative.  BNP was slightly elevated.  Patient was given full dose aspirin and started on a heparin drip.  CT angio and chest x-ray showed noInfiltrates or evidence of PE.  Patient was started on heparin as well. cardiology was made aware the patient was admitted to medicine at this point for NSTEMI in stable condition.      This patient was discussed with Dr. Corado who agrees.      Micah Hobbs MD  Emergency Medicine, PGY3    Procedure  Procedures     Jean Hobbs MD  Resident  04/20/24 1974       Jean Hobbs MD  Resident  04/20/24 [id number]    "

## 2024-04-18 NOTE — Clinical Note
Angioplasty of the circumflex lesion. Inflation 1: Pressure = 12 kole; Duration = 20 sec. Inflation 2: Pressure = 12 kole; Duration = 25 sec.

## 2024-04-18 NOTE — Clinical Note
Catheter exchanged with CATHETER, GUIDING, LAUNCHER, 6FR, JL 3.5. Ear Star Wedge Flap Text: The defect edges were debeveled with a #15 blade scalpel.  Given the location of the defect and the proximity to free margins (helical rim) an ear star wedge flap was deemed most appropriate.  Using a sterile surgical marker, the appropriate flap was drawn incorporating the defect and placing the expected incisions between the helical rim and antihelix where possible.  The area thus outlined was incised through and through with a #15 scalpel blade.

## 2024-04-18 NOTE — Clinical Note
Angioplasty of the left anterior descending lesion. Inflation 1: Pressure = 12 kole; Duration = 10 sec.

## 2024-04-18 NOTE — PROGRESS NOTES
Pharmacy Medication History Review    Shabbir Laurent is a 73 y.o. male admitted for NSTEMI (non-ST elevated myocardial infarction) ([facility]). Pharmacy reviewed the patient's [id number] medications and allergies for accuracy.    The list below reflectives the updated PTA list. Please review each medication in order reconciliation for additional clarification and justification.  Prior to Admission medications    Medication Sig Start Date End Date Taking? Authorizing Provider   aspirin 81 mg EC tablet Take 1 tablet (81 mg) by mouth once daily in the morning. 10/22/19  Yes Historical Provider, MD   cetirizine (ZYRTEC) 10 mg capsule Take 1 capsule (10 mg) by mouth once daily in the morning.   Yes Historical Provider, MD   cholecalciferol (Vitamin D-3) 50 mcg (2,000 unit) capsule Take 1 capsule (50 mcg) by mouth early in the morning..   Yes Historical Provider, MD   FLUoxetine (PROzac) 20 mg capsule Take 1 capsule (20 mg) by mouth once daily.  Patient taking differently: Take 1 capsule (20 mg) by mouth once daily in the morning. 11/26/23 11/25/24 Yes Karl Mock DO   folic acid (Folvite) 1 mg tablet Take 1 tablet (1 mg) by mouth once daily.  Patient taking differently: Take 1 tablet (1 mg) by mouth once daily in the morning. 10/4/23 10/3/24 Yes Karl Mock DO   furosemide (Lasix) 20 mg tablet Take 1 tablet (20 mg) by mouth 4 times a week. Take every Mon, Wed, Fri, and Sat   Yes Historical Provider, MD   HYDROcodone-acetaminophen (Norco) 5-325 mg tablet Take 1 tablet by mouth 3 times a day as needed for moderate pain (4 - 6) for up to 28 days. Do not start before April 10, 2024.  Patient taking differently: Take 1 tablet by mouth 3 times a day as needed for moderate pain (4 - 6). Last [place] fill: 3/13/24 #84 for 28 days 4/10/24 5/8/24 Yes Alma Alfaro APRN-CNP   Jardiance 25 mg TAKE 1 TABLET BY MOUTH DAILY 5/15/23  Yes Karl Mock DO   krill-om3-dha-epa-om6-lip-astx (Krill Oil, Omega 3 and 6,)  1,500-165-67.5 mg capsule Take 1 capsule by mouth once daily.   Yes Historical Provider, MD   leflunomide (Arava) 20 mg tablet Take 1 tablet by mouth once daily 4/6/24  Yes Jean Polanco, DO   levothyroxine (Synthroid, Levoxyl) 137 mcg tablet Take 1 tablet (137 mcg) by mouth once daily. 10/4/23 10/3/24 Yes Karl Mock DO   losartan (Cozaar) 100 mg tablet Take 1 tablet (100 mg) by mouth once daily. 10/4/23 10/3/24 Yes Karl Mock DO   metFORMIN (Glucophage) 850 mg tablet Take 1 tablet (850 mg) by mouth 2 times a day with meals. 10/4/23  Yes Karl Mock DO   multivit-min/FA/lycopen/lutein (CENTRUM SILVER MEN ORAL) Take 1 tablet by mouth once daily.   Yes Historical Provider, MD   pregabalin (Lyrica) 200 mg capsule Take 1 capsule (200 mg) by mouth once daily at bedtime.  Patient taking differently: Take 1 capsule (200 mg) by mouth once daily at bedtime. Last [place]AR[place] fill: 1/23/24 #90 for 90 days 10/4/23 4/18/24 Yes Karl Mokc DO   rosuvastatin (Crestor) 20 mg tablet Take 1 tablet (20 mg) by mouth once daily. 1/17/24 1/16/25 Yes Karl Mock DO   warfarin (Coumadin) 5 mg tablet TAKE 1 AND 1/2 TABLETS BY MOUTH  5 TIMES WEEKLY AND 1 TABLET BY  MOUTH TWICE WEEKLY DAILY OR AS  DIRECTED AFTER INR TESTING. DO  NOT START BEFORE MARCH 11, 2023  Patient taking differently: Take 1 tablet (5 mg) by mouth 2 times a week. On Tuesday and Friday evenings 10/4/23  Yes Karl Mock DO   warfarin (Coumadin) 5 mg tablet Take 1.5 tablets (7.5 mg) by mouth 5 times a week. On Sunday, Monday, Wednesday, Thursday, Saturday evenings   Yes Historical Provider, MD   blood glucose control, normal solution Use as directed    Historical Provider, MD   enoxaparin (Lovenox) 120 mg/0.8 mL syringe Inject 0.8 mL (120 mg) under the skin every 12 hours.  Patient not taking: Reported on 3/18/2024 10/18/23   Karl Mock,    fluticasone propion-salmeteroL (Advair Diskus) 250-50 mcg/dose diskus inhaler Inhale 1 puff 2 times a day.  During summer (grass cutting) 10/17/23   Karl Mock, DO   HYDROcodone-acetaminophen (Norco) 5-325 mg tablet Take 1 tablet by mouth 3 times a day as needed for moderate pain (4 - 6) for up to 28 days. Do not start before May 8, 2024. 5/8/24 6/5/24  Alma A Frisina, APRN-CNP   HYDROcodone-acetaminophen (Norco) 5-325 mg tablet Take 1 tablet by mouth 3 times a day as needed for moderate pain (4 - 6) for up to 28 days. Do not start before June 5, 2024. 6/5/24 7/3/24  Alma YULI Mosquedaa, APRN-CNP   lancets 33 gauge misc 1 each by percutaneous route once daily. Test BS daily 11/26/23 11/25/24  Karl Mock DO   lubricating eye drops ophthalmic solution Administer 1 drop into both eyes if needed for dry eyes.    Historical Provider, MD   nitroglycerin (Nitrostat) 0.4 mg SL tablet Place 1 tablet (0.4 mg) under the tongue every 5 minutes if needed for chest pain. 5/13/21   Historical Provider, MD   OneTouch Ultra Test strip Test blood glucose once daily 11/26/23   Karl Mock DO   psyllium (Metamucil) 0.4 gram capsule Take 1 capsule by mouth in the morning and 1 capsule before bedtime. 10/2/14 4/18/24  Historical Provider, MD        The list below reflectives the updated allergy list. Please review each documented allergy for additional clarification and justification.  Allergies  Reviewed by Nj Melo RN on 4/18/2024        Severity Reactions Comments    Hydrochlorothiazide Medium Other BP drops too low and passes out    Phenylpropanolamine Medium Hives Patient states he doesn't know what this is    Amiloride-hydrochlorothiazide Low Unknown     Ceramide 3b Low Unknown Patient states he doesn't know what this is    Chlorpheniramine-phenylpropan Low Unknown Patient states he doesn't know what this is            Below are additional concerns with the patient's PTA list.      Shelley Sepulveda

## 2024-04-18 NOTE — Clinical Note
Angioplasty of the circumflex lesion. Inflation 1: Pressure = 12 kole; Duration = 20 sec. Inflation 2: Pressure = 12 kole; Duration = 10 sec.

## 2024-04-18 NOTE — H&P
Patient: Shabbir Laurent Age: 73 y.o.   Gender: male Room/bed: 241/241-A     Attending: No att. providers found  Code Status:  Full Code    Chief Complaint   Patient: Shabbir Laurent is a 73 y.o. male with PMH of  mechanical aortic valve replacement on warfarin, open heart surgery, CHF, HTN, HLD, AUBREY (uses CPAP), and T2DM, who presented to the hospital for diaphoresis and generalized weakness 2/2 NSTEMI.    HPI   Patient states that his symptoms started on Monday, 3 days ago.  Stated that his blood pressure which is normally well managed was 195/98.  States he was feeling generally unwell and weak but became diaphoretic this morning.  Was urged to come to the ED by his wife due to his progressing symptoms.  Denies chest pain and shortness of breath. Denies history of myocardial infarctions including stent placements. Did have valve surgery prior. . Patient attempted no treatment for his symptoms before coming to the ED.  Patient's cardiologist is Dr. Goins in [place].    In the ED the patient was found to have  elevated troponin above 6000 which is down trending.  Patient was also hyponatremic with elevated TSH. FLU/COVID, CBC, and magnesium where within normal limits. EKG of patient demonstrated ST depression in anterior leads with ST inversion in the inferior leads. Patient underwent a CT angio which showed no evidence of PE.  CXR showed no acute process. INR was 2.7. Patient given 1L of LR and 324 mg of Asprin. Heparin drip was started    Currently the patient is in no acute distress feeling generally weak but having no other symptoms. Negative for headache, acute vision changes, heart palpitations, cough, abdominal pain, nausea/vomiting, constipation, diarrhea, or dysuria.      A 12 point ROS was performed and is negative unless otherwise stated in the ROS      ED Course   Vitals - /79   Pulse 77   Temp 36.6 °C (97.9 °F) (Temporal)   Resp 18   Wt 125 kg (275 lb)   SpO2 98%     Labs:   Results from last  "72 hours   Lab Units 04/18/24  1203   SODIUM mmol/L 134*   POTASSIUM mmol/L 4.7   CHLORIDE mmol/L 101   CO2 mmol/L 25   BUN mg/dL 20   CREATININE mg/dL 1.16   GLUCOSE mg/dL 154*   CALCIUM mg/dL 9.4   ANION GAP mmol/L 13   EGFR mL/min/1.73m*2 67      Results from last 72 hours   Lab Units 04/18/24  1203   WBC AUTO x10*3/uL 9.3   HEMOGLOBIN g/dL 15.6   HEMATOCRIT % 47.6   PLATELETS AUTO x10*3/uL 267   NEUTROS PCT AUTO % 68.2   LYMPHS PCT AUTO % 17.2   MONOS PCT AUTO % 12.5   EOS PCT AUTO % 1.3      Lab Results   Component Value Date    CALCIUM 9.4 04/18/2024    PHOS 3.1 01/06/2021      Lab Results   Component Value Date    CRP 0.54 03/18/2024      [unfilled]     Micro/ID:   No results found for the last 90 days.                   No lab exists for component: \"AGALPCRNB\"   .ID  No results found for: \"URINECULTURE\", \"BLOODCULT\", \"CSFCULTSMEAR\"    Imaging:       Transthoracic Echo (TTE) Complete    Result Date: 11/15/2023    [facility], [address] [place] [id number] [phone] and                                 Fax [phone] TRANSTHORACIC ECHOCARDIOGRAM REPORT  Patient Name:      GIORGIO Han Physician:    [id number] Quintana MD, [facility] Study Date:        11/10/2023           Ordering Provider:    [id number] BIMAL QUINTANA MRN/PID:           [id number]             Fellow: Accession#:        [id number]         Nurse: Date of Birth/Age: 1950 / 73 years Sonographer:          [facility]                                                               BS, RVT, RDCS Gender:            M                    Additional Staff: Height:            182.88 cm            Admit Date:           11/10/2023 Weight:            127.91 kg            Admission Status:     Outpatient BSA:               2.47 m2              Encounter#:           [id number]                         "                 Department Location:  [facility] Blood Pressure: 152 /88 mmHg Study Type:    TRANSTHORACIC ECHO (TTE) COMPLETE Diagnosis/ICD: Presence of other cardiac implants and grafts-Z95.818 Indication:    Hypertension, Dyspnea, Congestive Heart Failure CPT Code:      Echo Complete w Full Doppler-[id number] Patient History: Valve Disorders:   Aortic Valve Replacement and Mitral Regurgitation. Diabetes:          Yes BMI:               Obese >30 Pertinent History: HTN, Hyperlipidemia, CAD, Dyspnea, PVD and CHF. Study Detail: The following Echo studies were performed: 2D, M-Mode, Doppler and               color flow. Technically challenging study due to body habitus and               prominent lung artifact. The patient was awake.  PHYSICIAN INTERPRETATION: Left Ventricle: The left ventricular systolic function is normal, with an estimated ejection fraction of 60-65%. There are no regional wall motion abnormalities. The left ventricular cavity size is normal. There is moderate left ventricular hypertrophy. Abnormal (paradoxical) septal motion, consistent with left bundle branch block. Spectral Doppler shows an impaired relaxation pattern of left ventricular diastolic filling. Left Atrium: The left atrium is moderately dilated. Right Ventricle: The right ventricle is normal in size. There is mildly reduced right ventricular systolic function. Right Atrium: The right atrium is moderately dilated. Aortic Valve: There is a prosthetic aortic valve present. There is mild aortic valve regurgitation. The peak instantaneous gradient of the aortic valve is 29.3 mmHg. The mean gradient of the aortic valve is 15.7 mmHg. Mitral Valve: The mitral valve is normal in structure. There is trace mitral valve regurgitation. Tricuspid Valve: The tricuspid valve is structurally normal. No evidence of tricuspid regurgitation. Pulmonic Valve: The pulmonic valve is not well visualized. The pulmonic valve regurgitation was not well  visualized. Pericardium: There is no pericardial effusion noted. Aorta: The aortic root is abnormal.  CONCLUSIONS:  1. Left ventricular systolic function is normal with a 60-65% estimated ejection fraction.  2. Abnormal septal motion consistent with left bundle branch block.  3. Spectral Doppler shows an impaired relaxation pattern of left ventricular diastolic filling.  4. There is moderate left ventricular hypertrophy.  5. There is mildly reduced right ventricular systolic function.  6. The left atrium is moderately dilated.  7. The right atrium is moderately dilated.  8. Mild aortic valve regurgitation.  9. Normally functioning mechanical aortic valve with normal hemodynamics. 10. Prosthetic graft in the aortic position measuring 4.5 cm. QUANTITATIVE DATA SUMMARY: 2D MEASUREMENTS:                           Normal Ranges: LAs:           4.30 cm    (2.7-4.0cm) IVSd:          1.64 cm    (0.6-1.1cm) LVPWd:         1.56 cm    (0.6-1.1cm) LVIDd:         4.65 cm    (3.9-5.9cm) LVIDs:         3.29 cm LV Mass Index: 129.4 g/m2 LV % FS        29.3 % LA VOLUME:                               Normal Ranges: LA Vol A4C:        107.9 ml   (22+/-6mL/m2) LA Vol A2C:        97.4 ml LA Vol BP:         104.1 ml LA Vol Index A4C:  43.8ml/m2 LA Vol Index A2C:  39.5 ml/m2 LA Vol Index BP:   42.2 ml/m2 LA Area A4C:       28.5 cm2 LA Area A2C:       27.5 cm2 LA Major Axis A4C: 6.4 cm LA Major Axis A2C: 6.6 cm LA Vol A4C:        97.5 ml LA Vol A2C:        92.5 ml RA VOLUME BY A/L METHOD:                               Normal Ranges: RA Vol A4C:        103.2 ml   (8.3-19.5ml) RA Vol Index A4C:  41.8 ml/m2 RA Area A4C:       25.6 cm2 RA Major Axis A4C: 5.4 cm M-MODE MEASUREMENTS:                           Normal Ranges: Ao Root:       4.80 cm    (2.0-3.7cm) AoV Exc:       2.52 cm    (1.5-2.5cm) LAs:           5.34 cm    (2.7-4.0cm) IVSd:          1.91 cm    (0.6-1.1cm) LVPWd:         2.00 cm    (0.6-1.1cm) LVIDd:         5.86 cm     (3.9-5.9cm) LVIDs:         4.16 cm LV Mass Index: 246.3 g/m2 LV % FS        28.9 % AORTA MEASUREMENTS:                      Normal Ranges: AoV Exc:     2.52 cm (1.5-2.5cm) AoV Chani,s:   2.30 cm (1.4-2.6cm) Ao Sinus, d: 4.40 cm (2.1-3.5cm) Ao STJ, d:   3.30 cm (1.7-3.4cm) Asc Ao, d:   4.50 cm (2.1-3.4cm) Ao Arch:     3.10 cm (2.0-3.6cm) LV SYSTOLIC FUNCTION BY 2D PLANIMETRY (MOD):                     Normal Ranges: EF-A4C View: 52.8 % (>=55%) EF-A2C View: 61.9 % EF-Biplane:  65.0 % LV DIASTOLIC FUNCTION:                           Normal Ranges: MV Peak E:    0.54 m/s    (0.7-1.2 m/s) MV Peak A:    0.68 m/s    (0.42-0.7 m/s) E/A Ratio:    0.79        (1.0-2.2) MV lateral e' 0.06 m/s MV medial e'  0.05 m/s MV A Dur:     152.25 msec MITRAL VALVE:                 Normal Ranges: MV DT: 261 msec (150-240msec) AORTIC VALVE:                                    Normal Ranges: AoV Vmax:                2.71 m/s  (<=1.7m/s) AoV Peak P.3 mmHg (<20mmHg) AoV Mean PG:             15.7 mmHg (1.7-11.5mmHg) LVOT Max Moises:            0.71 m/s  (<=1.1m/s) AoV VTI:                 53.71 cm  (18-25cm) LVOT VTI:                16.97 cm LVOT Diameter:           2.28 cm   (1.8-2.4cm) AoV Area, VTI:           1.29 cm2  (2.5-5.5cm2) AoV Area,Vmax:           1.08 cm2  (2.5-4.5cm2) AoV Dimensionless Index: 0.32 AORTIC INSUFFICIENCY: AI Vmax:       3.41 m/s AI Half-time:  421 msec AI Decel Time: 1452 msec AI Decel Rate: 234.65 cm/s2  RIGHT VENTRICLE: RV Basal 4.40 cm RV Mid   3.00 cm RV Major 7.9 cm TAPSE:   18.0 mm RV s'    0.01 m/s TRICUSPID VALVE/RVSP:                   Normal Ranges: IVC Diam: 2.70 cm AORTA: Asc Ao Diam 4.48 cm  [id number] Jose Juan Goins MD, FACC Electronically signed on 11/15/2023 at 8:37:35 AM  ** Final **    ECG 12 lead    Result Date: 2024   Suspect arm lead reversal, interpretation assumes no reversal Sinus rhythm with Premature atrial complexes Rightward axis Pulmonary disease pattern ST & T wave abnormality,  consider inferior ischemia Abnormal ECG When compared with ECG of 29-APR-2014 10:29, Premature atrial complexes are now Present Vent. rate has increased BY  36 BPM ST now depressed in Anterior leads T wave inversion now evident in Inferior leads See ED provider note for full interpretation and clinical correlation Confirmed by Rylee Wisdom ([id number]) on 4/18/2024 5:00:29 PM    CT angio chest for pulmonary embolism    Result Date: 4/18/2024  Interpreted By:  Alda Simon, STUDY: CT ANGIO CHEST FOR PULMONARY EMBOLISM;  4/18/2024 2:31 pm   INDICATION: Signs/Symptoms:Shortness of breath, new S1Q3T3 pattern.   COMPARISON: Chest radiograph 04/18/2024, chest CT 10/17/2018   ACCESSION NUMBER(S): [id number]   ORDERING CLINICIAN: AGUSTÍN JOSE   TECHNIQUE: Helical data acquisition of the chest was obtained 78 mL Omnipaque 350. Images were reformatted in axial, coronal, and sagittal planes. 3D MIP images were generated and reviewed.   FINDINGS: LUNGS and AIRWAYS: There is no infiltrate or pleural fluid.  There is prominence of the interstitial markings, chronic   There is no pulmonary nodule.   CLEMENTINA AND MEDIASTINUM: There is no hilar or mediastinal adenopathy.     HEART and VESSELS: There is no thoracic aortic aneurysm or dissection.   There is good opacification of the pulmonary arterial system with no embolism.   There is atherosclerotic calcification of coronary arteries. The study is not optimized for evaluation of coronary arteries.   The cardiac chambers are not enlarged. There are sternotomy wires from prior cardiac surgery. There is a prosthetic aortic valve.   No evidence of pericardial effusion.   UPPER ABDOMEN: The visualized subdiaphragmatic structures demonstrate no remarkable findings.     CHEST WALL and OSSEOUS STRUCTURES: There are no suspicious osseous lesions. Multilevel degenerative changes are present       1. No pulmonary embolism. 2. Chronic interstitial changes with no acute process.   MACRO:  None   Signed by: Alda Simon 4/18/2024 2:45 PM Dictation workstation:   [id number]    XR chest 1 view    Result Date: 4/18/2024  Interpreted By:  Alda Simon, STUDY: XR CHEST 1 VIEW;  4/18/2024 12:51 pm   INDICATION: Signs/Symptoms:chest discomfort yesterday, hypotension.   COMPARISON: 06/23/2008   ACCESSION NUMBER(S): [id number]   ORDERING CLINICIAN: AGUSTÍN JOSE   FINDINGS: CARDIOMEDIASTINAL SILHOUETTE: Cardiomediastinal silhouette is normal in size and configuration. There are sternotomy wires from prior cardiac surgery.   LUNGS: Lungs are clear.   ABDOMEN: No remarkable upper abdominal findings.     BONES: No acute osseous changes.       No acute cardiopulmonary process.   MACRO: None   Signed by: Alda Simon 4/18/2024 12:56 PM Dictation workstation:   [id number]      Interventions:  Medications   heparin 25,000 Units in dextrose 5% 250 mL (100 Units/mL) infusion (premix) (1,000 Units/hr intravenous New Bag 4/18/24 1626)   heparin bolus from bag 2,000-4,000 Units (has no administration in time range)   acetaminophen (Tylenol) tablet 650 mg (has no administration in time range)   polyethylene glycol (Glycolax, Miralax) packet 17 g (has no administration in time range)   melatonin tablet 3 mg (has no administration in time range)   ticagrelor (Brilinta) tablet 180 mg (has no administration in time range)     Followed by   ticagrelor (Brilinta) tablet 90 mg (has no administration in time range)   aspirin EC tablet 81 mg (has no administration in time range)   atorvastatin (Lipitor) tablet 40 mg (has no administration in time range)   aspirin chewable tablet 324 mg (324 mg oral Given 4/18/24 1321)   lactated Ringer's bolus 1,000 mL (0 mL intravenous Stopped 4/18/24 1443)   iohexol (OMNIPaque) 350 mg iodine/mL solution 78 mL (78 mL intravenous Given 4/18/24 1430)   heparin bolus from bag 4,000 Units (4,000 Units intravenous Bolus from Bag 4/18/24 1625)     Past Medical History     Past Medical  History:   Diagnosis Date    Abrasion, right lower leg, initial encounter 09/26/2019    Leg abrasion, right, initial encounter    Body mass index (BMI) 37.0-37.9, adult 02/28/2020    BMI 37.0-37.9, adult    Corns and callosities 03/22/2019    Callus of foot    Diverticulosis of intestine, part unspecified, without perforation or abscess without bleeding     Diverticulosis    Other conditions influencing health status 08/30/2016    Bleeding from varicose vein, right    Other forms of angina pectoris (CMS-HCC)     Chronic stable angina    Other hyperlipidemia     Other hyperlipidemia    Other postherpetic nervous system involvement 10/07/2015    Herpes zoster virus infection of face and ear nerves    Pain in right ankle and joints of right foot 01/15/2020    Acute right ankle pain    Personal history of colonic polyps     History of colonic polyps    Personal history of diseases of the skin and subcutaneous tissue 09/29/2016    History of folliculitis    Personal history of other (healed) physical injury and trauma 08/11/2014    History of sprain of elbow    Personal history of other diseases of the circulatory system     History of aortic valve stenosis    Personal history of other diseases of the musculoskeletal system and connective tissue     History of fibromyalgia    Personal history of other diseases of the musculoskeletal system and connective tissue 07/06/2013    History of low back pain    Personal history of other diseases of the nervous system and sense organs 10/28/2013    History of subconjunctival hemorrhage    Personal history of other diseases of the respiratory system 03/04/2019    History of acute bronchitis    Personal history of other endocrine, nutritional and metabolic disease     History of obesity    Personal history of other endocrine, nutritional and metabolic disease 05/02/2019    History of type 2 diabetes mellitus    Personal history of other endocrine, nutritional and metabolic disease  05/10/2018    History of hyperglycemia    Personal history of other mental and behavioral disorders 04/07/2020    History of depression    Personal history of other specified conditions     History of shortness of breath    Personal history of other specified conditions     History of palpitations    Prediabetes 10/04/2018    Pre-diabetes    Presence of aortocoronary bypass graft     S/P CABG x 1    Presence of prosthetic heart valve     Status post mechanical aortic valve replacement    Rash and other nonspecific skin eruption 01/10/2017    Rash, skin    Spondylosis without myelopathy or radiculopathy, lumbar region 07/06/2013    Lumbar spondylosis    Strain of unspecified muscles, fascia and tendons at thigh level, unspecified thigh, initial encounter 12/05/2013    Muscle strain of thigh    Unspecified asthma, uncomplicated ([facility]) 03/18/2019    Asthmatic bronchitis    Unspecified fall, initial encounter 09/26/2019    Fall at home, initial encounter        Surgical History     Past Surgical History:   Procedure Laterality Date    AORTIC VALVE REPLACEMENT  06/25/2014    Aortic Valve Replacement    CARDIAC CATHETERIZATION  04/23/2018    Cardiac Cath Procedure Summary    COLONOSCOPY  05/16/2013    Complete Colonoscopy    COLONOSCOPY  09/21/2017    Colonoscopy (Fiberoptic)    COLONOSCOPY  07/25/2014    Colonoscopy (Fiberoptic)    CORONARY ARTERY BYPASS GRAFT  04/23/2018    CABG    GALLBLADDER SURGERY  10/22/2013    Gallbladder Surgery    KNEE ARTHROSCOPY W/ DEBRIDEMENT  10/22/2013    Arthroscopy Knee Left    OTHER SURGICAL HISTORY  04/23/2018    History Of Prior Surgery    OTHER SURGICAL HISTORY  10/02/2014    Laser Suite Retina Laser Treatment    OTHER SURGICAL HISTORY  08/03/2022    Uvulopalatopharyngoplasty    OTHER SURGICAL HISTORY  08/03/2022    Tonsillectomy with adenoidectomy    OTHER SURGICAL HISTORY  10/28/2013    Heart Valve Replacement       Family History     Family History   Problem Relation Name Age of  Onset    Arthritis Mother      Other (Stroke Syndrome) Mother      Arthritis Father      Other (CABG) Father      Sleep apnea Son         Social History     Social History     Socioeconomic History    Marital status:      Spouse name: Not on file    Number of children: Not on file    Years of education: Not on file    Highest education level: Not on file   Occupational History    Not on file   Tobacco Use    Smoking status: Former     Types: Cigarettes    Smokeless tobacco: Never   Substance and Sexual Activity    Alcohol use: Never    Drug use: Never    Sexual activity: Not on file   Other Topics Concern    Not on file   Social History Narrative    Not on file     Social Determinants of Health     Financial Resource Strain: Not on file   Food Insecurity: Not on file   Transportation Needs: Not on file   Physical Activity: Not on file   Stress: Not on file   Social Connections: Not on file   Intimate Partner Violence: Not on file   Housing Stability: Not on file       Tobacco Use: Medium Risk (4/18/2024)    Patient History     Smoking Tobacco Use: Former     Smokeless Tobacco Use: Never     Passive Exposure: Not on file        Social History     Substance and Sexual Activity   Alcohol Use Never        Allergies     Allergies   Allergen Reactions    Hydrochlorothiazide Other     BP drops too low and passes out    Phenylpropanolamine Hives     Patient states he doesn't know what this is    Amiloride-Hydrochlorothiazide Unknown    Ceramide 3b Unknown     Patient states he doesn't know what this is    Chlorpheniramine-Phenylpropan Unknown     Patient states he doesn't know what this is        Objective    Physical Exam   Constitutional: Appears stated age. In NAD.   HEENT: NC/AT, EOMI, clear sclera, moist mucous membranes  CV: RRR, Systolic click  PULM: CTAB, no coughing or wheezing  ABDOMEN: Soft, NT/ND. No TTP. + BSx4.  SKIN: Normal Color, Warm, Dry, No Rashes   EXTREMITIES: Non-Tender, Full ROM, No Pedal  Edema  NEURO: A&O x 4, nonfocal neurological exam.  PSYCH: Normal Mood & Behavior      Visit Vitals  /79   Pulse 77   Temp 36.6 °C (97.9 °F) (Temporal)   Resp 18   Ht 1.829 m (6')   Wt 125 kg (275 lb)   SpO2 98%   BMI 37.30 kg/m²   Smoking Status Former   BSA 2.52 m²          Intake/Output Summary (Last 24 hours) at 4/18/2024 1820  Last data filed at 4/18/2024 1443  Gross per 24 hour   Intake 1000 ml   Output --   Net 1000 ml             Meds    Scheduled medications  [START ON 4/19/2024] aspirin, 81 mg, oral, Daily  atorvastatin, 40 mg, oral, Nightly  polyethylene glycol, 17 g, oral, Daily  ticagrelor, 180 mg, oral, Once   Followed by  [START ON 4/19/2024] ticagrelor, 90 mg, oral, BID        Continuous medications  heparin, 0-4,000 Units/hr, Last Rate: 1,000 Units/hr (04/18/24 1626)        PRN medications  PRN medications: acetaminophen, heparin, melatonin     Assessment and Plan    Shabbir Laurent is a 73 y.o. male with PMH of  mechanical aortic valve on warfarin, open heart surgery, CHF, HTN, HLD, AUBREY (uses CPAP), [id number], who presented to the hospital for diaphoresis and generalized weakness 2/2 NSTEMI. Heparin drip. Cardiology consulted. Mabel to perform heart cath tomorrow.    Acute medical issues  #NSTEMI  - Asprin given in the ED and started on Heparin drip, continue  -Brilinta loading dose given  -EKG demonstrated ST depression in anterior leads and ST inversionis in the inferior leads.  -CT angio demonstrated no PE  -BNP elevated at 181, euvolemic  -Cardiology constuled  -Discussed with Dr. Monroe. Cath tomorrow by Dr. Monroe  -Echo after cath tomorrow, last echo in November    Chronic medical issues    #CHF  - Lasix 20 mg MWF  -Jardiance 25 mg    #HTN  -Losartan 100 mg    #HLD  -Started on Atorvastatin 40 mg    #Type 2 DM  - Metformin 850 mg bid    #Anxiety  - Prozac 20 mg    #Nutrition  -Folic acid 1 mg    #Chronic LBP  # seronegative inflammatory arthritis  -Leflunomide 20 mg  -Tylenol prn first  line  -Norco 5-325 tid prn  -Pregablin 200 mg    #Hypothyroidism  -Synthroid 137 mg    #Nasal congestion  -Loratadine 10 mg    Fluids: IVF  Electrolytes: Replete as needed   Nutrition: NPO  GI Ppx:  DVT Ppx: Heparin ggt  Oxygenation: RA  Antibiotics: None    Dispo: Shabbir Laurent is a 73 y.o. male who presented with NSTEMI. Heparin drip. Cardiology consulted. Mabel to perform heart cath tomorrow. ELOS >48hrs    Kashif Ellis DO

## 2024-04-18 NOTE — Clinical Note
Angioplasty of the circumflex lesion. Inflation 1: Pressure = 12 kole; Duration = 28 sec. Inflation 2: Pressure = 12 kole; Duration = 30 sec. Inflation 3: Pressure = 20 kole; Duration = 28 sec. kissing

## 2024-04-18 NOTE — ED TRIAGE NOTES
Patient states he has not been feeling well since Monday, today he got out of the shower and was very diaphoretic and weak, he checked his BP and it was 98/58 and his heart rate was elevated at 110. Patient did take his BP meds this AM.

## 2024-04-19 LAB
ALBUMIN SERPL BCP-MCNC: 4.1 G/DL (ref 3.4–5)
ALP SERPL-CCNC: 87 U/L (ref 33–136)
ALT SERPL W P-5'-P-CCNC: 30 U/L (ref 10–52)
ANION GAP SERPL CALC-SCNC: 17 MMOL/L (ref 10–20)
AST SERPL W P-5'-P-CCNC: 47 U/L (ref 9–39)
BILIRUB SERPL-MCNC: 0.9 MG/DL (ref 0–1.2)
BUN SERPL-MCNC: 19 MG/DL (ref 6–23)
CALCIUM SERPL-MCNC: 9.4 MG/DL (ref 8.6–10.3)
CHLORIDE SERPL-SCNC: 102 MMOL/L (ref 98–107)
CO2 SERPL-SCNC: 23 MMOL/L (ref 21–32)
CREAT SERPL-MCNC: 1.07 MG/DL (ref 0.5–1.3)
EGFRCR SERPLBLD CKD-EPI 2021: 73 ML/MIN/1.73M*2
GLUCOSE BLD MANUAL STRIP-MCNC: 127 MG/DL (ref 74–99)
GLUCOSE BLD MANUAL STRIP-MCNC: 170 MG/DL (ref 74–99)
GLUCOSE BLD MANUAL STRIP-MCNC: 176 MG/DL (ref 74–99)
GLUCOSE SERPL-MCNC: 137 MG/DL (ref 74–99)
HOLD SPECIMEN: NORMAL
INR PPP: 2.4 (ref 0.9–1.1)
POTASSIUM SERPL-SCNC: 3.8 MMOL/L (ref 3.5–5.3)
PROT SERPL-MCNC: 7.5 G/DL (ref 6.4–8.2)
PROTHROMBIN TIME: 27.6 SECONDS (ref 9.8–12.8)
SODIUM SERPL-SCNC: 138 MMOL/L (ref 136–145)
UFH PPP CHRO-ACNC: 0.2 IU/ML
UFH PPP CHRO-ACNC: 0.2 IU/ML
UFH PPP CHRO-ACNC: 0.3 IU/ML
UFH PPP CHRO-ACNC: 0.4 IU/ML

## 2024-04-19 PROCEDURE — 36415 COLL VENOUS BLD VENIPUNCTURE: CPT | Performed by: INTERNAL MEDICINE

## 2024-04-19 PROCEDURE — 1200000002 HC GENERAL ROOM WITH TELEMETRY DAILY

## 2024-04-19 PROCEDURE — 2500000001 HC RX 250 WO HCPCS SELF ADMINISTERED DRUGS (ALT 637 FOR MEDICARE OP)

## 2024-04-19 PROCEDURE — 85520 HEPARIN ASSAY: CPT | Performed by: INTERNAL MEDICINE

## 2024-04-19 PROCEDURE — 2500000006 HC RX 250 W HCPCS SELF ADMINISTERED DRUGS (ALT 637 FOR ALL PAYERS)

## 2024-04-19 PROCEDURE — 82947 ASSAY GLUCOSE BLOOD QUANT: CPT

## 2024-04-19 PROCEDURE — 85610 PROTHROMBIN TIME: CPT | Performed by: INTERNAL MEDICINE

## 2024-04-19 PROCEDURE — 80053 COMPREHEN METABOLIC PANEL: CPT

## 2024-04-19 PROCEDURE — 2500000004 HC RX 250 GENERAL PHARMACY W/ HCPCS (ALT 636 FOR OP/ED)

## 2024-04-19 PROCEDURE — 99222 1ST HOSP IP/OBS MODERATE 55: CPT

## 2024-04-19 PROCEDURE — 94660 CPAP INITIATION&MGMT: CPT

## 2024-04-19 PROCEDURE — 2500000002 HC RX 250 W HCPCS SELF ADMINISTERED DRUGS (ALT 637 FOR MEDICARE OP, ALT 636 FOR OP/ED)

## 2024-04-19 PROCEDURE — 85520 HEPARIN ASSAY: CPT | Mod: MUE

## 2024-04-19 RX ORDER — DEXTROSE 50 % IN WATER (D50W) INTRAVENOUS SYRINGE
12.5
Status: DISCONTINUED | OUTPATIENT
Start: 2024-04-19 | End: 2024-04-23 | Stop reason: HOSPADM

## 2024-04-19 RX ORDER — INSULIN LISPRO 100 [IU]/ML
0-10 INJECTION, SOLUTION INTRAVENOUS; SUBCUTANEOUS EVERY 4 HOURS
Status: DISCONTINUED | OUTPATIENT
Start: 2024-04-19 | End: 2024-04-21

## 2024-04-19 RX ORDER — DEXTROSE 50 % IN WATER (D50W) INTRAVENOUS SYRINGE
25
Status: DISCONTINUED | OUTPATIENT
Start: 2024-04-19 | End: 2024-04-23 | Stop reason: HOSPADM

## 2024-04-19 RX ORDER — WARFARIN SODIUM 5 MG/1
5 TABLET ORAL
Status: DISCONTINUED | OUTPATIENT
Start: 2024-04-19 | End: 2024-04-23 | Stop reason: HOSPADM

## 2024-04-19 RX ORDER — LOPERAMIDE HYDROCHLORIDE 2 MG/1
2 CAPSULE ORAL 3 TIMES DAILY PRN
Status: DISCONTINUED | OUTPATIENT
Start: 2024-04-19 | End: 2024-04-23 | Stop reason: HOSPADM

## 2024-04-19 RX ORDER — CHOLECALCIFEROL (VITAMIN D3) 25 MCG
2000 TABLET ORAL DAILY
Status: DISCONTINUED | OUTPATIENT
Start: 2024-04-19 | End: 2024-04-23 | Stop reason: HOSPADM

## 2024-04-19 RX ORDER — WARFARIN SODIUM 5 MG/1
7.5 TABLET ORAL
Status: DISCONTINUED | OUTPATIENT
Start: 2024-04-20 | End: 2024-04-23 | Stop reason: HOSPADM

## 2024-04-19 RX ADMIN — EMPAGLIFLOZIN 25 MG: 10 TABLET, FILM COATED ORAL at 09:51

## 2024-04-19 RX ADMIN — ATORVASTATIN CALCIUM 40 MG: 40 TABLET, FILM COATED ORAL at 20:54

## 2024-04-19 RX ADMIN — LOSARTAN POTASSIUM 100 MG: 50 TABLET, FILM COATED ORAL at 09:50

## 2024-04-19 RX ADMIN — LORATADINE 10 MG: 10 TABLET ORAL at 09:50

## 2024-04-19 RX ADMIN — Medication 2000 UNITS: at 09:51

## 2024-04-19 RX ADMIN — HEPARIN SODIUM 1600 UNITS/HR: 10000 INJECTION, SOLUTION INTRAVENOUS at 21:55

## 2024-04-19 RX ADMIN — FLUOXETINE HYDROCHLORIDE 20 MG: 20 CAPSULE ORAL at 09:50

## 2024-04-19 RX ADMIN — METOPROLOL TARTRATE 25 MG: 25 TABLET, FILM COATED ORAL at 09:51

## 2024-04-19 RX ADMIN — FOLIC ACID 1 MG: 1 TABLET ORAL at 09:50

## 2024-04-19 RX ADMIN — INSULIN LISPRO 2 UNITS: 100 INJECTION, SOLUTION INTRAVENOUS; SUBCUTANEOUS at 20:56

## 2024-04-19 RX ADMIN — FUROSEMIDE 20 MG: 40 TABLET ORAL at 09:50

## 2024-04-19 RX ADMIN — LEVOTHYROXINE SODIUM 137 MCG: 137 TABLET ORAL at 09:51

## 2024-04-19 RX ADMIN — PREGABALIN 200 MG: 75 CAPSULE ORAL at 20:54

## 2024-04-19 RX ADMIN — LOPERAMIDE HYDROCHLORIDE 2 MG: 2 CAPSULE ORAL at 18:57

## 2024-04-19 RX ADMIN — METOPROLOL TARTRATE 25 MG: 25 TABLET, FILM COATED ORAL at 20:54

## 2024-04-19 RX ADMIN — HEPARIN SODIUM 14 UNITS/HR: 10000 INJECTION, SOLUTION INTRAVENOUS at 07:12

## 2024-04-19 RX ADMIN — ASPIRIN 81 MG: 81 TABLET, COATED ORAL at 09:51

## 2024-04-19 RX ADMIN — LEFLUNOMIDE 20 MG: 10 TABLET, FILM COATED ORAL at 09:50

## 2024-04-19 RX ADMIN — TICAGRELOR 90 MG: 90 TABLET ORAL at 09:50

## 2024-04-19 RX ADMIN — TICAGRELOR 90 MG: 90 TABLET ORAL at 20:54

## 2024-04-19 ASSESSMENT — COGNITIVE AND FUNCTIONAL STATUS - GENERAL
MOBILITY SCORE: 24
DAILY ACTIVITIY SCORE: 24

## 2024-04-19 ASSESSMENT — ACTIVITIES OF DAILY LIVING (ADL)
LACK_OF_TRANSPORTATION: NO
LACK_OF_TRANSPORTATION: NO

## 2024-04-19 ASSESSMENT — PAIN - FUNCTIONAL ASSESSMENT: PAIN_FUNCTIONAL_ASSESSMENT: 0-10

## 2024-04-19 ASSESSMENT — PAIN SCALES - GENERAL
PAINLEVEL_OUTOF10: 0 - NO PAIN
PAINLEVEL_OUTOF10: 0 - NO PAIN

## 2024-04-19 NOTE — PROGRESS NOTES
04/19/24 1404   Discharge Planning   Living Arrangements Spouse/significant other   Support Systems Spouse/significant other   Assistance Needed Patient does not speak English, information obtained from family at bedside for baseline: Patient is A&OX3, lives at home alone, independent in ADLs, ambulates without assistive devices, drives, No O2 baseline but has CPAP   Type of Residence Private residence   Number of Stairs to Enter Residence 3   Number of Stairs Within Residence 1  (1 flight upstairs and 1 flight to basement)   Do you have animals or pets at home? No   Who is requesting discharge planning? Provider   Home or Post Acute Services None   Patient expects to be discharged to: Home, no needs   Does the patient need discharge transport arranged? No   Financial Resource Strain   How hard is it for you to pay for the very basics like food, housing, medical care, and heating? Not hard   Housing Stability   In the last 12 months, was there a time when you were not able to pay the mortgage or rent on time? N   In the last 12 months, how many places have you lived? 1   In the last 12 months, was there a time when you did not have a steady place to sleep or slept in a shelter (including now)? N   Transportation Needs   In the past 12 months, has lack of transportation kept you from medical appointments or from getting medications? no   In the past 12 months, has lack of transportation kept you from meetings, work, or from getting things needed for daily living? No

## 2024-04-19 NOTE — PROGRESS NOTES
04/19/24 1411   Discharge Planning   Living Arrangements Spouse/significant other;Children  (lives with spouse and daughter)   Support Systems Spouse/significant other;Children   Assistance Needed X3, independent in ADLs, ambulates without assistive devices, drives, No O2 but has CPAP at home   Type of Residence Private residence   Number of Stairs to Enter Residence 3   Number of Stairs Within Residence 1   Do you have animals or pets at home? No   Who is requesting discharge planning? Provider   Home or Post Acute Services None   Patient expects to be discharged to: Home, no needs-denied need for HHC   Does the patient need discharge transport arranged? No   Financial Resource Strain   How hard is it for you to pay for the very basics like food, housing, medical care, and heating? Not hard   Housing Stability   In the last 12 months, was there a time when you were not able to pay the mortgage or rent on time? N   In the last 12 months, how many places have you lived? 1   In the last 12 months, was there a time when you did not have a steady place to sleep or slept in a shelter (including now)? N   Transportation Needs   In the past 12 months, has lack of transportation kept you from medical appointments or from getting medications? no   In the past 12 months, has lack of transportation kept you from meetings, work, or from getting things needed for daily living? No

## 2024-04-19 NOTE — CARE PLAN
The patient's goals for the shift include      The clinical goals for the shift include To remain safe throughout the shift.    Over the shift, the patient did not make progress toward the following goals. Barriers to progression include . Recommendations to address these barriers include .    Problem: Diabetes  Goal: Vital signs within normal range for age by end of shift  Outcome: Progressing

## 2024-04-19 NOTE — PROGRESS NOTES
Patient: Shabbir Laurent  Room/bed: 241/241-A  Admitted on: 4/18/2024    Age: 73 y.o.   Gender: male  Code Status:  Full Code   Admitting Dx: NSTEMI (non-ST elevated myocardial infarction) (Multi) [I21.4]    MRN: [id number]  PCP: Jean Polanco DO  Preferred Pharm: [unfilled]    Attending: [unfilled]  Resident: Kashif Ellis DO  TCC: No care team member to display      Overnight Events     No acute events overnight.    Subjective   Patient seen at bedside.  States he only feels generally weak and denies any other symptoms currently.  Unfortunately due to the patient's elevated INR his cath was postponed until Monday 4/22.  He is complaining of no other issues today.      Objective    Physical Exam   Constitutional: Appears stated age. In NAD.   HEENT: NC/AT, EOMI, clear sclera, moist mucous membranes  CV: RRR, Systolic click  PULM: CTAB, no coughing or wheezing  ABDOMEN: Soft, NT/ND. No TTP. + BSx4.  SKIN: Normal Color, Warm, Dry, No Rashes   EXTREMITIES: Non-Tender, Full ROM, No Pedal Edema  NEURO: A&O x 4, nonfocal neurological exam.  PSYCH: Normal Mood & Behavior    Temp:  [35.6 °C (96.1 °F)-36.4 °C (97.5 °F)] 36.2 °C (97.2 °F)  Heart Rate:  [64-90] 64  Resp:  [12-23] 15  BP: (103-143)/(50-98) 143/83      Intake/Output Summary (Last 24 hours) at 4/19/2024 1224  Last data filed at 4/19/2024 0821  Gross per 24 hour   Intake 1272.64 ml   Output 25 ml   Net 1247.64 ml       Vitals:    04/18/24 1146   Weight: 125 kg (275 lb)             I/Os    Intake/Output Summary (Last 24 hours) at 4/19/2024 1224  Last data filed at 4/19/2024 0821  Gross per 24 hour   Intake 1272.64 ml   Output 25 ml   Net 1247.64 ml       Labs:   Results from last 72 hours   Lab Units 04/19/24  0906 04/18/24  1203   SODIUM mmol/L 138 134*   POTASSIUM mmol/L 3.8 4.7   CHLORIDE mmol/L 102 101   CO2 mmol/L 23 25   BUN mg/dL 19 20   CREATININE mg/dL 1.07 1.16   GLUCOSE mg/dL 137* 154*   CALCIUM mg/dL 9.4 9.4   ANION GAP mmol/L 17 13   EGFR  "mL/min/1.73m*2 73 67      Results from last 72 hours   Lab Units 04/18/24  1203   WBC AUTO x10*3/uL 9.3   HEMOGLOBIN g/dL 15.6   HEMATOCRIT % 47.6   PLATELETS AUTO x10*3/uL 267   NEUTROS PCT AUTO % 68.2   LYMPHS PCT AUTO % 17.2   MONOS PCT AUTO % 12.5   EOS PCT AUTO % 1.3      Lab Results   Component Value Date    CALCIUM 9.4 04/19/2024    PHOS 3.1 01/06/2021      Lab Results   Component Value Date    CRP 0.54 03/18/2024      [unfilled]     Micro/ID:   No results found for the last 90 days.                   No lab exists for component: \"AGALPCRNB\"   .ID  No results found for: \"URINECULTURE\", \"BLOODCULT\", \"CSFCULTSMEAR\"    Images:       Meds    Scheduled medications  aspirin, 81 mg, oral, Daily  atorvastatin, 40 mg, oral, Nightly  cholecalciferol, 2,000 Units, oral, Daily  empagliflozin, 25 mg, oral, Daily  FLUoxetine, 20 mg, oral, Daily  folic acid, 1 mg, oral, Daily  furosemide, 20 mg, oral, Once per day on Monday Wednesday Friday  insulin lispro, 0-10 Units, subcutaneous, q4h  leflunomide, 20 mg, oral, Daily  levothyroxine, 137 mcg, oral, Daily  loratadine, 10 mg, oral, Daily  losartan, 100 mg, oral, Daily  metoprolol tartrate, 25 mg, oral, BID  polyethylene glycol, 17 g, oral, Daily  pregabalin, 200 mg, oral, Nightly  ticagrelor, 90 mg, oral, BID      Continuous medications  heparin, 0-4,000 Units/hr, Last Rate: 1,600 Units/hr (04/19/24 1100)      PRN medications  PRN medications: acetaminophen, dextrose, dextrose, glucagon, glucagon, heparin, HYDROcodone-acetaminophen, HYDROmorphone, melatonin, nitroglycerin     Assessment and Plan    Shabbir Laurent is a 73 y.o. male with PMH of  mechanical aortic valve on warfarin, open heart surgery, CHF, HTN, HLD, AUBREY (uses CPAP), and T2DM, who presented to the hospital for diaphoresis and generalized weakness 2/2 NSTEMI. Heparin drip. Cardiology consulted. Mabel to perform heart cath 4/21.     Acute medical issues  #NSTEMI  # Anticoagulated state  - Asprin, " Brillinta, and Heparin drip  - Hold warfarin while anticipating heart cath for 4/21  -EKG demonstrated ST depression in anterior leads and ST inversionis in the inferior leads.  -CT angio demonstrated no PE  -Cardiology constuled  -Discussed with Dr. Monroe. Cath tomorrow by Dr. Monroe  -Echo after cath, last echo in November, may be done as outpatient per cardiologies recommendations     Chronic medical issues     #CHF  - Lasix 20 mg MWF  -Jardiance 25 mg     #HTN  -Losartan 100 mg     #HLD  -Atorvastatin 40 mg     #Type 2 DM  - Metformin 850 mg bid     #Anxiety  - Prozac 20 mg     #Nutrition  -Folic acid 1 mg     #Chronic LBP  # seronegative inflammatory arthritis  -Leflunomide 20 mg  -Tylenol prn first line  -Norco 5-325 tid prn  -Pregablin 200 mg     #Hypothyroidism  -Synthroid 137 mg     #Nasal congestion  -Loratadine 10 mg     Fluids: IVF  Electrolytes: Replete as needed   Nutrition: Regular  GI Ppx: None  DVT Ppx: Heparin ggt  Oxygenation: RA  Antibiotics: None     Dispo: Shabbir Laurent is a 73 y.o. male who presented with NSTEMI. Heparin drip. Cardiology consulted. Mabel to perform heart cath 4/21. ELOS >48hrs    Kashif Ellis DO

## 2024-04-19 NOTE — HOSPITAL COURSE
ED course:   In the ED the patient was found to have  elevated troponin above 6000 which is down trending.  Patient was also hyponatremic with elevated TSH. FLU/COVID, CBC, and magnesium where within normal limits. EKG of patient demonstrated ST depression in anterior leads with ST inversion in the inferior leads. Patient underwent a CT angio which showed no evidence of PE.  CXR showed no acute process. INR was 2.7. Patient given 1L of LR and 324 mg of Asprin. Heparin drip was started    Hospital Course:  Shabbir Laurent is a 73 y.o. male with PMH of  mechanical aortic valve on warfarin, open heart surgery, CHF, HTN, HLD, AUBREY (uses CPAP), [id number], who presented to the hospital for diaphoresis and generalized weakness 2/2 NSTEMI. Heparin drip. Cardiology consulted. Dr. Monroe scheduled cath on mon 4/22 due to high INR 2.7.  Warfarin held. INR improving. Left heart cath 4/22  Patient received 2 stents.  For discharge follow up with Dr. Goins (cardiologist) and PCP.  Heparin discontinued and warfarin restarted with Asprin and birilinta.  Will discontinue aspirin once INR is therapeutic.

## 2024-04-19 NOTE — CONSULTS
"Inpatient consult to Cardiology  Consult performed by: Keren Holguin PA-C  Consult ordered by: Daljit Stein DO        History Of Present Illness:    Shabbir Laurent is a 73 y.o. male presenting with concern for elevated blood pressure, chest discomfort. Patient reports that Monday of this week began to experience what he described as \"indigestion\". Reports that pain continued for about 3 hours after he had taken medications for heart burn. Pain did not reoccur but noted that his blood pressures were very high at home (systolic noted >180). Yesterday, patient was walking to the bathroom at home, experienced significant diaphoresis. Chest discomfort prompting presentation. Current. Denies any chest pain, chest pressure, palpitations, dizziness, cough, shortness of breath, orthopnea, edema.       Last Recorded Vitals:  Vitals:    04/18/24 2130 04/19/24 0000 04/19/24 0600 04/19/24 0646   BP: 110/68 103/65  112/50   BP Location:    Left arm   Patient Position:    Lying   Pulse:  69 64    Resp:  20 16 15   Temp: 36.4 °C (97.5 °F)   36.3 °C (97.3 °F)   TempSrc: Temporal   Temporal   SpO2:  96% 92%    Weight:       Height:           Last Labs:  CBC - 4/18/2024: 12:03 PM  9.3 15.6 267    47.6      CMP - 4/18/2024: 12:03 PM  9.4 7.8 83 --- 0.9   _ 4.3 31 69      PTT - 4/18/2024:  2:50 PM  2.7   30.3 47     Troponin I, High Sensitivity   Date/Time Value Ref Range Status   04/18/2024 02:50 PM 6,116 (HH) 0 - 20 ng/L Final     Comment:     Previous result verified on 4/18/2024 1259 on specimen/case [id number] called with component TRPHS for procedure Troponin I, High Sensitivity, Initial with value 6,868 ng/L.   04/18/2024 01:11 PM 6,619 (HH) 0 - 20 ng/L Final     Comment:     Previous result verified on 4/18/2024 1259 on specimen/case [id number] called with component TRPHS for procedure Troponin I, High Sensitivity, Initial with value 6,868 ng/L.   04/18/2024 12:03 PM 6,868 (HH) 0 - 20 ng/L Final     BNP "   Date/Time Value Ref Range Status   04/18/2024 12:03  (H) 0 - 99 pg/mL Final     POC HEMOGLOBIN A1c   Date/Time Value Ref Range Status   10/04/2023 10:41 AM 6.7 (A) 4.2 - 6.5 % Final   05/03/2023 12:12 PM 6.3 4.2 - 6.5 % Final     LDL Calculated   Date/Time Value Ref Range Status   10/04/2023 09:58 AM   Final     Comment:     The calculation of LDL and VLDL are inaccurate when the Triglycerides are greater than 400 mg/dL or when the patient is non-fasting. If LDL measurement is necessary contact the testing laboratory for an alternative LDL assay.                                  Near   Borderline      AGE      Desirable  Optimal    High     High     Very High     0-19 Y     0 - 109     ---    110-129   >/= 130     ----    20-24 Y     0 - 119     ---    120-159   >/= 160     ----      >24 Y     0 -  99   100-129  130-159   160-189     >/=190       VLDL   Date/Time Value Ref Range Status   10/04/2023 09:58 AM   Final     Comment:     Unable to calculate VLDL.   05/03/2023 11:17 AM SEE COMMENT 0 - 40 mg/dL Final     Comment:       Unable to calculate VLDL.   05/19/2022 09:01 AM SEE COMMENT 0 - 40 mg/dL Final     Comment:       Unable to calculate VLDL.   05/06/2021 11:34 AM 67 (H) 0 - 40 mg/dL Final      Last I/O:  I/O last 3 completed shifts:  In: 1272.6 (10.2 mL/kg) [P.O.:120; I.V.:152.6 (1.2 mL/kg); IV Piggyback:1000]  Out: 0 (0 mL/kg)   Weight: 124.7 kg     Past Cardiology Tests (Last 3 Years):  EKG:  ECG 12 lead 04/18/2024:     Echo:  Transthoracic Echo (TTE) Complete 11/10/2023  CONCLUSIONS:   1. Left ventricular systolic function is normal with a 60-65% estimated ejection fraction.   2. Abnormal septal motion consistent with left bundle branch block.   3. Spectral Doppler shows an impaired relaxation pattern of left ventricular diastolic filling.   4. There is moderate left ventricular hypertrophy.   5. There is mildly reduced right ventricular systolic function.   6. The left atrium is moderately  dilated.   7. The right atrium is moderately dilated.   8. Mild aortic valve regurgitation.   9. Normally functioning mechanical aortic valve with normal hemodynamics.  10. Prosthetic graft in the aortic position measuring 4.5 cm.    Transthoracic Echocardiogram (11/11/2022):   CONCLUSIONS:   1. Left ventricular systolic function is low normal with a 50-55% estimated ejection fraction.   2. Spectral Doppler shows an impaired relaxation pattern of left ventricular diastolic filling.   3. There is moderate left ventricular hypertrophy.   4. Mechanical aortic valve functioning normally.   5. Prosthetic graft in the ascending aorta position.   6. There is global hypokinesis of the left ventricle with minor regional variations.    Transthoracic Echocardiogram (05/07/2021):   CONCLUSIONS:   1. The left ventricular systolic function is low normal with a 50-55% estimated ejection fraction.   2. There is moderate left ventricular hypertrophy.   3. The left atrium is moderately dilated.   4. Mechanical aortic valve functioning normally.   5. There is mild aortic valve regurgitation.   6. Ascending aorta measures 4.1 cm.     Transthoracic Echocardiogram (05/15/2020):  CONCLUSIONS:   1. The left ventricular systolic function is low normal with a 50-55% estimated ejection fraction.   2. Spectral Doppler shows an impaired relaxation pattern of left ventricular diastolic filling.   3. There is moderate left ventricular hypertrophy.   4. The left atrium is mild to moderately dilated.   5. Bioprosthetic aortic valve with a peak gradient of 34 mmHg and a mean gradient of 20 mmHg.   6. There is mild aortic valve regurgitation.   7. Ascending aorta measures 4.2 cm.   8. In comparison to the record of 5/10/2019 aortic valve gradients are mildly increased.    Transthoracic Echocardiogram (05/10/2019):   CONCLUSIONS:   1. The left ventricular systolic function is normal with a 55-60% estimated ejection fraction.   2. Spectral Doppler  shows an impaired relaxation pattern of left ventricular diastolic filling.   3. There is moderate left ventricular hypertrophy.   4. The left atrium is moderately dilated.   5. Bioprosthetic aortic valve with normal valvular function.   6. There is mild aortic valve regurgitation.   7. Aortic valve velocities are unchanged in comparison to the record of 5/18/18/.        Ejection Fractions:  EF   Date/Time Value Ref Range Status   11/10/2023 10:59 AM 65       Cath:  [facility] (2012): This demonstrated 95% left anterior descending treated with balloon angioplasty and stent placement, 80% circumflex treated with balloon angioplasty and stent placement and a distal 95% right coronary artery supplying a small posterolateral branch. The vein graft to the distal right coronary artery was occluded.    Stress Test:  No results found for this or any previous visit from the past 1095 days.    Imaging:  CT Angio Chest for PE (04/18/2024):   IMPRESSION:  1. No pulmonary embolism.  2. Chronic interstitial changes with no acute process.    CXR (04/18/2024):   IMPRESSION:  No acute cardiopulmonary process.      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), Pain in right ankle and joints of right foot (01/15/2020), Personal history of colonic polyps, Personal history of diseases of the skin and subcutaneous tissue (09/29/2016), Personal history of other (healed) physical injury and trauma (08/11/2014), Personal history of other diseases of the circulatory system, Personal history of other diseases of the  musculoskeletal system and connective tissue, Personal history of other diseases of the musculoskeletal system and connective tissue (07/06/2013), Personal history of other diseases of the nervous system and sense organs (10/28/2013), Personal history of other diseases of the respiratory system (03/04/2019), Personal history of other endocrine, nutritional and metabolic disease, Personal history of other endocrine, nutritional and metabolic disease (05/02/2019), Personal history of other endocrine, nutritional and metabolic disease (05/10/2018), Personal history of other mental and behavioral disorders (04/07/2020), Personal history of other specified conditions, Personal history of other specified conditions, Prediabetes (10/04/2018), Presence of aortocoronary bypass graft, Presence of prosthetic heart valve, Rash and other nonspecific skin eruption (01/10/2017), Spondylosis without myelopathy or radiculopathy, lumbar region (07/06/2013), Strain of unspecified muscles, fascia and tendons at thigh level, unspecified thigh, initial encounter (12/05/2013), Unspecified asthma, uncomplicated ([facility]) (03/18/2019), and Unspecified fall, initial encounter (09/26/2019).    Past Surgical History:  He has a past surgical history that includes Knee arthroscopy w/ debridement (10/22/2013); Gallbladder surgery (10/22/2013); Colonoscopy (05/16/2013); Coronary artery bypass graft (04/23/2018); Other surgical history (04/23/2018); Colonoscopy (09/21/2017); Cardiac catheterization (04/23/2018); Colonoscopy (07/25/2014); Other surgical history (10/02/2014); Aortic valve replacement (06/25/2014); Other surgical history (08/03/2022); Other surgical history (08/03/2022); and Other surgical history (10/28/2013).      Social History:  He reports that he has quit smoking. His smoking use included cigarettes. He has never used smokeless tobacco. He reports that he does not drink alcohol and does not use drugs.    Family History:  Family  History   Problem Relation Name Age of Onset    Arthritis Mother      Other (Stroke Syndrome) Mother      Arthritis Father      Other (CABG) Father      Sleep apnea Son          Allergies:  Hydrochlorothiazide, Phenylpropanolamine, Amiloride-hydrochlorothiazide, Ceramide 3b, and Chlorpheniramine-phenylpropan    Inpatient Medications:  Scheduled medications   Medication Dose Route Frequency    aspirin  81 mg oral Daily    atorvastatin  40 mg oral Nightly    cholecalciferol  2,000 Units oral Daily    empagliflozin  25 mg oral Daily    FLUoxetine  20 mg oral Daily    folic acid  1 mg oral Daily    furosemide  20 mg oral Once per day on Monday Wednesday Friday    leflunomide  20 mg oral Daily    levothyroxine  137 mcg oral Daily    loratadine  10 mg oral Daily    losartan  100 mg oral Daily    metFORMIN  850 mg oral BID with meals    metoprolol tartrate  25 mg oral BID    polyethylene glycol  17 g oral Daily    pregabalin  200 mg oral Nightly    ticagrelor  90 mg oral BID     PRN medications   Medication    acetaminophen    heparin    HYDROcodone-acetaminophen    HYDROmorphone    melatonin    nitroglycerin     Continuous Medications   Medication Dose Last Rate    heparin  0-4,000 Units/hr 14 Units/hr (04/19/24 0712)     Outpatient Medications:  Current Outpatient Medications   Medication Instructions    aspirin 81 mg EC tablet 1 tablet, oral, Every morning    blood glucose control, normal solution Use as directed    cetirizine (ZYRTEC) 10 mg capsule 1 capsule, oral, Every morning    cholecalciferol (Vitamin D-3) 50 mcg (2,000 unit) capsule 1 capsule, oral, Daily    enoxaparin (LOVENOX) 120 mg, subcutaneous, Every 12 hours    FLUoxetine (PROZAC) 20 mg, oral, Daily    fluticasone propion-salmeteroL (Advair Diskus) 250-50 mcg/dose diskus inhaler 1 puff, inhalation, 2 times daily RT, During summer (grass cutting)    folic acid (FOLVITE) 1 mg, oral, Daily    furosemide (LASIX) 20 mg, oral, 4 times weekly, Take every Mon,  Wed, Fri, and Sat    HYDROcodone-acetaminophen (Norco) 5-325 mg tablet 1 tablet, oral, 3 times daily PRN    [START ON 5/8/2024] HYDROcodone-acetaminophen (Norco) 5-325 mg tablet 1 tablet, oral, 3 times daily PRN    [START ON 6/5/2024] HYDROcodone-acetaminophen (Norco) 5-325 mg tablet 1 tablet, oral, 3 times daily PRN    Jardiance 25 mg TAKE 1 TABLET BY MOUTH DAILY    krill-om3-dha-epa-om6-lip-astx (Krill Oil, Omega 3 and 6,) 1,500-165-67.5 mg capsule 1 capsule, oral, Daily    lancets 33 gauge misc 1 each, percutaneous, Daily, Test BS daily    leflunomide (ARAVA) 20 mg, oral, Daily    levothyroxine (SYNTHROID, LEVOXYL) 137 mcg, oral, Daily    losartan (COZAAR) 100 mg, oral, Daily    lubricating eye drops ophthalmic solution 1 drop, Both Eyes, As needed    metFORMIN (GLUCOPHAGE) 850 mg, oral, 2 times daily with meals    multivit-min/FA/lycopen/lutein (CENTRUM SILVER MEN ORAL) 1 tablet, oral, Daily    nitroglycerin (NITROSTAT) 0.4 mg, sublingual, Every 5 min PRN    OneTouch Ultra Test strip Test blood glucose once daily    pregabalin (LYRICA) 200 mg, oral, Nightly    rosuvastatin (CRESTOR) 20 mg, oral, Daily    warfarin (Coumadin) 5 mg tablet TAKE 1 AND 1/2 TABLETS BY MOUTH  5 TIMES WEEKLY AND 1 TABLET BY  MOUTH TWICE WEEKLY DAILY OR AS  DIRECTED AFTER INR TESTING. DO  NOT START BEFORE MARCH 11, 2023    warfarin (Coumadin) 5 mg tablet 1.5 tablets, oral, 5 times weekly, On Sunday, Monday, Wednesday, Thursday, Saturday evenings       Physical Exam:  Physical Exam  Constitutional:       Appearance: Normal appearance.   HENT:      Head: Normocephalic.      Nose: Nose normal.      Mouth/Throat:      Mouth: Mucous membranes are moist.   Eyes:      Conjunctiva/sclera: Conjunctivae normal.   Cardiovascular:      Rate and Rhythm: Normal rate and regular rhythm.      Heart sounds: Murmur heard.   Pulmonary:      Effort: Pulmonary effort is normal.      Breath sounds: Normal breath sounds.   Abdominal:      Palpations: Abdomen is  soft.   Musculoskeletal:         General: Normal range of motion.   Skin:     General: Skin is warm and dry.   Neurological:      General: No focal deficit present.      Mental Status: He is alert. Mental status is at baseline.   Psychiatric:         Mood and Affect: Mood normal.         Behavior: Behavior normal.            Assessment/Plan   Shabbir Laurent is a 74 yo male with a PMH of CAD s/p Single vessel bypass, Aortic dysfunction s/p mechanical aortic valve both in 2003, HFpEF, HTN, HLD, AUBREY w/ CPAP, DM Type II who was admitted for NSTEMI. Most recent C in 2012 with 95% LAD tx with PTCA and PCI, 80% circ s/p PCI, distal RCA 95%, VG to RCA graft not patent.      #NSTEMI w/ hx of CAD s/p single vessel bypass VG to RCA in 2003  #HTN  #HLD  #Chronic diastolic heart failure     -INR 2.7 >2.4  -NPO Sunday for LHC Monday. Hold Warfarin  -C/w Heparin gtt for ACS protocol, DAPT w/ ASA, Brilinta   -Repeat echocardiogram recommended  -Continue Jardiance, lasix, Losartan, Metoprolol as previously ordered  -Will follow          Code Status:  Full Code    I spent  minutes in the professional and overall care of this patient.        Keren Holguin PA-C

## 2024-04-19 NOTE — CARE PLAN
Problem: Fall/Injury  Goal: Not fall by end of shift  Outcome: Progressing  Goal: Be free from injury by end of the shift  Outcome: Progressing  Goal: Verbalize understanding of personal risk factors for fall in the hospital  Outcome: Progressing  Goal: Verbalize understanding of risk factor reduction measures to prevent injury from fall in the home  Outcome: Progressing     Problem: Safety - Adult  Goal: Free from fall injury  Outcome: Progressing     Problem: Diabetes  Goal: No changes in neurological exam by end of shift  Outcome: Progressing  Goal: Learn about and adhere to nutrition recommendations by end of shift  Outcome: Progressing  Goal: Vital signs within normal range for age by end of shift  Outcome: Progressing       The clinical goals for the shift include pt heparin assay would return as theraputic this shift. Progressing  Pt did not have cardiac cath today due to inr 2.4. pt had 1 therapeutic draw today 0.4. Next draw 1830. Otherwise uneventful shift.   Cardiac Cath scheduled for Monday.

## 2024-04-20 LAB
ALBUMIN SERPL BCP-MCNC: 3.7 G/DL (ref 3.4–5)
ANION GAP SERPL CALC-SCNC: 14 MMOL/L (ref 10–20)
APTT PPP: 68 SECONDS (ref 27–38)
BUN SERPL-MCNC: 22 MG/DL (ref 6–23)
CALCIUM SERPL-MCNC: 8.9 MG/DL (ref 8.6–10.3)
CHLORIDE SERPL-SCNC: 103 MMOL/L (ref 98–107)
CO2 SERPL-SCNC: 25 MMOL/L (ref 21–32)
CREAT SERPL-MCNC: 1.09 MG/DL (ref 0.5–1.3)
EGFRCR SERPLBLD CKD-EPI 2021: 72 ML/MIN/1.73M*2
ERYTHROCYTE [DISTWIDTH] IN BLOOD BY AUTOMATED COUNT: 13.7 % (ref 11.5–14.5)
GLUCOSE BLD MANUAL STRIP-MCNC: 114 MG/DL (ref 74–99)
GLUCOSE BLD MANUAL STRIP-MCNC: 129 MG/DL (ref 74–99)
GLUCOSE BLD MANUAL STRIP-MCNC: 132 MG/DL (ref 74–99)
GLUCOSE BLD MANUAL STRIP-MCNC: 167 MG/DL (ref 74–99)
GLUCOSE BLD MANUAL STRIP-MCNC: 172 MG/DL (ref 74–99)
GLUCOSE BLD MANUAL STRIP-MCNC: 343 MG/DL (ref 74–99)
GLUCOSE SERPL-MCNC: 154 MG/DL (ref 74–99)
HCT VFR BLD AUTO: 43 % (ref 41–52)
HGB BLD-MCNC: 14 G/DL (ref 13.5–17.5)
INR PPP: 2.1 (ref 0.9–1.1)
MAGNESIUM SERPL-MCNC: 1.97 MG/DL (ref 1.6–2.4)
MCH RBC QN AUTO: 30.1 PG (ref 26–34)
MCHC RBC AUTO-ENTMCNC: 32.6 G/DL (ref 32–36)
MCV RBC AUTO: 93 FL (ref 80–100)
NRBC BLD-RTO: 0 /100 WBCS (ref 0–0)
PHOSPHATE SERPL-MCNC: 3.1 MG/DL (ref 2.5–4.9)
PLATELET # BLD AUTO: 252 X10*3/UL (ref 150–450)
POTASSIUM SERPL-SCNC: 3.6 MMOL/L (ref 3.5–5.3)
PROTHROMBIN TIME: 23.7 SECONDS (ref 9.8–12.8)
RBC # BLD AUTO: 4.65 X10*6/UL (ref 4.5–5.9)
SODIUM SERPL-SCNC: 138 MMOL/L (ref 136–145)
UFH PPP CHRO-ACNC: 0.2 IU/ML
UFH PPP CHRO-ACNC: 0.3 IU/ML
UFH PPP CHRO-ACNC: 0.4 IU/ML
WBC # BLD AUTO: 6.7 X10*3/UL (ref 4.4–11.3)

## 2024-04-20 PROCEDURE — 85520 HEPARIN ASSAY: CPT

## 2024-04-20 PROCEDURE — 85610 PROTHROMBIN TIME: CPT

## 2024-04-20 PROCEDURE — 82947 ASSAY GLUCOSE BLOOD QUANT: CPT

## 2024-04-20 PROCEDURE — 85027 COMPLETE CBC AUTOMATED: CPT

## 2024-04-20 PROCEDURE — 80069 RENAL FUNCTION PANEL: CPT

## 2024-04-20 PROCEDURE — 99232 SBSQ HOSP IP/OBS MODERATE 35: CPT | Performed by: PHYSICIAN ASSISTANT

## 2024-04-20 PROCEDURE — 94660 CPAP INITIATION&MGMT: CPT

## 2024-04-20 PROCEDURE — 2500000001 HC RX 250 WO HCPCS SELF ADMINISTERED DRUGS (ALT 637 FOR MEDICARE OP)

## 2024-04-20 PROCEDURE — 99231 SBSQ HOSP IP/OBS SF/LOW 25: CPT

## 2024-04-20 PROCEDURE — 2500000006 HC RX 250 W HCPCS SELF ADMINISTERED DRUGS (ALT 637 FOR ALL PAYERS)

## 2024-04-20 PROCEDURE — 85730 THROMBOPLASTIN TIME PARTIAL: CPT

## 2024-04-20 PROCEDURE — 1200000002 HC GENERAL ROOM WITH TELEMETRY DAILY

## 2024-04-20 PROCEDURE — 2500000004 HC RX 250 GENERAL PHARMACY W/ HCPCS (ALT 636 FOR OP/ED)

## 2024-04-20 PROCEDURE — 2500000002 HC RX 250 W HCPCS SELF ADMINISTERED DRUGS (ALT 637 FOR MEDICARE OP, ALT 636 FOR OP/ED)

## 2024-04-20 PROCEDURE — 36415 COLL VENOUS BLD VENIPUNCTURE: CPT

## 2024-04-20 PROCEDURE — 83735 ASSAY OF MAGNESIUM: CPT

## 2024-04-20 RX ADMIN — FLUOXETINE HYDROCHLORIDE 20 MG: 20 CAPSULE ORAL at 09:59

## 2024-04-20 RX ADMIN — LORATADINE 10 MG: 10 TABLET ORAL at 09:59

## 2024-04-20 RX ADMIN — TICAGRELOR 90 MG: 90 TABLET ORAL at 09:58

## 2024-04-20 RX ADMIN — LOSARTAN POTASSIUM 100 MG: 50 TABLET, FILM COATED ORAL at 09:59

## 2024-04-20 RX ADMIN — LEFLUNOMIDE 20 MG: 10 TABLET, FILM COATED ORAL at 09:59

## 2024-04-20 RX ADMIN — INSULIN LISPRO 2 UNITS: 100 INJECTION, SOLUTION INTRAVENOUS; SUBCUTANEOUS at 10:00

## 2024-04-20 RX ADMIN — FOLIC ACID 1 MG: 1 TABLET ORAL at 09:59

## 2024-04-20 RX ADMIN — INSULIN LISPRO 8 UNITS: 100 INJECTION, SOLUTION INTRAVENOUS; SUBCUTANEOUS at 05:56

## 2024-04-20 RX ADMIN — LEVOTHYROXINE SODIUM 137 MCG: 137 TABLET ORAL at 09:59

## 2024-04-20 RX ADMIN — EMPAGLIFLOZIN 25 MG: 10 TABLET, FILM COATED ORAL at 09:58

## 2024-04-20 RX ADMIN — ASPIRIN 81 MG: 81 TABLET, COATED ORAL at 09:57

## 2024-04-20 RX ADMIN — INSULIN LISPRO 2 UNITS: 100 INJECTION, SOLUTION INTRAVENOUS; SUBCUTANEOUS at 21:15

## 2024-04-20 RX ADMIN — TICAGRELOR 90 MG: 90 TABLET ORAL at 20:10

## 2024-04-20 RX ADMIN — ATORVASTATIN CALCIUM 40 MG: 40 TABLET, FILM COATED ORAL at 20:11

## 2024-04-20 RX ADMIN — HEPARIN SODIUM 1800 UNITS/HR: 10000 INJECTION, SOLUTION INTRAVENOUS at 07:21

## 2024-04-20 RX ADMIN — METOPROLOL TARTRATE 25 MG: 25 TABLET, FILM COATED ORAL at 20:11

## 2024-04-20 RX ADMIN — Medication 2000 UNITS: at 09:58

## 2024-04-20 RX ADMIN — METOPROLOL TARTRATE 25 MG: 25 TABLET, FILM COATED ORAL at 09:58

## 2024-04-20 RX ADMIN — PREGABALIN 200 MG: 75 CAPSULE ORAL at 20:10

## 2024-04-20 RX ADMIN — HEPARIN SODIUM 18 UNITS/HR: 10000 INJECTION, SOLUTION INTRAVENOUS at 13:34

## 2024-04-20 ASSESSMENT — COGNITIVE AND FUNCTIONAL STATUS - GENERAL
DAILY ACTIVITIY SCORE: 24
MOBILITY SCORE: 24
MOBILITY SCORE: 24
DAILY ACTIVITIY SCORE: 24

## 2024-04-20 ASSESSMENT — PAIN - FUNCTIONAL ASSESSMENT
PAIN_FUNCTIONAL_ASSESSMENT: 0-10
PAIN_FUNCTIONAL_ASSESSMENT: 0-10

## 2024-04-20 ASSESSMENT — PAIN SCALES - GENERAL
PAINLEVEL_OUTOF10: 0 - NO PAIN
PAINLEVEL_OUTOF10: 0 - NO PAIN

## 2024-04-20 NOTE — CARE PLAN
The patient's goals for the shift include      The clinical goals for the shift include Pt will remain HDS this shift    Over the shift, the patient did make progress toward the following goals. Barriers to progression include non-therapeutic heparin assays. Recommendations to address these barriers include testing and rate adjustment. Pt is currently therapeutic, monitored and medicated as ordered this shift.

## 2024-04-20 NOTE — PROGRESS NOTES
Internal Medicine - Daily Progress Note   Hospital Day: 3       Name:Shabbir Laurent, AGE: 73 y.o., GENDER: male, MRN: [id number], ROOM: 240/240-B   CODE STATUS: Full Code  Attending Physician: Daljit Stein DO         Subjective:    Overnight events:  None    The patient was seen and examined at bedside. He has no new complains today.     The patient denies headaches, lightheadedness, changes in speech/vision, photophobia, dysphagia, diaphoresis, Chest pain, dyspnea, Abdominal pain, recent falls or trauma, and changes in bowel/urinary habits.    Cardiology following, Pt is scheduled for [facility] on Monday, currently holding warfarin, if INR not in range for procedure tomorrow will consider giving vit k.     Objective:    Vitals:   Vitals:    04/19/24 1943 04/20/24 0330 04/20/24 0607 04/20/24 1054   BP: 133/74  110/68 102/60   BP Location:    Left arm   Patient Position:    Lying   Pulse:   64 72   Resp:  18 20 16   Temp: 36.2 °C (97.2 °F)  35.8 °C (96.4 °F) 36.3 °C (97.3 °F)   TempSrc: Temporal  Temporal Temporal   SpO2:   95% 95%   Weight:       Height:            Intake/Output Summary (Last 24 hours) at 4/20/2024 1211  Last data filed at 4/20/2024 1054  Gross per 24 hour   Intake 780 ml   Output 1 ml   Net 779 ml        Physical Examination:      Constitutional: Appears stated age. In NAD.   HEENT: NC/AT, EOMI, clear sclera, moist mucous membranes  CV: RRR, Systolic click  PULM: CTAB, no coughing or wheezing  ABDOMEN: Soft, NT/ND. No TTP. + BSx4.  SKIN: Normal Color, Warm, Dry, No Rashes   EXTREMITIES: Non-Tender, Full ROM, No Pedal Edema  NEURO: A&O x 4, nonfocal neurological exam.  PSYCH: Normal Mood & Behavior      Labs:   Results from last 7 days   Lab Units 04/20/24  0542 04/18/24  1203   WBC AUTO x10*3/uL 6.7 9.3   HEMOGLOBIN g/dL 14.0 15.6   MCV fL 93 92   PLATELETS AUTO x10*3/uL 252 267   HEMATOCRIT % 43.0 47.6     Results from last 7 days   Lab Units 04/20/24  0542 04/19/24  0906 04/18/24  1257  "04/18/24  1203   SODIUM mmol/L 138 138  --  134*   POTASSIUM mmol/L 3.6 3.8  --  4.7   BUN mg/dL 22 19  --  20   CREATININE mg/dL 1.09 1.07  --  1.16   CHLORIDE mmol/L 103 102  --  101   POCT HCO3 CALCULATED, VENOUS mmol/L  --   --  24.2  --    MAGNESIUM mg/dL 1.97  --   --  1.91   PHOSPHORUS mg/dL 3.1  --   --   --      Results from last 7 days   Lab Units 04/18/24  1450 04/18/24  1311 04/18/24  1203   TROPHS ng/L 6,116* 6,619* 6,868*   BNP pg/mL  --   --  181*   TSH mIU/L  --   --  6.54*       Microbiology:   No results found for the last 90 days.                   No lab exists for component: \"AGALPCRNB\"   .ID  No results found for: \"URINECULTURE\", \"BLOODCULT\", \"CSFCULTSMEAR\"    Medications:    Scheduled Medications:   aspirin, 81 mg, oral, Daily  atorvastatin, 40 mg, oral, Nightly  cholecalciferol, 2,000 Units, oral, Daily  empagliflozin, 25 mg, oral, Daily  FLUoxetine, 20 mg, oral, Daily  folic acid, 1 mg, oral, Daily  furosemide, 20 mg, oral, Once per day on Monday Wednesday Friday  insulin lispro, 0-10 Units, subcutaneous, q4h  leflunomide, 20 mg, oral, Daily  levothyroxine, 137 mcg, oral, Daily  loratadine, 10 mg, oral, Daily  losartan, 100 mg, oral, Daily  metoprolol tartrate, 25 mg, oral, BID  polyethylene glycol, 17 g, oral, Daily  pregabalin, 200 mg, oral, Nightly  ticagrelor, 90 mg, oral, BID  [Held by provider] warfarin, 5 mg, oral, Once per day on Tuesday Friday  [Held by provider] warfarin, 7.5 mg, oral, Once per day on Sunday Monday Wednesday Thursday Saturday      Continuous Medications:   heparin, 0-4,000 Units/hr, Last Rate: 1,800 Units/hr (04/20/24 0721)      PRN Medications:   PRN medications: acetaminophen, dextrose, dextrose, glucagon, glucagon, heparin, HYDROcodone-acetaminophen, HYDROmorphone, loperamide, melatonin, nitroglycerin     Assessment and Plan:    Problem list:  Problem List Items Addressed This Visit       * (Principal) NSTEMI (non-ST elevated myocardial infarction) (Multi) - " Primary    Relevant Medications    ticagrelor (Brilinta) tablet 90 mg    nitroglycerin (Nitrostat) SL tablet 0.4 mg    metoprolol tartrate (Lopressor) tablet 25 mg    Other Relevant Orders    Case Request Cath Lab: Left Heart Cath (Completed)    Cardiac Catheterization Procedure       Shabbir Laurent is a 73 y.o. male with PMH of  mechanical aortic valve on warfarin, open heart surgery, CHF, HTN, HLD, AUBREY (uses CPAP), and T2DM, who presented to the hospital for diaphoresis and generalized weakness 2/2 NSTEMI. Heparin drip. Cardiology consulted. Mabel to perform heart cath 4/21.       Acute medical issues  #NSTEMI  - Hold warfarin while anticipating heart cath for 4/22  -EKG demonstrated ST depression in anterior leads and ST inversionis in the inferior leads.  -CT angio demonstrated no PE  - Continue Asprin, Brillinta, and Heparin drip  - continue jardiance lasix losartan, metoprolol   - keep K > 4, mg >2  []Cardiology consulted, C on Monday, NPO tomorrow night.   []Echo after cath, last echo in November, may be done as outpatient per cardiologies recommendations     Chronic medical issues  #CHF  - Lasix 20 mg MWF  -Jardiance 25 mg     #HTN  -Losartan 100 mg     #HLD  -Atorvastatin 40 mg     #Type 2 DM  - Metformin 850 mg bid     #Anxiety  - Prozac 20 mg     #Nutrition  -Folic acid 1 mg     #Chronic LBP  # seronegative inflammatory arthritis  -Leflunomide 20 mg  -Tylenol prn first line  -Norco 5-325 tid prn  -Pregablin 200 mg     #Hypothyroidism  -Synthroid 137 mg     #Nasal congestion  -Loratadine 10 mg     Fluids: IVF  Electrolytes: Replete as needed   Nutrition: Regular  GI Ppx: None  DVT Ppx: Heparin ggt  Oxygenation: RA  Antibiotics: None     Dispo: Shabbir Laurent is a 73 y.o. male who presented with NSTEMI. Heparin drip. Cardiology consulted. Mabel to perform heart cath 4/21.    Code status: Full Code     Disposition: 73 y.o. male who was admitted for NSTEMI (non-ST elevated myocardial infarction) (Multi)  [I21.4], cardiology consulted, patient will get LHC done on Monday.       Goran Tinajero MD

## 2024-04-20 NOTE — PROGRESS NOTES
Subjective Data:  Patient doing well. Chest pain free.   Denies any chest pain, chest pressure, palpitations, dizziness, cough, shortness of breath, orthopnea, edema.      Overnight Events:    No acute events reported overnight.      Objective Data:  Last Recorded Vitals:  Vitals:    04/19/24 1558 04/19/24 1943 04/20/24 0330 04/20/24 0607   BP: 119/80 133/74  110/68   BP Location: Right arm      Patient Position: Lying      Pulse:    64   Resp:   18 20   Temp:  36.2 °C (97.2 °F)  35.8 °C (96.4 °F)   TempSrc:  Temporal  Temporal   SpO2:    95%   Weight:       Height:           Last Labs:  CBC - 4/20/2024:  5:42 AM  6.7 14.0 252    43.0      CMP - 4/20/2024:  5:42 AM  8.9 7.5 47 --- 0.9   3.1 3.7 30 87      PTT - 4/20/2024:  5:42 AM  2.1   23.7 68     TROPHS   Date/Time Value Ref Range Status   04/18/2024 02:50 PM 6,116 0 - 20 ng/L Final     Comment:     Previous result verified on 4/18/2024 1259 on specimen/case [id number] called with component TRPHS for procedure Troponin I, High Sensitivity, Initial with value 6,868 ng/L.   04/18/2024 01:11 PM 6,619 0 - 20 ng/L Final     Comment:     Previous result verified on 4/18/2024 1259 on specimen/case [id number] called with component TRPHS for procedure Troponin I, High Sensitivity, Initial with value 6,868 ng/L.   04/18/2024 12:03 PM 6,868 0 - 20 ng/L Final     BNP   Date/Time Value Ref Range Status   04/18/2024 12:03  0 - 99 pg/mL Final     HGBA1C   Date/Time Value Ref Range Status   10/04/2023 10:41 AM 6.7 4.2 - 6.5 % Final   05/03/2023 12:12 PM 6.3 4.2 - 6.5 % Final     LDLCALC   Date/Time Value Ref Range Status   10/04/2023 09:58 AM   Final     Comment:     The calculation of LDL and VLDL are inaccurate when the Triglycerides are greater than 400 mg/dL or when the patient is non-fasting. If LDL measurement is necessary contact the testing laboratory for an alternative LDL assay.                                  Near   Borderline      AGE      Desirable   Optimal    High     High     Very High     0-19 Y     0 - 109     ---    110-129   >/= 130     ----    20-24 Y     0 - 119     ---    120-159   >/= 160     ----      >24 Y     0 -  99   100-129  130-159   160-189     >/=190       VLDL   Date/Time Value Ref Range Status   10/04/2023 09:58 AM   Final     Comment:     Unable to calculate VLDL.   05/03/2023 11:17 AM SEE COMMENT 0 - 40 mg/dL Final     Comment:       Unable to calculate VLDL.   05/19/2022 09:01 AM SEE COMMENT 0 - 40 mg/dL Final     Comment:       Unable to calculate VLDL.   05/06/2021 11:34 AM 67 0 - 40 mg/dL Final      Last I/O:  I/O last 3 completed shifts:  In: 312.6 (2.5 mL/kg) [P.O.:180; I.V.:132.6 (1.1 mL/kg)]  Out: 26 (0.2 mL/kg) [Urine:1 (0 mL/kg/hr); Blood:25]  Weight: 124.7 kg     Past Cardiology Tests (Last 3 Years):  EKG:  ECG 12 lead 04/18/2024:      Echo:  Transthoracic Echo (TTE) Complete 11/10/2023  CONCLUSIONS:   1. Left ventricular systolic function is normal with a 60-65% estimated ejection fraction.   2. Abnormal septal motion consistent with left bundle branch block.   3. Spectral Doppler shows an impaired relaxation pattern of left ventricular diastolic filling.   4. There is moderate left ventricular hypertrophy.   5. There is mildly reduced right ventricular systolic function.   6. The left atrium is moderately dilated.   7. The right atrium is moderately dilated.   8. Mild aortic valve regurgitation.   9. Normally functioning mechanical aortic valve with normal hemodynamics.  10. Prosthetic graft in the aortic position measuring 4.5 cm.     Transthoracic Echocardiogram (11/11/2022):   CONCLUSIONS:   1. Left ventricular systolic function is low normal with a 50-55% estimated ejection fraction.   2. Spectral Doppler shows an impaired relaxation pattern of left ventricular diastolic filling.   3. There is moderate left ventricular hypertrophy.   4. Mechanical aortic valve functioning normally.   5. Prosthetic graft in the ascending  aorta position.   6. There is global hypokinesis of the left ventricle with minor regional variations.     Transthoracic Echocardiogram (05/07/2021):   CONCLUSIONS:   1. The left ventricular systolic function is low normal with a 50-55% estimated ejection fraction.   2. There is moderate left ventricular hypertrophy.   3. The left atrium is moderately dilated.   4. Mechanical aortic valve functioning normally.   5. There is mild aortic valve regurgitation.   6. Ascending aorta measures 4.1 cm.     Transthoracic Echocardiogram (05/15/2020):  CONCLUSIONS:   1. The left ventricular systolic function is low normal with a 50-55% estimated ejection fraction.   2. Spectral Doppler shows an impaired relaxation pattern of left ventricular diastolic filling.   3. There is moderate left ventricular hypertrophy.   4. The left atrium is mild to moderately dilated.   5. Bioprosthetic aortic valve with a peak gradient of 34 mmHg and a mean gradient of 20 mmHg.   6. There is mild aortic valve regurgitation.   7. Ascending aorta measures 4.2 cm.   8. In comparison to the record of 5/10/2019 aortic valve gradients are mildly increased.     Transthoracic Echocardiogram (05/10/2019):   CONCLUSIONS:   1. The left ventricular systolic function is normal with a 55-60% estimated ejection fraction.   2. Spectral Doppler shows an impaired relaxation pattern of left ventricular diastolic filling.   3. There is moderate left ventricular hypertrophy.   4. The left atrium is moderately dilated.   5. Bioprosthetic aortic valve with normal valvular function.   6. There is mild aortic valve regurgitation.   7. Aortic valve velocities are unchanged in comparison to the record of 5/18/18/.        Ejection Fractions:        EF   Date/Time Value Ref Range Status   11/10/2023 10:59 AM 65          Cath:  [facility] (2012): This demonstrated 95% left anterior descending treated with balloon angioplasty and stent placement, 80% circumflex treated with balloon  angioplasty and stent placement and a distal 95% right coronary artery supplying a small posterolateral branch. The vein graft to the distal right coronary artery was occluded.     Stress Test:  No results found for this or any previous visit from the past 1095 days.     Imaging:  CT Angio Chest for PE (04/18/2024):   IMPRESSION:  1. No pulmonary embolism.  2. Chronic interstitial changes with no acute process.     CXR (04/18/2024):   IMPRESSION:  No acute cardiopulmonary process.    Inpatient Medications:  Scheduled medications   Medication Dose Route Frequency    aspirin  81 mg oral Daily    atorvastatin  40 mg oral Nightly    cholecalciferol  2,000 Units oral Daily    empagliflozin  25 mg oral Daily    FLUoxetine  20 mg oral Daily    folic acid  1 mg oral Daily    furosemide  20 mg oral Once per day on Monday Wednesday Friday    insulin lispro  0-10 Units subcutaneous q4h    leflunomide  20 mg oral Daily    levothyroxine  137 mcg oral Daily    loratadine  10 mg oral Daily    losartan  100 mg oral Daily    metoprolol tartrate  25 mg oral BID    polyethylene glycol  17 g oral Daily    pregabalin  200 mg oral Nightly    ticagrelor  90 mg oral BID    [Held by provider] warfarin  5 mg oral Once per day on Tuesday Friday    [Held by provider] warfarin  7.5 mg oral Once per day on Sunday Monday Wednesday Thursday Saturday     PRN medications   Medication    acetaminophen    dextrose    dextrose    glucagon    glucagon    heparin    HYDROcodone-acetaminophen    HYDROmorphone    loperamide    melatonin    nitroglycerin     Continuous Medications   Medication Dose Last Rate    heparin  0-4,000 Units/hr 1,800 Units/hr (04/20/24 0721)       Physical Exam:  Physical Exam  Constitutional:       Appearance: Normal appearance.   HENT:      Head: Normocephalic.      Nose: Nose normal.      Mouth/Throat:      Mouth: Mucous membranes are moist.   Eyes:      Conjunctiva/sclera: Conjunctivae normal.   Cardiovascular:      Rate and  Rhythm: Normal rate and regular rhythm.      Heart sounds: No murmur heard.  Pulmonary:      Effort: Pulmonary effort is normal.      Breath sounds: Normal breath sounds.   Musculoskeletal:         General: Normal range of motion.      Right lower leg: No edema.      Left lower leg: No edema.   Skin:     General: Skin is warm and dry.   Neurological:      General: No focal deficit present.      Mental Status: He is alert. Mental status is at baseline.   Psychiatric:         Mood and Affect: Mood normal.         Behavior: Behavior normal.            Assessment/Plan     Shabbir Laurent is a 72 yo male with a PMH of CAD s/p Single vessel bypass, Aortic dysfunction s/p mechanical aortic valve both in 2003, HFpEF, HTN, HLD, AUBREY w/ CPAP, DM Type II who was admitted for NSTEMI. Most recent LHC in 2012 with 95% LAD tx with PTCA and PCI, 80% circ s/p PCI, distal RCA 95%, VG to RCA graft not patent.        #NSTEMI w/ hx of CAD s/p single vessel bypass VG to RCA in 2003  #HTN  #HLD  #Chronic diastolic heart failure      -INR 2.7 >2.4> 2.1   -NPO Sunday for LHC Monday. Hold Warfarin  -C/w Heparin gtt for ACS protocol, DAPT w/ ASA, Brilinta   -Repeat echocardiogram recommended  -Continue Jardiance, lasix, Losartan, Metoprolol as previously ordered  -Will follow     Peripheral IV 04/19/24 Left Wrist (Active)   Site Assessment Clean;Dry;Intact 04/19/24 2058   Dressing Type Transparent 04/19/24 2058   Line Status Flushed 04/19/24 2058   Dressing Status Dry;Clean 04/19/24 2058   Number of days: 1       Code Status:  Full Code    I spent  minutes in the professional and overall care of this patient.        Keren Holguin PA-C

## 2024-04-21 LAB
ALBUMIN SERPL BCP-MCNC: 3.6 G/DL (ref 3.4–5)
ANION GAP SERPL CALC-SCNC: 15 MMOL/L (ref 10–20)
BUN SERPL-MCNC: 21 MG/DL (ref 6–23)
CALCIUM SERPL-MCNC: 8.9 MG/DL (ref 8.6–10.3)
CHLORIDE SERPL-SCNC: 105 MMOL/L (ref 98–107)
CO2 SERPL-SCNC: 23 MMOL/L (ref 21–32)
CREAT SERPL-MCNC: 1.01 MG/DL (ref 0.5–1.3)
EGFRCR SERPLBLD CKD-EPI 2021: 79 ML/MIN/1.73M*2
ERYTHROCYTE [DISTWIDTH] IN BLOOD BY AUTOMATED COUNT: 13.5 % (ref 11.5–14.5)
GLUCOSE BLD MANUAL STRIP-MCNC: 112 MG/DL (ref 74–99)
GLUCOSE BLD MANUAL STRIP-MCNC: 124 MG/DL (ref 74–99)
GLUCOSE BLD MANUAL STRIP-MCNC: 139 MG/DL (ref 74–99)
GLUCOSE BLD MANUAL STRIP-MCNC: 145 MG/DL (ref 74–99)
GLUCOSE BLD MANUAL STRIP-MCNC: 150 MG/DL (ref 74–99)
GLUCOSE BLD MANUAL STRIP-MCNC: 173 MG/DL (ref 74–99)
GLUCOSE SERPL-MCNC: 142 MG/DL (ref 74–99)
HCT VFR BLD AUTO: 42.6 % (ref 41–52)
HGB BLD-MCNC: 14.2 G/DL (ref 13.5–17.5)
INR PPP: 1.6 (ref 0.9–1.1)
MAGNESIUM SERPL-MCNC: 1.98 MG/DL (ref 1.6–2.4)
MCH RBC QN AUTO: 30.5 PG (ref 26–34)
MCHC RBC AUTO-ENTMCNC: 33.3 G/DL (ref 32–36)
MCV RBC AUTO: 92 FL (ref 80–100)
NRBC BLD-RTO: 0 /100 WBCS (ref 0–0)
PHOSPHATE SERPL-MCNC: 3.6 MG/DL (ref 2.5–4.9)
PLATELET # BLD AUTO: 256 X10*3/UL (ref 150–450)
POTASSIUM SERPL-SCNC: 3.3 MMOL/L (ref 3.5–5.3)
PROTHROMBIN TIME: 18.3 SECONDS (ref 9.8–12.8)
RBC # BLD AUTO: 4.65 X10*6/UL (ref 4.5–5.9)
SODIUM SERPL-SCNC: 140 MMOL/L (ref 136–145)
UFH PPP CHRO-ACNC: 0.4 IU/ML
WBC # BLD AUTO: 5 X10*3/UL (ref 4.4–11.3)

## 2024-04-21 PROCEDURE — 2500000006 HC RX 250 W HCPCS SELF ADMINISTERED DRUGS (ALT 637 FOR ALL PAYERS)

## 2024-04-21 PROCEDURE — 99231 SBSQ HOSP IP/OBS SF/LOW 25: CPT

## 2024-04-21 PROCEDURE — 2500000001 HC RX 250 WO HCPCS SELF ADMINISTERED DRUGS (ALT 637 FOR MEDICARE OP)

## 2024-04-21 PROCEDURE — 94660 CPAP INITIATION&MGMT: CPT

## 2024-04-21 PROCEDURE — 2500000002 HC RX 250 W HCPCS SELF ADMINISTERED DRUGS (ALT 637 FOR MEDICARE OP, ALT 636 FOR OP/ED)

## 2024-04-21 PROCEDURE — 36415 COLL VENOUS BLD VENIPUNCTURE: CPT

## 2024-04-21 PROCEDURE — 83735 ASSAY OF MAGNESIUM: CPT

## 2024-04-21 PROCEDURE — 85520 HEPARIN ASSAY: CPT

## 2024-04-21 PROCEDURE — 2500000004 HC RX 250 GENERAL PHARMACY W/ HCPCS (ALT 636 FOR OP/ED)

## 2024-04-21 PROCEDURE — 82947 ASSAY GLUCOSE BLOOD QUANT: CPT

## 2024-04-21 PROCEDURE — 1200000002 HC GENERAL ROOM WITH TELEMETRY DAILY

## 2024-04-21 PROCEDURE — 85027 COMPLETE CBC AUTOMATED: CPT

## 2024-04-21 PROCEDURE — 80069 RENAL FUNCTION PANEL: CPT

## 2024-04-21 PROCEDURE — 99232 SBSQ HOSP IP/OBS MODERATE 35: CPT | Performed by: PHYSICIAN ASSISTANT

## 2024-04-21 PROCEDURE — 85610 PROTHROMBIN TIME: CPT

## 2024-04-21 RX ORDER — POTASSIUM CHLORIDE 20 MEQ/1
40 TABLET, EXTENDED RELEASE ORAL ONCE
Status: COMPLETED | OUTPATIENT
Start: 2024-04-21 | End: 2024-04-21

## 2024-04-21 RX ORDER — INSULIN LISPRO 100 [IU]/ML
0-10 INJECTION, SOLUTION INTRAVENOUS; SUBCUTANEOUS
Status: DISCONTINUED | OUTPATIENT
Start: 2024-04-21 | End: 2024-04-23 | Stop reason: HOSPADM

## 2024-04-21 RX ADMIN — LOSARTAN POTASSIUM 100 MG: 50 TABLET, FILM COATED ORAL at 09:25

## 2024-04-21 RX ADMIN — METOPROLOL TARTRATE 25 MG: 25 TABLET, FILM COATED ORAL at 09:25

## 2024-04-21 RX ADMIN — INSULIN LISPRO 2 UNITS: 100 INJECTION, SOLUTION INTRAVENOUS; SUBCUTANEOUS at 11:43

## 2024-04-21 RX ADMIN — HEPARIN SODIUM 1800 UNITS/HR: 10000 INJECTION, SOLUTION INTRAVENOUS at 02:49

## 2024-04-21 RX ADMIN — POTASSIUM CHLORIDE 40 MEQ: 1500 TABLET, EXTENDED RELEASE ORAL at 09:25

## 2024-04-21 RX ADMIN — FLUOXETINE HYDROCHLORIDE 20 MG: 20 CAPSULE ORAL at 09:25

## 2024-04-21 RX ADMIN — METOPROLOL TARTRATE 25 MG: 25 TABLET, FILM COATED ORAL at 20:22

## 2024-04-21 RX ADMIN — FOLIC ACID 1 MG: 1 TABLET ORAL at 09:24

## 2024-04-21 RX ADMIN — Medication 2000 UNITS: at 09:24

## 2024-04-21 RX ADMIN — ATORVASTATIN CALCIUM 40 MG: 40 TABLET, FILM COATED ORAL at 20:21

## 2024-04-21 RX ADMIN — TICAGRELOR 90 MG: 90 TABLET ORAL at 09:25

## 2024-04-21 RX ADMIN — LEVOTHYROXINE SODIUM 137 MCG: 137 TABLET ORAL at 09:24

## 2024-04-21 RX ADMIN — PREGABALIN 200 MG: 75 CAPSULE ORAL at 20:21

## 2024-04-21 RX ADMIN — LORATADINE 10 MG: 10 TABLET ORAL at 09:24

## 2024-04-21 RX ADMIN — TICAGRELOR 90 MG: 90 TABLET ORAL at 20:22

## 2024-04-21 RX ADMIN — ASPIRIN 81 MG: 81 TABLET, COATED ORAL at 09:24

## 2024-04-21 RX ADMIN — LEFLUNOMIDE 20 MG: 10 TABLET, FILM COATED ORAL at 09:25

## 2024-04-21 RX ADMIN — EMPAGLIFLOZIN 25 MG: 10 TABLET, FILM COATED ORAL at 09:24

## 2024-04-21 RX ADMIN — HEPARIN SODIUM 1800 UNITS/HR: 10000 INJECTION, SOLUTION INTRAVENOUS at 18:22

## 2024-04-21 ASSESSMENT — PAIN - FUNCTIONAL ASSESSMENT
PAIN_FUNCTIONAL_ASSESSMENT: 0-10

## 2024-04-21 ASSESSMENT — COGNITIVE AND FUNCTIONAL STATUS - GENERAL
DAILY ACTIVITIY SCORE: 24
DAILY ACTIVITIY SCORE: 24
MOBILITY SCORE: 24
MOBILITY SCORE: 24

## 2024-04-21 ASSESSMENT — PAIN SCALES - GENERAL
PAINLEVEL_OUTOF10: 0 - NO PAIN

## 2024-04-21 NOTE — PROGRESS NOTES
Patient: Shabbir Laurent  Room/bed: 240/240-B  Admitted on: 4/18/2024    Age: 73 y.o.   Gender: male  Code Status:  Full Code   Admitting Dx: NSTEMI (non-ST elevated myocardial infarction) (Multi) [I21.4]    MRN: [id number]  PCP: Jean Polanco DO  Preferred Pharm: [unfilled]    Attending: [unfilled]  Resident: Kashif Ellis DO  TCC: No care team member to display      Overnight Events     No acute events overnight.    Subjective   Patient seen at bedside.  Sleeping well. Eating well and having normal BM. No new complaints today.  Scheduled for Left heart cath on 4/22.    Objective    Physical Exam   Constitutional: Appears stated age. In NAD.   HEENT: NC/AT, EOMI, clear sclera, moist mucous membranes  CV: RRR, Systolic click  PULM: CTAB, no coughing or wheezing  ABDOMEN: Soft, NT/ND. No TTP. + BSx4.  SKIN: Normal Color, Warm, Dry, No Rashes   EXTREMITIES: Non-Tender, Full ROM, No Pedal Edema  NEURO: A&O x 4, nonfocal neurological exam.  PSYCH: Normal Mood & Behavior    Temp:  [35.7 °C (96.3 °F)-36.6 °C (97.8 °F)] 36.6 °C (97.8 °F)  Heart Rate:  [57-72] 57  Resp:  [16-18] 18  BP: (121-124)/(73-76) 124/73      Intake/Output Summary (Last 24 hours) at 4/21/2024 1133  Last data filed at 4/21/2024 0432  Gross per 24 hour   Intake 1805.71 ml   Output --   Net 1805.71 ml       Vitals:    04/18/24 1146   Weight: 125 kg (275 lb)             I/Os    Intake/Output Summary (Last 24 hours) at 4/21/2024 1133  Last data filed at 4/21/2024 0432  Gross per 24 hour   Intake 1805.71 ml   Output --   Net 1805.71 ml       Labs:   Results from last 72 hours   Lab Units 04/21/24  0538 04/20/24  0542 04/19/24  0906   SODIUM mmol/L 140 138 138   POTASSIUM mmol/L 3.3* 3.6 3.8   CHLORIDE mmol/L 105 103 102   CO2 mmol/L 23 25 23   BUN mg/dL 21 22 19   CREATININE mg/dL 1.01 1.09 1.07   GLUCOSE mg/dL 142* 154* 137*   CALCIUM mg/dL 8.9 8.9 9.4   ANION GAP mmol/L 15 14 17   EGFR mL/min/1.73m*2 79 72 73   PHOSPHORUS mg/dL 3.6 3.1  --      "  Results from last 72 hours   Lab Units 04/21/24  0538 04/20/24  0542 04/18/24  1203   WBC AUTO x10*3/uL 5.0 6.7 9.3   HEMOGLOBIN g/dL 14.2 14.0 15.6   HEMATOCRIT % 42.6 43.0 47.6   PLATELETS AUTO x10*3/uL 256 252 267   NEUTROS PCT AUTO %  --   --  68.2   LYMPHS PCT AUTO %  --   --  17.2   MONOS PCT AUTO %  --   --  12.5   EOS PCT AUTO %  --   --  1.3      Lab Results   Component Value Date    CALCIUM 8.9 04/21/2024    PHOS 3.6 04/21/2024      Lab Results   Component Value Date    CRP 0.54 03/18/2024      [unfilled]     Micro/ID:   No results found for the last 90 days.                   No lab exists for component: \"AGALPCRNB\"   .ID  No results found for: \"URINECULTURE\", \"BLOODCULT\", \"CSFCULTSMEAR\"    Images:       Meds    Scheduled medications  aspirin, 81 mg, oral, Daily  atorvastatin, 40 mg, oral, Nightly  cholecalciferol, 2,000 Units, oral, Daily  empagliflozin, 25 mg, oral, Daily  FLUoxetine, 20 mg, oral, Daily  folic acid, 1 mg, oral, Daily  furosemide, 20 mg, oral, Once per day on Monday Wednesday Friday  insulin lispro, 0-10 Units, subcutaneous, TID with meals  leflunomide, 20 mg, oral, Daily  levothyroxine, 137 mcg, oral, Daily  loratadine, 10 mg, oral, Daily  losartan, 100 mg, oral, Daily  metoprolol tartrate, 25 mg, oral, BID  polyethylene glycol, 17 g, oral, Daily  pregabalin, 200 mg, oral, Nightly  ticagrelor, 90 mg, oral, BID  [Held by provider] warfarin, 5 mg, oral, Once per day on Tuesday Friday  [Held by provider] warfarin, 7.5 mg, oral, Once per day on Sunday Monday Wednesday Thursday Saturday      Continuous medications  heparin, 0-4,000 Units/hr, Last Rate: 1,800 Units/hr (04/21/24 0432)      PRN medications  PRN medications: acetaminophen, dextrose, dextrose, glucagon, glucagon, heparin, HYDROcodone-acetaminophen, HYDROmorphone, loperamide, melatonin, nitroglycerin     Assessment and Plan    Shabbir Laurent is a 73 y.o. male with PMH of  mechanical aortic valve on warfarin, open heart " surgery 2003, CABG 2012, CHF, HTN, HLD, AUBREY (uses CPAP), and T2DM, who presented to the hospital for diaphoresis and generalized weakness 2/2 NSTEMI. Heparin drip. Cardiology consulted. Mabel to perform heart cath 4/22.        Acute medical issues  #NSTEMI  - Hold warfarin while anticipating heart cath for 4/22, INR improving  -EKG demonstrated ST depression in anterior leads and ST inversionis in the inferior leads.  -CT angio demonstrated no PE  - Continue Asprin, Brillinta, and Heparin drip  - continue jardiance lasix losartan, metoprolol   - Cardiology consulted, [facility] on Monday, NPO tomorrow night.   - Echo after cath, last echo in November, may be done as outpatient per cardiologies recommendations     Chronic medical issues  #CHF  - Lasix 20 mg MWF  -Jardiance 25 mg     #HTN  -Losartan 100 mg     #HLD  -Atorvastatin 40 mg     #Type 2 DM  - Lispro sliding scale     #Anxiety  - Prozac 20 mg     #Nutrition  -Folic acid 1 mg     #Chronic LBP  # seronegative inflammatory arthritis  -Leflunomide 20 mg  -Tylenol prn first line  -Norco 5-325 tid prn  -Pregablin 200 mg     #Hypothyroidism  -Synthroid 137 mg     #Nasal congestion  -Loratadine 10 mg     Fluids: IVF  Electrolytes: Replete as needed   Nutrition: NPO at midnight  GI Ppx: None  DVT Ppx: Heparin ggt  Oxygenation: RA  Antibiotics: None  Code: Full     Dispo: Shabbir Laurent is a 73 y.o. male who presented with NSTEMI. Heparin drip. Cardiology consulted. Mabel to perform heart cath 4/21.      Kashif Ellis DO

## 2024-04-21 NOTE — CARE PLAN
The patient's goals for the shift include      The clinical goals for the shift include Pt will remain HDS this shift    Over the shift, the patient did make progress toward the following goals. Barriers to progression include remaining therapeutic with heparin. Recommendations to address these barriers include monitoring and medicating as ordered.

## 2024-04-21 NOTE — PROGRESS NOTES
Subjective Data:  Doing well. No chest pain.   Denies any chest pain, chest pressure, palpitations, dizziness, cough, shortness of breath, orthopnea, edema.      Overnight Events:    No acute issues reported.      Objective Data:  Last Recorded Vitals:  Vitals:    04/20/24 1933 04/21/24 0000 04/21/24 0400 04/21/24 0621   BP: 124/76   124/73   BP Location: Right arm   Right arm   Patient Position: Sitting   Lying   Pulse: 72   57   Resp: 16 17 18 18   Temp: 35.7 °C (96.3 °F)   36.6 °C (97.8 °F)   TempSrc: Temporal   Temporal   SpO2: 93% 94% 93% 95%   Weight:       Height:           Last Labs:  CBC - 4/21/2024:  5:38 AM  5.0 14.2 256    42.6      CMP - 4/21/2024:  5:38 AM  8.9 7.5 47 --- 0.9   3.6 3.6 30 87      PTT - 4/20/2024:  5:42 AM  1.6   18.3 68     TROPHS   Date/Time Value Ref Range Status   04/18/2024 02:50 PM 6,116 0 - 20 ng/L Final     Comment:     Previous result verified on 4/18/2024 1259 on specimen/case [id number] called with component TRPHS for procedure Troponin I, High Sensitivity, Initial with value 6,868 ng/L.   04/18/2024 01:11 PM 6,619 0 - 20 ng/L Final     Comment:     Previous result verified on 4/18/2024 1259 on specimen/case [id number] called with component TRPHS for procedure Troponin I, High Sensitivity, Initial with value 6,868 ng/L.   04/18/2024 12:03 PM 6,868 0 - 20 ng/L Final     BNP   Date/Time Value Ref Range Status   04/18/2024 12:03  0 - 99 pg/mL Final     HGBA1C   Date/Time Value Ref Range Status   10/04/2023 10:41 AM 6.7 4.2 - 6.5 % Final   05/03/2023 12:12 PM 6.3 4.2 - 6.5 % Final     LDLCALC   Date/Time Value Ref Range Status   10/04/2023 09:58 AM   Final     Comment:     The calculation of LDL and VLDL are inaccurate when the Triglycerides are greater than 400 mg/dL or when the patient is non-fasting. If LDL measurement is necessary contact the testing laboratory for an alternative LDL assay.                                  Near   Borderline      AGE       Desirable  Optimal    High     High     Very High     0-19 Y     0 - 109     ---    110-129   >/= 130     ----    20-24 Y     0 - 119     ---    120-159   >/= 160     ----      >24 Y     0 -  99   100-129  130-159   160-189     >/=190       VLDL   Date/Time Value Ref Range Status   10/04/2023 09:58 AM   Final     Comment:     Unable to calculate VLDL.   05/03/2023 11:17 AM SEE COMMENT 0 - 40 mg/dL Final     Comment:       Unable to calculate VLDL.   05/19/2022 09:01 AM SEE COMMENT 0 - 40 mg/dL Final     Comment:       Unable to calculate VLDL.   05/06/2021 11:34 AM 67 0 - 40 mg/dL Final      Last I/O:  I/O last 3 completed shifts:  In: 2525.7 (20.2 mL/kg) [P.O.:1920; I.V.:605.7 (4.9 mL/kg)]  Out: 1 (0 mL/kg) [Urine:1 (0 mL/kg/hr)]  Weight: 124.7 kg     Past Cardiology Tests (Last 3 Years):  EKG:  ECG 12 lead 04/18/2024:      Echo:  Transthoracic Echo (TTE) Complete 11/10/2023  CONCLUSIONS:   1. Left ventricular systolic function is normal with a 60-65% estimated ejection fraction.   2. Abnormal septal motion consistent with left bundle branch block.   3. Spectral Doppler shows an impaired relaxation pattern of left ventricular diastolic filling.   4. There is moderate left ventricular hypertrophy.   5. There is mildly reduced right ventricular systolic function.   6. The left atrium is moderately dilated.   7. The right atrium is moderately dilated.   8. Mild aortic valve regurgitation.   9. Normally functioning mechanical aortic valve with normal hemodynamics.  10. Prosthetic graft in the aortic position measuring 4.5 cm.     Transthoracic Echocardiogram (11/11/2022):   CONCLUSIONS:   1. Left ventricular systolic function is low normal with a 50-55% estimated ejection fraction.   2. Spectral Doppler shows an impaired relaxation pattern of left ventricular diastolic filling.   3. There is moderate left ventricular hypertrophy.   4. Mechanical aortic valve functioning normally.   5. Prosthetic graft in the ascending  aorta position.   6. There is global hypokinesis of the left ventricle with minor regional variations.     Transthoracic Echocardiogram (05/07/2021):   CONCLUSIONS:   1. The left ventricular systolic function is low normal with a 50-55% estimated ejection fraction.   2. There is moderate left ventricular hypertrophy.   3. The left atrium is moderately dilated.   4. Mechanical aortic valve functioning normally.   5. There is mild aortic valve regurgitation.   6. Ascending aorta measures 4.1 cm.     Transthoracic Echocardiogram (05/15/2020):  CONCLUSIONS:   1. The left ventricular systolic function is low normal with a 50-55% estimated ejection fraction.   2. Spectral Doppler shows an impaired relaxation pattern of left ventricular diastolic filling.   3. There is moderate left ventricular hypertrophy.   4. The left atrium is mild to moderately dilated.   5. Bioprosthetic aortic valve with a peak gradient of 34 mmHg and a mean gradient of 20 mmHg.   6. There is mild aortic valve regurgitation.   7. Ascending aorta measures 4.2 cm.   8. In comparison to the record of 5/10/2019 aortic valve gradients are mildly increased.     Transthoracic Echocardiogram (05/10/2019):   CONCLUSIONS:   1. The left ventricular systolic function is normal with a 55-60% estimated ejection fraction.   2. Spectral Doppler shows an impaired relaxation pattern of left ventricular diastolic filling.   3. There is moderate left ventricular hypertrophy.   4. The left atrium is moderately dilated.   5. Bioprosthetic aortic valve with normal valvular function.   6. There is mild aortic valve regurgitation.   7. Aortic valve velocities are unchanged in comparison to the record of 5/18/18/.        Ejection Fractions:            EF   Date/Time Value Ref Range Status   11/10/2023 10:59 AM 65          Cath:  [facility] (2012): This demonstrated 95% left anterior descending treated with balloon angioplasty and stent placement, 80% circumflex treated with balloon  angioplasty and stent placement and a distal 95% right coronary artery supplying a small posterolateral branch. The vein graft to the distal right coronary artery was occluded.     Stress Test:  No results found for this or any previous visit from the past 1095 days.     Imaging:  CT Angio Chest for PE (04/18/2024):   IMPRESSION:  1. No pulmonary embolism.  2. Chronic interstitial changes with no acute process.     CXR (04/18/2024):   IMPRESSION:  No acute cardiopulmonary process.      Inpatient Medications:  Scheduled medications   Medication Dose Route Frequency    aspirin  81 mg oral Daily    atorvastatin  40 mg oral Nightly    cholecalciferol  2,000 Units oral Daily    empagliflozin  25 mg oral Daily    FLUoxetine  20 mg oral Daily    folic acid  1 mg oral Daily    furosemide  20 mg oral Once per day on Monday Wednesday Friday    insulin lispro  0-10 Units subcutaneous TID with meals    leflunomide  20 mg oral Daily    levothyroxine  137 mcg oral Daily    loratadine  10 mg oral Daily    losartan  100 mg oral Daily    metoprolol tartrate  25 mg oral BID    polyethylene glycol  17 g oral Daily    potassium chloride CR  40 mEq oral Once    pregabalin  200 mg oral Nightly    ticagrelor  90 mg oral BID    [Held by provider] warfarin  5 mg oral Once per day on Tuesday Friday    [Held by provider] warfarin  7.5 mg oral Once per day on Sunday Monday Wednesday Thursday Saturday     PRN medications   Medication    acetaminophen    dextrose    dextrose    glucagon    glucagon    heparin    HYDROcodone-acetaminophen    HYDROmorphone    loperamide    melatonin    nitroglycerin     Continuous Medications   Medication Dose Last Rate    heparin  0-4,000 Units/hr 1,800 Units/hr (04/21/24 0432)       Physical Exam:  Physical Exam  Constitutional:       Appearance: Normal appearance.   HENT:      Head: Normocephalic.      Nose: Nose normal.      Mouth/Throat:      Mouth: Mucous membranes are moist.   Eyes:      Conjunctiva/sclera:  Conjunctivae normal.   Cardiovascular:      Rate and Rhythm: Normal rate and regular rhythm.      Heart sounds: No murmur heard.  Pulmonary:      Effort: Pulmonary effort is normal.      Breath sounds: Normal breath sounds.   Abdominal:      Palpations: Abdomen is soft.   Musculoskeletal:         General: Normal range of motion.      Right lower leg: No edema.      Left lower leg: No edema.   Skin:     General: Skin is warm and dry.   Neurological:      General: No focal deficit present.      Mental Status: He is alert. Mental status is at baseline.   Psychiatric:         Mood and Affect: Mood normal.         Behavior: Behavior normal.            Assessment/Plan   Shabbir Laurent is a 74 yo male with a PMH of CAD s/p Single vessel bypass, Aortic dysfunction s/p mechanical aortic valve both in 2003, HFpEF, HTN, HLD, AUBREY w/ CPAP, DM Type II who was admitted for NSTEMI. Most recent LHC in 2012 with 95% LAD tx with PTCA and PCI, 80% circ s/p PCI, distal RCA 95%, VG to RCA graft not patent.        #NSTEMI w/ hx of CAD s/p single vessel bypass VG to RCA in 2003  #HTN  #HLD  #Chronic diastolic heart failure      -INR 2.7 >2.4> 2.1 >1.6   -NPO Sunday for LHC Monday. Hold Warfarin  -C/w Heparin gtt for ACS protocol, DAPT w/ ASA, Brilinta   -Repeat echocardiogram recommended  -Continue Jardiance, lasix, Losartan, Metoprolol as previously ordered  -Will follow     Peripheral IV 20 G Left;Dorsal Forearm (Active)   Site Assessment Clean;Dry;Intact 04/20/24 2013   Line Status Infusing 04/20/24 2013   Dressing Status Clean;Dry 04/20/24 2013   Number of days:        Code Status:  Full Code    I spent  minutes in the professional and overall care of this patient.        Keren Holguin PA-C

## 2024-04-22 ENCOUNTER — APPOINTMENT (OUTPATIENT)
Dept: CARDIOLOGY | Facility: HOSPITAL | Age: 74
DRG: 322 | End: 2024-04-22
Payer: MEDICARE

## 2024-04-22 LAB
ALBUMIN SERPL BCP-MCNC: 3.7 G/DL (ref 3.4–5)
ANION GAP SERPL CALC-SCNC: 15 MMOL/L (ref 10–20)
AORTIC VALVE MEAN GRADIENT: 12.8 MMHG
AORTIC VALVE PEAK VELOCITY: 2.48 M/S
AV PEAK GRADIENT: 24.7 MMHG
BUN SERPL-MCNC: 18 MG/DL (ref 6–23)
CALCIUM SERPL-MCNC: 9 MG/DL (ref 8.6–10.3)
CHLORIDE SERPL-SCNC: 105 MMOL/L (ref 98–107)
CO2 SERPL-SCNC: 22 MMOL/L (ref 21–32)
CREAT SERPL-MCNC: 0.97 MG/DL (ref 0.5–1.3)
EGFRCR SERPLBLD CKD-EPI 2021: 82 ML/MIN/1.73M*2
EJECTION FRACTION APICAL 4 CHAMBER: 53.2
ERYTHROCYTE [DISTWIDTH] IN BLOOD BY AUTOMATED COUNT: 13.3 % (ref 11.5–14.5)
GLUCOSE BLD MANUAL STRIP-MCNC: 124 MG/DL (ref 74–99)
GLUCOSE BLD MANUAL STRIP-MCNC: 150 MG/DL (ref 74–99)
GLUCOSE BLD MANUAL STRIP-MCNC: 175 MG/DL (ref 74–99)
GLUCOSE BLD MANUAL STRIP-MCNC: 181 MG/DL (ref 74–99)
GLUCOSE SERPL-MCNC: 140 MG/DL (ref 74–99)
HCT VFR BLD AUTO: 42.6 % (ref 41–52)
HGB BLD-MCNC: 14.1 G/DL (ref 13.5–17.5)
INR PPP: 1.3 (ref 0.9–1.1)
LEFT ATRIUM VOLUME AREA LENGTH INDEX BSA: 20.3 ML/M2
LEFT VENTRICLE INTERNAL DIMENSION DIASTOLE: 4.96 CM (ref 3.5–6)
LEFT VENTRICULAR OUTFLOW TRACT DIAMETER: 2.2 CM
LV EJECTION FRACTION BIPLANE: 55 %
MAGNESIUM SERPL-MCNC: 1.95 MG/DL (ref 1.6–2.4)
MCH RBC QN AUTO: 30.5 PG (ref 26–34)
MCHC RBC AUTO-ENTMCNC: 33.1 G/DL (ref 32–36)
MCV RBC AUTO: 92 FL (ref 80–100)
MITRAL VALVE E/A RATIO: 0.67
MITRAL VALVE E/E' RATIO: 9.86
NRBC BLD-RTO: 0 /100 WBCS (ref 0–0)
PHOSPHATE SERPL-MCNC: 3.2 MG/DL (ref 2.5–4.9)
PLATELET # BLD AUTO: 271 X10*3/UL (ref 150–450)
POTASSIUM SERPL-SCNC: 3.7 MMOL/L (ref 3.5–5.3)
PROTHROMBIN TIME: 15.1 SECONDS (ref 9.8–12.8)
RBC # BLD AUTO: 4.62 X10*6/UL (ref 4.5–5.9)
RIGHT VENTRICLE FREE WALL PEAK S': 9 CM/S
RIGHT VENTRICLE PEAK SYSTOLIC PRESSURE: 20.5 MMHG
SODIUM SERPL-SCNC: 138 MMOL/L (ref 136–145)
TRICUSPID ANNULAR PLANE SYSTOLIC EXCURSION: 1.2 CM
UFH PPP CHRO-ACNC: 0.4 IU/ML
WBC # BLD AUTO: 4.7 X10*3/UL (ref 4.4–11.3)

## 2024-04-22 PROCEDURE — C1725 CATH, TRANSLUMIN NON-LASER: HCPCS | Performed by: INTERNAL MEDICINE

## 2024-04-22 PROCEDURE — 99231 SBSQ HOSP IP/OBS SF/LOW 25: CPT

## 2024-04-22 PROCEDURE — 92928 PRQ TCAT PLMT NTRAC ST 1 LES: CPT | Performed by: INTERNAL MEDICINE

## 2024-04-22 PROCEDURE — C1760 CLOSURE DEV, VASC: HCPCS | Performed by: INTERNAL MEDICINE

## 2024-04-22 PROCEDURE — 93458 L HRT ARTERY/VENTRICLE ANGIO: CPT | Performed by: INTERNAL MEDICINE

## 2024-04-22 PROCEDURE — 99153 MOD SED SAME PHYS/QHP EA: CPT | Performed by: INTERNAL MEDICINE

## 2024-04-22 PROCEDURE — 2780000003 HC OR 278 NO HCPCS: Performed by: INTERNAL MEDICINE

## 2024-04-22 PROCEDURE — C1887 CATHETER, GUIDING: HCPCS | Performed by: INTERNAL MEDICINE

## 2024-04-22 PROCEDURE — 2500000006 HC RX 250 W HCPCS SELF ADMINISTERED DRUGS (ALT 637 FOR ALL PAYERS)

## 2024-04-22 PROCEDURE — 85347 COAGULATION TIME ACTIVATED: CPT | Performed by: INTERNAL MEDICINE

## 2024-04-22 PROCEDURE — 80069 RENAL FUNCTION PANEL: CPT

## 2024-04-22 PROCEDURE — B2111ZZ FLUOROSCOPY OF MULTIPLE CORONARY ARTERIES USING LOW OSMOLAR CONTRAST: ICD-10-PCS | Performed by: INTERNAL MEDICINE

## 2024-04-22 PROCEDURE — 85027 COMPLETE CBC AUTOMATED: CPT

## 2024-04-22 PROCEDURE — 2500000001 HC RX 250 WO HCPCS SELF ADMINISTERED DRUGS (ALT 637 FOR MEDICARE OP)

## 2024-04-22 PROCEDURE — 2500000004 HC RX 250 GENERAL PHARMACY W/ HCPCS (ALT 636 FOR OP/ED)

## 2024-04-22 PROCEDURE — 92921 PR PRQ TRLUML CORONARY ANGIOPLASTY ADDL BRANCH: CPT | Performed by: INTERNAL MEDICINE

## 2024-04-22 PROCEDURE — 99232 SBSQ HOSP IP/OBS MODERATE 35: CPT | Performed by: PHYSICIAN ASSISTANT

## 2024-04-22 PROCEDURE — C1874 STENT, COATED/COV W/DEL SYS: HCPCS | Performed by: INTERNAL MEDICINE

## 2024-04-22 PROCEDURE — 99152 MOD SED SAME PHYS/QHP 5/>YRS: CPT | Performed by: INTERNAL MEDICINE

## 2024-04-22 PROCEDURE — 4A023N7 MEASUREMENT OF CARDIAC SAMPLING AND PRESSURE, LEFT HEART, PERCUTANEOUS APPROACH: ICD-10-PCS | Performed by: INTERNAL MEDICINE

## 2024-04-22 PROCEDURE — 2500000005 HC RX 250 GENERAL PHARMACY W/O HCPCS: Performed by: INTERNAL MEDICINE

## 2024-04-22 PROCEDURE — C9600 PERC DRUG-EL COR STENT SING: HCPCS | Performed by: INTERNAL MEDICINE

## 2024-04-22 PROCEDURE — 1200000002 HC GENERAL ROOM WITH TELEMETRY DAILY

## 2024-04-22 PROCEDURE — 2720000007 HC OR 272 NO HCPCS: Performed by: INTERNAL MEDICINE

## 2024-04-22 PROCEDURE — 7100000009 HC PHASE TWO TIME - INITIAL BASE CHARGE: Performed by: INTERNAL MEDICINE

## 2024-04-22 PROCEDURE — 2550000001 HC RX 255 CONTRASTS: Performed by: INTERNAL MEDICINE

## 2024-04-22 PROCEDURE — 2500000004 HC RX 250 GENERAL PHARMACY W/ HCPCS (ALT 636 FOR OP/ED): Performed by: INTERNAL MEDICINE

## 2024-04-22 PROCEDURE — C1894 INTRO/SHEATH, NON-LASER: HCPCS | Performed by: INTERNAL MEDICINE

## 2024-04-22 PROCEDURE — 93306 TTE W/DOPPLER COMPLETE: CPT

## 2024-04-22 PROCEDURE — 027135Z DILATION OF CORONARY ARTERY, TWO ARTERIES WITH TWO DRUG-ELUTING INTRALUMINAL DEVICES, PERCUTANEOUS APPROACH: ICD-10-PCS | Performed by: INTERNAL MEDICINE

## 2024-04-22 PROCEDURE — 83735 ASSAY OF MAGNESIUM: CPT

## 2024-04-22 PROCEDURE — 7100000010 HC PHASE TWO TIME - EACH INCREMENTAL 1 MINUTE: Performed by: INTERNAL MEDICINE

## 2024-04-22 PROCEDURE — 82947 ASSAY GLUCOSE BLOOD QUANT: CPT | Mod: 91

## 2024-04-22 PROCEDURE — 93005 ELECTROCARDIOGRAM TRACING: CPT

## 2024-04-22 PROCEDURE — 94660 CPAP INITIATION&MGMT: CPT

## 2024-04-22 PROCEDURE — 93010 ELECTROCARDIOGRAM REPORT: CPT | Performed by: INTERNAL MEDICINE

## 2024-04-22 PROCEDURE — 36415 COLL VENOUS BLD VENIPUNCTURE: CPT

## 2024-04-22 PROCEDURE — 85610 PROTHROMBIN TIME: CPT

## 2024-04-22 PROCEDURE — 85520 HEPARIN ASSAY: CPT

## 2024-04-22 PROCEDURE — C1769 GUIDE WIRE: HCPCS | Performed by: INTERNAL MEDICINE

## 2024-04-22 DEVICE — STENT ONYXNG30018UX ONYX 3.00X18RX
Type: IMPLANTABLE DEVICE | Site: HEART | Status: FUNCTIONAL
Brand: ONYX FRONTIER™

## 2024-04-22 DEVICE — STENT ONYXNG25022UX ONYX 2.50X22RX
Type: IMPLANTABLE DEVICE | Site: HEART | Status: FUNCTIONAL
Brand: ONYX FRONTIER™

## 2024-04-22 RX ORDER — MIDAZOLAM HYDROCHLORIDE 1 MG/ML
INJECTION, SOLUTION INTRAMUSCULAR; INTRAVENOUS AS NEEDED
Status: DISCONTINUED | OUTPATIENT
Start: 2024-04-22 | End: 2024-04-22 | Stop reason: HOSPADM

## 2024-04-22 RX ORDER — LIDOCAINE HYDROCHLORIDE 20 MG/ML
INJECTION, SOLUTION INFILTRATION; PERINEURAL AS NEEDED
Status: DISCONTINUED | OUTPATIENT
Start: 2024-04-22 | End: 2024-04-22 | Stop reason: HOSPADM

## 2024-04-22 RX ORDER — SODIUM CHLORIDE 9 MG/ML
1.5 INJECTION, SOLUTION INTRAVENOUS CONTINUOUS
Status: ACTIVE | OUTPATIENT
Start: 2024-04-22 | End: 2024-04-22

## 2024-04-22 RX ORDER — FENTANYL CITRATE 50 UG/ML
INJECTION, SOLUTION INTRAMUSCULAR; INTRAVENOUS AS NEEDED
Status: DISCONTINUED | OUTPATIENT
Start: 2024-04-22 | End: 2024-04-22 | Stop reason: HOSPADM

## 2024-04-22 RX ORDER — HEPARIN SODIUM 1000 [USP'U]/ML
INJECTION, SOLUTION INTRAVENOUS; SUBCUTANEOUS AS NEEDED
Status: DISCONTINUED | OUTPATIENT
Start: 2024-04-22 | End: 2024-04-22 | Stop reason: HOSPADM

## 2024-04-22 RX ORDER — NITROGLYCERIN 40 MG/100ML
INJECTION INTRAVENOUS AS NEEDED
Status: DISCONTINUED | OUTPATIENT
Start: 2024-04-22 | End: 2024-04-22 | Stop reason: HOSPADM

## 2024-04-22 RX ORDER — VERAPAMIL HYDROCHLORIDE 2.5 MG/ML
INJECTION, SOLUTION INTRAVENOUS AS NEEDED
Status: DISCONTINUED | OUTPATIENT
Start: 2024-04-22 | End: 2024-04-22 | Stop reason: HOSPADM

## 2024-04-22 RX ADMIN — ASPIRIN 81 MG: 81 TABLET, COATED ORAL at 09:46

## 2024-04-22 RX ADMIN — LEFLUNOMIDE 20 MG: 10 TABLET, FILM COATED ORAL at 09:47

## 2024-04-22 RX ADMIN — METOPROLOL TARTRATE 25 MG: 25 TABLET, FILM COATED ORAL at 09:46

## 2024-04-22 RX ADMIN — LOSARTAN POTASSIUM 100 MG: 50 TABLET, FILM COATED ORAL at 09:46

## 2024-04-22 RX ADMIN — FOLIC ACID 1 MG: 1 TABLET ORAL at 09:47

## 2024-04-22 RX ADMIN — TICAGRELOR 90 MG: 90 TABLET ORAL at 09:47

## 2024-04-22 RX ADMIN — LEVOTHYROXINE SODIUM 137 MCG: 137 TABLET ORAL at 09:46

## 2024-04-22 RX ADMIN — METOPROLOL TARTRATE 25 MG: 25 TABLET, FILM COATED ORAL at 20:20

## 2024-04-22 RX ADMIN — EMPAGLIFLOZIN 25 MG: 10 TABLET, FILM COATED ORAL at 09:46

## 2024-04-22 RX ADMIN — Medication 2000 UNITS: at 09:46

## 2024-04-22 RX ADMIN — LORATADINE 10 MG: 10 TABLET ORAL at 09:47

## 2024-04-22 RX ADMIN — HEPARIN SODIUM 1800 UNITS/HR: 10000 INJECTION, SOLUTION INTRAVENOUS at 08:03

## 2024-04-22 RX ADMIN — PREGABALIN 200 MG: 75 CAPSULE ORAL at 20:20

## 2024-04-22 RX ADMIN — ATORVASTATIN CALCIUM 40 MG: 40 TABLET, FILM COATED ORAL at 20:20

## 2024-04-22 RX ADMIN — FLUOXETINE HYDROCHLORIDE 20 MG: 20 CAPSULE ORAL at 09:46

## 2024-04-22 RX ADMIN — Medication 3 MG: at 20:20

## 2024-04-22 RX ADMIN — SODIUM CHLORIDE 1.5 ML/KG/HR: 9 INJECTION, SOLUTION INTRAVENOUS at 18:04

## 2024-04-22 RX ADMIN — FUROSEMIDE 20 MG: 40 TABLET ORAL at 09:47

## 2024-04-22 RX ADMIN — TICAGRELOR 90 MG: 90 TABLET ORAL at 20:20

## 2024-04-22 ASSESSMENT — COGNITIVE AND FUNCTIONAL STATUS - GENERAL
DAILY ACTIVITIY SCORE: 24
MOBILITY SCORE: 24
DAILY ACTIVITIY SCORE: 24
MOBILITY SCORE: 24

## 2024-04-22 ASSESSMENT — PAIN - FUNCTIONAL ASSESSMENT: PAIN_FUNCTIONAL_ASSESSMENT: 0-10

## 2024-04-22 ASSESSMENT — PAIN SCALES - GENERAL
PAINLEVEL_OUTOF10: 0 - NO PAIN

## 2024-04-22 NOTE — PROGRESS NOTES
Subjective Data:  Doing well. Patient seen post PCI.   Denies any chest pain, chest pressure, palpitations, dizziness, cough, shortness of breath, orthopnea, edema.      Overnight Events:    No acute issues reported overnight.      Objective Data:  Last Recorded Vitals:  Vitals:    04/21/24 2300 04/22/24 0300 04/22/24 0512 04/22/24 0944   BP:   124/63 136/83   BP Location:   Right arm Right arm   Patient Position:   Lying Sitting   Pulse:   58 76   Resp: 16 17 18 16   Temp:   35.8 °C (96.5 °F) 36.4 °C (97.6 °F)   TempSrc:   Temporal Temporal   SpO2: 96% 95% 96% 96%   Weight:       Height:           Last Labs:  CBC - 4/22/2024:  5:44 AM  4.7 14.1 271    42.6      CMP - 4/22/2024:  5:44 AM  9.0 7.5 47 --- 0.9   3.2 3.7 30 87      PTT - 4/20/2024:  5:42 AM  1.3   15.1 68     TROPHS   Date/Time Value Ref Range Status   04/18/2024 02:50 PM 6,116 0 - 20 ng/L Final     Comment:     Previous result verified on 4/18/2024 1259 on specimen/case [id number] called with component TRPHS for procedure Troponin I, High Sensitivity, Initial with value 6,868 ng/L.   04/18/2024 01:11 PM 6,619 0 - 20 ng/L Final     Comment:     Previous result verified on 4/18/2024 1259 on specimen/case [id number] called with component TRPHS for procedure Troponin I, High Sensitivity, Initial with value 6,868 ng/L.   04/18/2024 12:03 PM 6,868 0 - 20 ng/L Final     BNP   Date/Time Value Ref Range Status   04/18/2024 12:03  0 - 99 pg/mL Final     HGBA1C   Date/Time Value Ref Range Status   10/04/2023 10:41 AM 6.7 4.2 - 6.5 % Final   05/03/2023 12:12 PM 6.3 4.2 - 6.5 % Final     LDLCALC   Date/Time Value Ref Range Status   10/04/2023 09:58 AM   Final     Comment:     The calculation of LDL and VLDL are inaccurate when the Triglycerides are greater than 400 mg/dL or when the patient is non-fasting. If LDL measurement is necessary contact the testing laboratory for an alternative LDL assay.                                  Near   Borderline       AGE      Desirable  Optimal    High     High     Very High     0-19 Y     0 - 109     ---    110-129   >/= 130     ----    20-24 Y     0 - 119     ---    120-159   >/= 160     ----      >24 Y     0 -  99   100-129  130-159   160-189     >/=190       VLDL   Date/Time Value Ref Range Status   10/04/2023 09:58 AM   Final     Comment:     Unable to calculate VLDL.   05/03/2023 11:17 AM SEE COMMENT 0 - 40 mg/dL Final     Comment:       Unable to calculate VLDL.   05/19/2022 09:01 AM SEE COMMENT 0 - 40 mg/dL Final     Comment:       Unable to calculate VLDL.   05/06/2021 11:34 AM 67 0 - 40 mg/dL Final      Last I/O:  I/O last 3 completed shifts:  In: 3006.1 (24.1 mL/kg) [P.O.:1960; I.V.:1046.1 (8.4 mL/kg)]  Out: - (0 mL/kg)   Weight: 124.7 kg     Past Cardiology Tests (Last 3 Years):  EKG:  ECG 12 lead 04/18/2024:      Echo:  Transthoracic Echo (TTE) Complete 11/10/2023  CONCLUSIONS:   1. Left ventricular systolic function is normal with a 60-65% estimated ejection fraction.   2. Abnormal septal motion consistent with left bundle branch block.   3. Spectral Doppler shows an impaired relaxation pattern of left ventricular diastolic filling.   4. There is moderate left ventricular hypertrophy.   5. There is mildly reduced right ventricular systolic function.   6. The left atrium is moderately dilated.   7. The right atrium is moderately dilated.   8. Mild aortic valve regurgitation.   9. Normally functioning mechanical aortic valve with normal hemodynamics.  10. Prosthetic graft in the aortic position measuring 4.5 cm.     Transthoracic Echocardiogram (11/11/2022):   CONCLUSIONS:   1. Left ventricular systolic function is low normal with a 50-55% estimated ejection fraction.   2. Spectral Doppler shows an impaired relaxation pattern of left ventricular diastolic filling.   3. There is moderate left ventricular hypertrophy.   4. Mechanical aortic valve functioning normally.   5. Prosthetic graft in the ascending aorta  position.   6. There is global hypokinesis of the left ventricle with minor regional variations.     Transthoracic Echocardiogram (05/07/2021):   CONCLUSIONS:   1. The left ventricular systolic function is low normal with a 50-55% estimated ejection fraction.   2. There is moderate left ventricular hypertrophy.   3. The left atrium is moderately dilated.   4. Mechanical aortic valve functioning normally.   5. There is mild aortic valve regurgitation.   6. Ascending aorta measures 4.1 cm.     Transthoracic Echocardiogram (05/15/2020):  CONCLUSIONS:   1. The left ventricular systolic function is low normal with a 50-55% estimated ejection fraction.   2. Spectral Doppler shows an impaired relaxation pattern of left ventricular diastolic filling.   3. There is moderate left ventricular hypertrophy.   4. The left atrium is mild to moderately dilated.   5. Bioprosthetic aortic valve with a peak gradient of 34 mmHg and a mean gradient of 20 mmHg.   6. There is mild aortic valve regurgitation.   7. Ascending aorta measures 4.2 cm.   8. In comparison to the record of 5/10/2019 aortic valve gradients are mildly increased.     Transthoracic Echocardiogram (05/10/2019):   CONCLUSIONS:   1. The left ventricular systolic function is normal with a 55-60% estimated ejection fraction.   2. Spectral Doppler shows an impaired relaxation pattern of left ventricular diastolic filling.   3. There is moderate left ventricular hypertrophy.   4. The left atrium is moderately dilated.   5. Bioprosthetic aortic valve with normal valvular function.   6. There is mild aortic valve regurgitation.   7. Aortic valve velocities are unchanged in comparison to the record of 5/18/18/.        Ejection Fractions:            EF   Date/Time Value Ref Range Status   11/10/2023 10:59 AM 65          Cath:  [facility] (2012): This demonstrated 95% left anterior descending treated with balloon angioplasty and stent placement, 80% circumflex treated with balloon  angioplasty and stent placement and a distal 95% right coronary artery supplying a small posterolateral branch. The vein graft to the distal right coronary artery was occluded.     Stress Test:  No results found for this or any previous visit from the past 1095 days.     Imaging:  CT Angio Chest for PE (04/18/2024):   IMPRESSION:  1. No pulmonary embolism.  2. Chronic interstitial changes with no acute process.     CXR (04/18/2024):   IMPRESSION:  No acute cardiopulmonary process.    Inpatient Medications:  Scheduled medications   Medication Dose Route Frequency    aspirin  81 mg oral Daily    atorvastatin  40 mg oral Nightly    cholecalciferol  2,000 Units oral Daily    empagliflozin  25 mg oral Daily    FLUoxetine  20 mg oral Daily    folic acid  1 mg oral Daily    furosemide  20 mg oral Once per day on Monday Wednesday Friday    insulin lispro  0-10 Units subcutaneous TID with meals    leflunomide  20 mg oral Daily    levothyroxine  137 mcg oral Daily    loratadine  10 mg oral Daily    losartan  100 mg oral Daily    metoprolol tartrate  25 mg oral BID    polyethylene glycol  17 g oral Daily    pregabalin  200 mg oral Nightly    ticagrelor  90 mg oral BID    [Held by provider] warfarin  5 mg oral Once per day on Tuesday Friday    [Held by provider] warfarin  7.5 mg oral Once per day on Sunday Monday Wednesday Thursday Saturday     PRN medications   Medication    acetaminophen    dextrose    dextrose    glucagon    glucagon    heparin    HYDROcodone-acetaminophen    HYDROmorphone    loperamide    melatonin    nitroglycerin     Continuous Medications   Medication Dose Last Rate    heparin  0-4,000 Units/hr 1,800 Units/hr (04/22/24 0803)       Physical Exam:  Physical Exam  Constitutional:       Appearance: Normal appearance.   HENT:      Head: Normocephalic.      Nose: Nose normal.      Mouth/Throat:      Mouth: Mucous membranes are moist.   Eyes:      Conjunctiva/sclera: Conjunctivae normal.   Cardiovascular:       Rate and Rhythm: Normal rate and regular rhythm.      Heart sounds: No murmur heard.  Pulmonary:      Effort: Pulmonary effort is normal.      Breath sounds: Normal breath sounds.   Abdominal:      Palpations: Abdomen is soft.   Musculoskeletal:      Right lower leg: No edema.      Left lower leg: No edema.   Skin:     General: Skin is warm and dry.   Neurological:      General: No focal deficit present.      Mental Status: He is alert. Mental status is at baseline.   Psychiatric:         Mood and Affect: Mood normal.         Behavior: Behavior normal.            Assessment/Plan   Shabbir Laurent is a 72 yo male with a PMH of CAD s/p Single vessel bypass, Aortic dysfunction s/p mechanical aortic valve both in 2003, HFpEF, HTN, HLD, AUBREY w/ CPAP, DM Type II who was admitted for NSTEMI. Most recent LHC in 2012 with 95% LAD tx with PTCA and PCI, 80% circ s/p PCI, distal RCA 95%, VG to RCA graft not patent.        #NSTEMI w/ hx of CAD s/p single vessel bypass VG to RCA in 2003  #HTN  #HLD  #Chronic diastolic heart failure      -INR 2.7 >2.4> 2.1 >1.6   -Holding Warfarin  -[facility] with PCI x2  -Can d/c Heparin gtt  -C/w DAPT w/ ASA, Brilinta   -Repeat echocardiogram recommended, reviewed with normal LVSF, mod concentric LVH  -Continue Jardiance, Lasix, Losartan, Metoprolol as previously ordered  -Will follow     Peripheral IV 20 G Left;Dorsal Forearm (Active)   Site Assessment Clean;Dry;Intact 04/21/24 2022   Line Status Infusing 04/21/24 2022   Dressing Status Clean;Dry 04/21/24 2022   Number of days:        Code Status:  Full Code    I spent  minutes in the professional and overall care of this patient.        Keren Holguin PA-C

## 2024-04-22 NOTE — PROGRESS NOTES
Patient: Shabbir Laurent  [facility]  Admitted on: 4/18/2024    Age: 73 y.o.   Gender: male  Code Status:  Full Code   Admitting Dx: NSTEMI (non-ST elevated myocardial infarction) (Multi) [I21.4]    MRN: [id number]  PCP: Jean Polanco DO  Preferred Pharm: [unfilled]    Attending: [unfilled]  Resident: Kashif Ellis DO  TCC: No care team member to display      Overnight Events     No acute events overnight.    Subjective   Patient seen and examined when he returned to room likely from echo.  He is doing well. No complaints. Aware of plan LHC. Family at bedside aware of plan.    Objective    Physical Exam   Constitutional: Appears stated age. In NAD.   HEENT: NC/AT, EOMI, clear sclera, moist mucous membranes  CV: RRR, Systolic click  PULM: CTAB, no coughing or wheezing  ABDOMEN: Soft, NT/ND. No TTP. + BSx4.  SKIN: Normal Color, Warm, Dry, No Rashes   EXTREMITIES: Non-Tender, Full ROM, No Pedal Edema  NEURO: A&O x 4, nonfocal neurological exam.  PSYCH: Normal Mood & Behavior    Temp:  [35.6 °C (96.1 °F)-36.4 °C (97.6 °F)] 36.4 °C (97.6 °F)  Heart Rate:  [58-84] 84  Resp:  [14-18] 14  BP: (118-139)/(63-93) 135/93      Intake/Output Summary (Last 24 hours) at 4/22/2024 1441  Last data filed at 4/22/2024 0500  Gross per 24 hour   Intake 920.4 ml   Output --   Net 920.4 ml       Vitals:    04/18/24 1146   Weight: 125 kg (275 lb)             I/Os    Intake/Output Summary (Last 24 hours) at 4/22/2024 1441  Last data filed at 4/22/2024 0500  Gross per 24 hour   Intake 920.4 ml   Output --   Net 920.4 ml       Labs:   Results from last 72 hours   Lab Units 04/22/24  0544 04/21/24  0538 04/20/24  0542   SODIUM mmol/L 138 140 138   POTASSIUM mmol/L 3.7 3.3* 3.6   CHLORIDE mmol/L 105 105 103   CO2 mmol/L 22 23 25   BUN mg/dL 18 21 22   CREATININE mg/dL 0.97 1.01 1.09   GLUCOSE mg/dL 140* 142* 154*   CALCIUM mg/dL 9.0 8.9 8.9   ANION GAP mmol/L 15 15 14   EGFR mL/min/1.73m*2 82 79 72   PHOSPHORUS  "mg/dL 3.2 3.6 3.1      Results from last 72 hours   Lab Units 04/22/24  0544 04/21/24  0538 04/20/24  0542   WBC AUTO x10*3/uL 4.7 5.0 6.7   HEMOGLOBIN g/dL 14.1 14.2 14.0   HEMATOCRIT % 42.6 42.6 43.0   PLATELETS AUTO x10*3/uL 271 256 252      Lab Results   Component Value Date    CALCIUM 9.0 04/22/2024    PHOS 3.2 04/22/2024      Lab Results   Component Value Date    CRP 0.54 03/18/2024      [unfilled]     Micro/ID:   No results found for the last 90 days.                   No lab exists for component: \"AGALPCRNB\"   .ID  No results found for: \"URINECULTURE\", \"BLOODCULT\", \"CSFCULTSMEAR\"    Images:       Meds    Scheduled medications  aspirin, 81 mg, oral, Daily  atorvastatin, 40 mg, oral, Nightly  cholecalciferol, 2,000 Units, oral, Daily  empagliflozin, 25 mg, oral, Daily  FLUoxetine, 20 mg, oral, Daily  folic acid, 1 mg, oral, Daily  furosemide, 20 mg, oral, Once per day on Monday Wednesday Friday  insulin lispro, 0-10 Units, subcutaneous, TID with meals  leflunomide, 20 mg, oral, Daily  levothyroxine, 137 mcg, oral, Daily  loratadine, 10 mg, oral, Daily  losartan, 100 mg, oral, Daily  metoprolol tartrate, 25 mg, oral, BID  polyethylene glycol, 17 g, oral, Daily  pregabalin, 200 mg, oral, Nightly  ticagrelor, 90 mg, oral, BID  [Held by provider] warfarin, 5 mg, oral, Once per day on Tuesday Friday  [Held by provider] warfarin, 7.5 mg, oral, Once per day on Sunday Monday Wednesday Thursday Saturday      Continuous medications  heparin, 0-4,000 Units/hr, Last Rate: Stopped (04/22/24 1341)      PRN medications  PRN medications: acetaminophen, dextrose, dextrose, fentaNYL PF, glucagon, glucagon, heparin, heparin, HYDROcodone-acetaminophen, HYDROmorphone, lidocaine, loperamide, melatonin, midazolam, nitroglycerin, nitroglycerin, verapamil     Assessment and Plan    Shabbir Laurent is a 73 y.o. male with PMH of  mechanical aortic valve on warfarin, open heart surgery 2003, CABG 2012, CHF, HTN, HLD, AUBREY (uses " CPAP), and T2DM, who presented to the hospital for diaphoresis and generalized weakness 2/2 NSTEMI. Heparin drip. Cardiology consulted. Mabel performing heart cath today.        Acute medical issues  #NSTEMI  # CAD  - Hold warfarin while anticipating heart cath for 4/22, INR improving  -EKG demonstrated ST depression in anterior leads and ST inversionis in the inferior leads.  -CT angio demonstrated no PE  - Continue Asprin, Brillinta, and Heparin drip  - continue jardiance lasix losartan, metoprolol   - Cardiology consulted, [facility] today  - Echo 4/22     Chronic medical issues  #CHF  - Lasix 20 mg MWF  -Jardiance 25 mg     #HTN  -Losartan 100 mg     #HLD  -Atorvastatin 40 mg     #Type 2 DM  - Lispro sliding scale     #Anxiety  - Prozac 20 mg     #Nutrition  -Folic acid 1 mg     #Chronic LBP  # seronegative inflammatory arthritis  -Leflunomide 20 mg  -Tylenol prn first line  -Norco 5-325 tid prn  -Pregablin 200 mg     #Hypothyroidism  -Synthroid 137 mg     #Nasal congestion  -Loratadine 10 mg     Fluids: IVF  Electrolytes: Replete as needed   Nutrition: NPO at midnight  GI Ppx: None  DVT Ppx: Heparin ggt  Oxygenation: RA  Antibiotics: None  Code: Full     Dispo: Shabbir Laurent is a 73 y.o. male who presented with NSTEMI. Heparin drip. Cardiology consulted. Left heart cath today.      Kashif Ellis DO

## 2024-04-22 NOTE — PROGRESS NOTES
04/22/24 1012   Discharge Planning   Living Arrangements Spouse/significant other;Children   Support Systems Spouse/significant other;Children   Assistance Needed X3, independent in ADLs, ambulates without assistive devices, drives, No O2 but has CPAP at home   Type of Residence Private residence   Number of Stairs to Enter Residence 3   Number of Stairs Within Residence 1   Do you have animals or pets at home? No   Who is requesting discharge planning? Provider   Home or Post Acute Services None   Patient expects to be discharged to: Home, no needs-denied need for HHC

## 2024-04-23 ENCOUNTER — PHARMACY VISIT (OUTPATIENT)
Dept: PHARMACY | Facility: CLINIC | Age: 74
End: 2024-04-23
Payer: COMMERCIAL

## 2024-04-23 VITALS
HEIGHT: 72 IN | DIASTOLIC BLOOD PRESSURE: 75 MMHG | SYSTOLIC BLOOD PRESSURE: 126 MMHG | RESPIRATION RATE: 18 BRPM | BODY MASS INDEX: 37.25 KG/M2 | TEMPERATURE: 96.8 F | OXYGEN SATURATION: 93 % | WEIGHT: 275 LBS | HEART RATE: 70 BPM

## 2024-04-23 LAB
ALBUMIN SERPL BCP-MCNC: 3.8 G/DL (ref 3.4–5)
ANION GAP SERPL CALC-SCNC: 14 MMOL/L (ref 10–20)
BUN SERPL-MCNC: 17 MG/DL (ref 6–23)
CALCIUM SERPL-MCNC: 9 MG/DL (ref 8.6–10.3)
CHLORIDE SERPL-SCNC: 106 MMOL/L (ref 98–107)
CHOLEST SERPL-MCNC: 140 MG/DL (ref 0–199)
CHOLESTEROL/HDL RATIO: 4.3
CO2 SERPL-SCNC: 22 MMOL/L (ref 21–32)
CREAT SERPL-MCNC: 1.11 MG/DL (ref 0.5–1.3)
EGFRCR SERPLBLD CKD-EPI 2021: 70 ML/MIN/1.73M*2
ERYTHROCYTE [DISTWIDTH] IN BLOOD BY AUTOMATED COUNT: 13.7 % (ref 11.5–14.5)
GLUCOSE BLD MANUAL STRIP-MCNC: 154 MG/DL (ref 74–99)
GLUCOSE BLD MANUAL STRIP-MCNC: 190 MG/DL (ref 74–99)
GLUCOSE SERPL-MCNC: 149 MG/DL (ref 74–99)
HCT VFR BLD AUTO: 43.1 % (ref 41–52)
HDLC SERPL-MCNC: 32.8 MG/DL
HGB BLD-MCNC: 14 G/DL (ref 13.5–17.5)
INR PPP: 1.3 (ref 0.9–1.1)
LDLC SERPL CALC-MCNC: 59 MG/DL
MAGNESIUM SERPL-MCNC: 1.9 MG/DL (ref 1.6–2.4)
MCH RBC QN AUTO: 30.3 PG (ref 26–34)
MCHC RBC AUTO-ENTMCNC: 32.5 G/DL (ref 32–36)
MCV RBC AUTO: 93 FL (ref 80–100)
NON HDL CHOLESTEROL: 107 MG/DL (ref 0–149)
NRBC BLD-RTO: 0 /100 WBCS (ref 0–0)
PHOSPHATE SERPL-MCNC: 3.5 MG/DL (ref 2.5–4.9)
PLATELET # BLD AUTO: 265 X10*3/UL (ref 150–450)
POTASSIUM SERPL-SCNC: 3.5 MMOL/L (ref 3.5–5.3)
PROTHROMBIN TIME: 14.1 SECONDS (ref 9.8–12.8)
RBC # BLD AUTO: 4.62 X10*6/UL (ref 4.5–5.9)
SODIUM SERPL-SCNC: 138 MMOL/L (ref 136–145)
TRIGL SERPL-MCNC: 239 MG/DL (ref 0–149)
VLDL: 48 MG/DL (ref 0–40)
WBC # BLD AUTO: 6.3 X10*3/UL (ref 4.4–11.3)

## 2024-04-23 PROCEDURE — 99239 HOSP IP/OBS DSCHRG MGMT >30: CPT | Performed by: STUDENT IN AN ORGANIZED HEALTH CARE EDUCATION/TRAINING PROGRAM

## 2024-04-23 PROCEDURE — 80061 LIPID PANEL: CPT | Performed by: STUDENT IN AN ORGANIZED HEALTH CARE EDUCATION/TRAINING PROGRAM

## 2024-04-23 PROCEDURE — 36415 COLL VENOUS BLD VENIPUNCTURE: CPT

## 2024-04-23 PROCEDURE — 2500000006 HC RX 250 W HCPCS SELF ADMINISTERED DRUGS (ALT 637 FOR ALL PAYERS): Mod: MUE

## 2024-04-23 PROCEDURE — 2500000002 HC RX 250 W HCPCS SELF ADMINISTERED DRUGS (ALT 637 FOR MEDICARE OP, ALT 636 FOR OP/ED)

## 2024-04-23 PROCEDURE — 2500000004 HC RX 250 GENERAL PHARMACY W/ HCPCS (ALT 636 FOR OP/ED)

## 2024-04-23 PROCEDURE — 2500000001 HC RX 250 WO HCPCS SELF ADMINISTERED DRUGS (ALT 637 FOR MEDICARE OP)

## 2024-04-23 PROCEDURE — 85610 PROTHROMBIN TIME: CPT

## 2024-04-23 PROCEDURE — 83735 ASSAY OF MAGNESIUM: CPT

## 2024-04-23 PROCEDURE — 82947 ASSAY GLUCOSE BLOOD QUANT: CPT

## 2024-04-23 PROCEDURE — 80069 RENAL FUNCTION PANEL: CPT

## 2024-04-23 PROCEDURE — 85027 COMPLETE CBC AUTOMATED: CPT

## 2024-04-23 PROCEDURE — RXMED WILLOW AMBULATORY MEDICATION CHARGE

## 2024-04-23 PROCEDURE — 94660 CPAP INITIATION&MGMT: CPT

## 2024-04-23 RX ORDER — POLYETHYLENE GLYCOL 3350 17 G/17G
17 POWDER, FOR SOLUTION ORAL DAILY PRN
Status: DISCONTINUED | OUTPATIENT
Start: 2024-04-23 | End: 2024-04-23 | Stop reason: HOSPADM

## 2024-04-23 RX ORDER — ATORVASTATIN CALCIUM 40 MG/1
40 TABLET, FILM COATED ORAL NIGHTLY
Qty: 30 TABLET | Refills: 0 | Status: SHIPPED | OUTPATIENT
Start: 2024-04-23 | End: 2024-05-23

## 2024-04-23 RX ORDER — METOPROLOL TARTRATE 25 MG/1
25 TABLET, FILM COATED ORAL 2 TIMES DAILY
Qty: 60 TABLET | Refills: 0 | Status: SHIPPED | OUTPATIENT
Start: 2024-04-23 | End: 2024-05-08 | Stop reason: SDUPTHER

## 2024-04-23 RX ORDER — ASPIRIN 81 MG/1
81 TABLET ORAL DAILY
Qty: 30 TABLET | Refills: 0 | Status: SHIPPED | OUTPATIENT
Start: 2024-04-24 | End: 2024-05-24

## 2024-04-23 RX ORDER — ICOSAPENT ETHYL 1 G/1
2 CAPSULE ORAL
Status: DISCONTINUED | OUTPATIENT
Start: 2024-04-23 | End: 2024-04-23 | Stop reason: HOSPADM

## 2024-04-23 RX ADMIN — Medication 2000 UNITS: at 09:32

## 2024-04-23 RX ADMIN — LEVOTHYROXINE SODIUM 137 MCG: 137 TABLET ORAL at 09:34

## 2024-04-23 RX ADMIN — LEFLUNOMIDE 20 MG: 10 TABLET, FILM COATED ORAL at 09:33

## 2024-04-23 RX ADMIN — METOPROLOL TARTRATE 25 MG: 25 TABLET, FILM COATED ORAL at 09:33

## 2024-04-23 RX ADMIN — INSULIN LISPRO 2 UNITS: 100 INJECTION, SOLUTION INTRAVENOUS; SUBCUTANEOUS at 09:23

## 2024-04-23 RX ADMIN — EMPAGLIFLOZIN 25 MG: 10 TABLET, FILM COATED ORAL at 09:30

## 2024-04-23 RX ADMIN — INSULIN LISPRO 2 UNITS: 100 INJECTION, SOLUTION INTRAVENOUS; SUBCUTANEOUS at 11:39

## 2024-04-23 RX ADMIN — ASPIRIN 81 MG: 81 TABLET, COATED ORAL at 09:32

## 2024-04-23 RX ADMIN — LORATADINE 10 MG: 10 TABLET ORAL at 09:31

## 2024-04-23 RX ADMIN — FLUOXETINE HYDROCHLORIDE 20 MG: 20 CAPSULE ORAL at 09:31

## 2024-04-23 RX ADMIN — TICAGRELOR 90 MG: 90 TABLET ORAL at 09:33

## 2024-04-23 RX ADMIN — LOSARTAN POTASSIUM 100 MG: 50 TABLET, FILM COATED ORAL at 09:32

## 2024-04-23 RX ADMIN — FOLIC ACID 1 MG: 1 TABLET ORAL at 09:31

## 2024-04-23 ASSESSMENT — COGNITIVE AND FUNCTIONAL STATUS - GENERAL
MOBILITY SCORE: 24
DAILY ACTIVITIY SCORE: 24

## 2024-04-23 ASSESSMENT — PAIN SCALES - GENERAL: PAINLEVEL_OUTOF10: 0 - NO PAIN

## 2024-04-23 ASSESSMENT — PAIN - FUNCTIONAL ASSESSMENT: PAIN_FUNCTIONAL_ASSESSMENT: 0-10

## 2024-04-23 NOTE — NURSING NOTE
Discharge orders received, med rec complete, discharge education and post-cath precautions provided. Pt and family verbalized understanding, new home prescriptions filled by outpatient pharmacy. Pt left floor with belonging via wheelchair at 1500

## 2024-04-23 NOTE — DISCHARGE INSTRUCTIONS
1) Please, take your home medications as instructed after being discharged from the hospital.    New medications on discharge:  -Asprin 81 mg daily until INR between 2.5 and 3.5, then discontinue  -Take Brilinta 90 mg daily  - Metoprolol 25 mg daily  - Lipitor 40 mg daily    2) Please, follow-up with your primary care provider within 7 to 14 days after leaving the hospital. /appointment services has been requested to make an appointment for you, however if you do not hear back from them within 1 to 2 days, please call your primary physician's office to schedule an appointment. Bring your photo ID and insurance card to your appointment.   [facility]  services can be reached at [phone] and appointment services can be reached at [phone].    - Please, call   or appointment services and schedule a follow-up with your PCP within 1-2 weeks after you leave the hospital.  - Follow up with cardiologist Dr. Goins    3) If you experience any worsening symptoms or have any concerns, please contact your primary care provider to schedule an appointment. If you cannot get in touch with your primary care physician, please return to the nearest emergency room or urgent care clinic for an evaluation and treatment.    Thank you for choosing [facility] and allowing us to partake in your medical care!    - Your primary care inpatient team.

## 2024-04-23 NOTE — DISCHARGE SUMMARY
Discharge Diagnosis  NSTEMI (non-ST elevated myocardial infarction) (Multi)  CHF  T2DM    Issues Requiring Follow-Up  Subtheraputic INR      Discharge Meds     Your medication list        START taking these medications        Instructions Last Dose Given Next Dose Due   atorvastatin 40 mg tablet  Commonly known as: Lipitor      Take 1 tablet (40 mg) by mouth once daily at bedtime.       Brilinta 90 mg tablet  Generic drug: ticagrelor      Take 1 tablet (90 mg) by mouth 2 times a day.       metoprolol tartrate 25 mg tablet  Commonly known as: Lopressor      Take 1 tablet (25 mg) by mouth 2 times a day.              CHANGE how you take these medications        Instructions Last Dose Given Next Dose Due   aspirin 81 mg EC tablet  Start taking on: April 24, 2024  What changed:   when to take this  additional instructions      Take 1 tablet (81 mg) by mouth once daily. Continue until INR is in therapeutic range and then can discontinue.       FLUoxetine 20 mg capsule  Commonly known as: PROzac  What changed: when to take this      Take 1 capsule (20 mg) by mouth once daily.       folic acid 1 mg tablet  Commonly known as: Folvite  What changed: when to take this      Take 1 tablet (1 mg) by mouth once daily.       HYDROcodone-acetaminophen 5-325 mg tablet  Commonly known as: Norco  What changed:   additional instructions  Another medication with the same name was removed. Continue taking this medication, and follow the directions you see here.      Take 1 tablet by mouth 3 times a day as needed for moderate pain (4 - 6) for up to 28 days. Do not start before April 10, 2024.       pregabalin 200 mg capsule  Commonly known as: Lyrica  What changed: additional instructions      Take 1 capsule (200 mg) by mouth once daily at bedtime.       warfarin 5 mg tablet  Commonly known as: Coumadin  What changed: Another medication with the same name was changed. Make sure you understand how and when to take each.      Take as  directed. If you are unsure how to take this medication, talk to your nurse or doctor.       warfarin 5 mg tablet  Commonly known as: Coumadin  What changed:   how much to take  how to take this  when to take this  additional instructions      Take as directed. If you are unsure how to take this medication, talk to your nurse or doctor.  Original instructions: TAKE 1 AND 1/2 TABLETS BY MOUTH  5 TIMES WEEKLY AND 1 TABLET BY  MOUTH TWICE WEEKLY DAILY OR AS  DIRECTED AFTER INR TESTING. DO  NOT START BEFORE MARCH 11, 2023              CONTINUE taking these medications        Instructions Last Dose Given Next Dose Due   blood glucose control, normal solution           CENTRUM SILVER MEN ORAL           cetirizine 10 mg capsule  Commonly known as: ZYRTEC           cholecalciferol 50 mcg (2,000 unit) capsule  Commonly known as: Vitamin D-3           fluticasone propion-salmeteroL 250-50 mcg/dose diskus inhaler  Commonly known as: Advair Diskus      Inhale 1 puff 2 times a day. During summer (grass cutting)       furosemide 20 mg tablet  Commonly known as: Lasix           Jardiance 25 mg  Generic drug: empagliflozin      TAKE 1 TABLET BY MOUTH DAILY       Krill Oil (Omega 3 and 6) 1,500-165-67.5 mg capsule  Generic drug: [id number]           lancets 33 gauge misc      1 each by percutaneous route once daily. Test BS daily       leflunomide 20 mg tablet  Commonly known as: Arava      Take 1 tablet by mouth once daily       levothyroxine 137 mcg tablet  Commonly known as: Synthroid, Levoxyl      Take 1 tablet (137 mcg) by mouth once daily.       losartan 100 mg tablet  Commonly known as: Cozaar      Take 1 tablet (100 mg) by mouth once daily.       lubricating eye drops ophthalmic solution           metFORMIN 850 mg tablet  Commonly known as: Glucophage      Take 1 tablet (850 mg) by mouth 2 times a day with meals.       nitroglycerin 0.4 mg SL tablet  Commonly known as: Nitrostat           OneTouch Ultra  Test strip  Generic drug: blood sugar diagnostic      Test blood glucose once daily              STOP taking these medications      enoxaparin 120 mg/0.8 mL syringe  Commonly known as: Lovenox        rosuvastatin 20 mg tablet  Commonly known as: Crestor                  Where to Get Your Medications        These medications were sent to [facility]  [address] [place] [address]      Hours: 9 AM to 5 PM Mon-Fri Phone: [phone]   aspirin 81 mg EC tablet  atorvastatin 40 mg tablet  Brilinta 90 mg tablet  metoprolol tartrate 25 mg tablet         Test Results Pending At Discharge  Pending Labs       No current pending labs.            Hospital Course    ED course:   In the ED the patient was found to have  elevated troponin above 6000 which is down trending.  Patient was also hyponatremic with elevated TSH. FLU/COVID, CBC, and magnesium where within normal limits. EKG of patient demonstrated ST depression in anterior leads with ST inversion in the inferior leads. Patient underwent a CT angio which showed no evidence of PE.  CXR showed no acute process. INR was 2.7. Patient given 1L of LR and 324 mg of Asprin. Heparin drip was started    Hospital Course:  Shabbir Laurent is a 73 y.o. male with PMH of  mechanical aortic valve on warfarin, open heart surgery, CHF, HTN, HLD, AUBREY (uses CPAP), [id number], who presented to the hospital for diaphoresis and generalized weakness 2/2 NSTEMI. Heparin drip. Cardiology consulted. Dr. Monroe scheduled cath on mon 4/22 due to high INR 2.7.  Warfarin held. INR improving. Left heart cath 4/22  Patient received 2 stents.  For discharge follow up with Dr. Goins (cardiologist) and PCP.  Heparin discontinued and warfarin restarted with Asprin and birilinta.  Will discontinue aspirin once INR is therapeutic.    Pertinent Physical Exam At Time of Discharge  Physical Exam  Constitutional: Appears stated age. In NAD.   HEENT: NC/AT, EOMI, clear sclera, moist mucous  membranes  CV: RRR, Systolic click  PULM: CTAB, no coughing or wheezing  ABDOMEN: Soft, NT/ND. No TTP. + BSx4.  SKIN: Normal Color, Warm, Dry, No Rashes   EXTREMITIES: Non-Tender, Full ROM, No Pedal Edema  NEURO: A&O x 4, nonfocal neurological exam.  PSYCH: Normal Mood & Behavior      Outpatient Follow-Up  Future Appointments   Date Time Provider Department Center   5/8/2024 11:00 AM [facility] [id number] [facility]   5/22/2024  2:00 PM Jean Polanco DO [id number] [facility]   5/31/2024 10:45 AM Jose Juan Goins MD [id number] [place]   6/28/2024 10:45 AM MYRA Spangler-CNP [id number] [facility]   8/19/2024  1:40 PM Dominique Vance MD [id number] [facility]   1/8/2025 11:00 AM Roselyn Gamboa MD [id number] [facility]         Kashif Ellis DO

## 2024-04-23 NOTE — PROGRESS NOTES
Subjective Data:  Feels really good  Denies chest pain or sob  No le edema   No orthopnea  Cath site is normal       Objective Data:  Last Recorded Vitals:  Vitals:    04/22/24 1830 04/22/24 1943 04/22/24 2325 04/23/24 0603   BP: 154/84 133/80  119/78   BP Location:       Patient Position:       Pulse: 68 79  65   Resp: 16 20 18 19   Temp:  36 °C (96.8 °F)  36.8 °C (98.2 °F)   TempSrc:       SpO2: 96% 95%  95%   Weight:       Height:           Last Labs:  CBC - 4/23/2024:  5:50 AM  6.3 14.0 265    43.1      CMP - 4/23/2024:  5:50 AM  9.0 7.5 47 --- 0.9   3.5 3.8 30 87      PTT - 4/20/2024:  5:42 AM  1.3   14.1 68     TROPHS   Date/Time Value Ref Range Status   04/18/2024 02:50 PM 6,116 0 - 20 ng/L Final     Comment:     Previous result verified on 4/18/2024 1259 on specimen/case [id number] called with component TRPHS for procedure Troponin I, High Sensitivity, Initial with value 6,868 ng/L.   04/18/2024 01:11 PM 6,619 0 - 20 ng/L Final     Comment:     Previous result verified on 4/18/2024 1259 on specimen/case [id number] called with component TRPHS for procedure Troponin I, High Sensitivity, Initial with value 6,868 ng/L.   04/18/2024 12:03 PM 6,868 0 - 20 ng/L Final     BNP   Date/Time Value Ref Range Status   04/18/2024 12:03  0 - 99 pg/mL Final     HGBA1C   Date/Time Value Ref Range Status   10/04/2023 10:41 AM 6.7 4.2 - 6.5 % Final   05/03/2023 12:12 PM 6.3 4.2 - 6.5 % Final     LDLCALC   Date/Time Value Ref Range Status   10/04/2023 09:58 AM   Final     Comment:     The calculation of LDL and VLDL are inaccurate when the Triglycerides are greater than 400 mg/dL or when the patient is non-fasting. If LDL measurement is necessary contact the testing laboratory for an alternative LDL assay.                                  Near   Borderline      AGE      Desirable  Optimal    High     High     Very High     0-19 Y     0 - 109     ---    110-129   >/= 130     ----    20-24 Y     0 - 119     ---     120-159   >/= 160     ----      >24 Y     0 -  99   100-129  130-159   160-189     >/=190       VLDL   Date/Time Value Ref Range Status   10/04/2023 09:58 AM   Final     Comment:     Unable to calculate VLDL.   05/03/2023 11:17 AM SEE COMMENT 0 - 40 mg/dL Final     Comment:       Unable to calculate VLDL.   05/19/2022 09:01 AM SEE COMMENT 0 - 40 mg/dL Final     Comment:       Unable to calculate VLDL.   05/06/2021 11:34 AM 67 0 - 40 mg/dL Final      Last I/O:  I/O last 3 completed shifts:  In: 1628.3 (13.1 mL/kg) [P.O.:240; I.V.:1388.3 (11.1 mL/kg)]  Out: 5 (0 mL/kg) [Blood:5]  Weight: 124.7 kg     Past Cardiology Tests (Last 3 Years):  EKG:  ECG 12 Lead 04/22/2024 (Preliminary)      ECG 12 lead 04/18/2024    Echo:  Transthoracic Echo (TTE) Complete 04/22/2024      Transthoracic Echo (TTE) Complete 11/10/2023    Ejection Fractions:  EF   Date/Time Value Ref Range Status   11/10/2023 10:59 AM 65       Cath:  No results found for this or any previous visit from the past 1095 days.    Stress Test:  No results found for this or any previous visit from the past 1095 days.    Cardiac Imaging:  No results found for this or any previous visit from the past 1095 days.      Inpatient Medications:  Scheduled medications   Medication Dose Route Frequency    aspirin  81 mg oral Daily    atorvastatin  40 mg oral Nightly    cholecalciferol  2,000 Units oral Daily    empagliflozin  25 mg oral Daily    FLUoxetine  20 mg oral Daily    folic acid  1 mg oral Daily    furosemide  20 mg oral Once per day on Monday Wednesday Friday    insulin lispro  0-10 Units subcutaneous TID with meals    leflunomide  20 mg oral Daily    levothyroxine  137 mcg oral Daily    loratadine  10 mg oral Daily    losartan  100 mg oral Daily    metoprolol tartrate  25 mg oral BID    polyethylene glycol  17 g oral Daily    pregabalin  200 mg oral Nightly    ticagrelor  90 mg oral BID    [Held by provider] warfarin  5 mg oral Once per day on Tuesday Friday     [Held by provider] warfarin  7.5 mg oral Once per day on Sunday Monday Wednesday Thursday Saturday     PRN medications   Medication    acetaminophen    dextrose    dextrose    glucagon    glucagon    heparin    HYDROcodone-acetaminophen    HYDROmorphone    loperamide    melatonin    nitroglycerin     Continuous Medications   Medication Dose Last Rate    heparin  0-4,000 Units/hr Stopped (04/22/24 1341)       Physical Exam:  Vitals:    04/23/24 0603   BP: 119/78   Pulse: 65   Resp: 19   Temp: 36.8 °C (98.2 °F)   SpO2: 95%     Constitutional:       Appearance: Normal appearance.   HENT:      Head: Normocephalic.      Nose: Nose normal.      Mouth/Throat:      Mouth: Mucous membranes are moist.   Eyes:      Conjunctiva/sclera: Conjunctivae normal.   Cardiovascular:      Rate and Rhythm: Normal rate and regular rhythm. Mechanical murmur      Heart sounds: No murmur heard.  Pulmonary:      Effort: Pulmonary effort is normal.      Breath sounds: Normal breath sounds.   Abdominal:      Palpations: Abdomen is soft.   Musculoskeletal:      Right lower leg: No edema.      Left lower leg: No edema.   Skin:     General: Skin is warm and dry.   Neurological:      General: No focal deficit present.      Mental Status: He is alert. Mental status is at baseline.   Psychiatric:         Mood and Affect: Mood normal.         Behavior: Behavior normal.      Assessment/Plan   Shabbir Laurent is a 72 yo male with a PMH of CAD s/p Single vessel bypass, Aortic dysfunction s/p mechanical aortic valve both in 2003, HFpEF, HTN, HLD, AUBREY w/ CPAP, DM Type II who was admitted for NSTEMI. Most recent LHC in 2012 with 95% LAD tx with PTCA and PCI, 80% circ s/p PCI, distal RCA 95%, VG to RCA graft not patent.        #NSTEMI s/p PCI to LCX and LAD 4/22/2024 (please refer to cath report for further details)  #hx of CAD s/p single vessel bypass VG to RCA in 2003  #Mechanical AVR  #SupratherapeuticNR, now therapeutic  #HTN  #HLD  #Chronic diastolic heart  failure      -Repeat echocardiogram recommended, reviewed with normal LVSF, mod concentric LVH      Plan  -Restart Warfarin, recommend follow up at warfarin clinic end of this week   -Continue Brilinta  -Continue baby aspirin and recommend stopping once INR reaches therapeutic level  -continue lipitor, add lipid panel this AM, target LDL <70, if not at target recommend adding zetia    -Continue home Jardiance, Lasix, Losartan, Metoprolol   -2-4 weeks follow up with Dr. Goins ([facility] cardiologist)  -recommend cardiac rehab referral     Will sign off   Thank you for the consult       High complex       Peripheral IV 20 G Left;Dorsal Forearm (Active)   Site Assessment Clean;Dry;Intact 04/22/24 2020   Dressing Type Transparent 04/22/24 2020   Line Status Flushed;Heparin locked 04/22/24 2020   Dressing Status Clean;Dry 04/22/24 2020   Number of days:        Code Status:  Full Code    I spent  minutes in the professional and overall care of this patient.        Miguel Ángel English MD

## 2024-04-24 ENCOUNTER — OFFICE VISIT (OUTPATIENT)
Dept: PRIMARY CARE | Facility: CLINIC | Age: 74
End: 2024-04-24
Payer: MEDICARE

## 2024-04-24 ENCOUNTER — PATIENT OUTREACH (OUTPATIENT)
Dept: PRIMARY CARE | Facility: CLINIC | Age: 74
End: 2024-04-24

## 2024-04-24 VITALS
OXYGEN SATURATION: 97 % | HEART RATE: 87 BPM | BODY MASS INDEX: 35.89 KG/M2 | WEIGHT: 265 LBS | DIASTOLIC BLOOD PRESSURE: 72 MMHG | HEIGHT: 72 IN | SYSTOLIC BLOOD PRESSURE: 126 MMHG

## 2024-04-24 DIAGNOSIS — I21.4 NSTEMI (NON-ST ELEVATED MYOCARDIAL INFARCTION) (MULTI): Primary | ICD-10-CM

## 2024-04-24 DIAGNOSIS — I21.4 NSTEMI (NON-ST ELEVATED MYOCARDIAL INFARCTION) (MULTI): ICD-10-CM

## 2024-04-24 DIAGNOSIS — E11.42 TYPE 2 DIABETES MELLITUS WITH DIABETIC POLYNEUROPATHY, WITHOUT LONG-TERM CURRENT USE OF INSULIN (MULTI): ICD-10-CM

## 2024-04-24 DIAGNOSIS — Z98.890 S/P CARDIAC CATHETERIZATION: ICD-10-CM

## 2024-04-24 DIAGNOSIS — Z95.2 MECHANICAL HEART VALVE PRESENT: ICD-10-CM

## 2024-04-24 DIAGNOSIS — E66.01 CLASS 2 SEVERE OBESITY DUE TO EXCESS CALORIES WITH SERIOUS COMORBIDITY AND BODY MASS INDEX (BMI) OF 35.0 TO 35.9 IN ADULT (MULTI): ICD-10-CM

## 2024-04-24 DIAGNOSIS — I73.9 PVD (PERIPHERAL VASCULAR DISEASE) (CMS-HCC): ICD-10-CM

## 2024-04-24 PROBLEM — E66.9 OBESITY (BMI 30-39.9): Status: RESOLVED | Noted: 2023-03-03 | Resolved: 2024-04-24

## 2024-04-24 LAB
ATRIAL RATE: 90 BPM
P AXIS: -8 DEGREES
P OFFSET: 164 MS
P ONSET: 126 MS
POC INR: 1.2 (ref 2.5–3.5)
POC PROTHROMBIN TIME: ABNORMAL
PR INTERVAL: 166 MS
Q ONSET: 209 MS
QRS COUNT: 15 BEATS
QRS DURATION: 112 MS
QT INTERVAL: 382 MS
QTC CALCULATION(BAZETT): 467 MS
QTC FREDERICIA: 437 MS
R AXIS: -41 DEGREES
T AXIS: 64 DEGREES
T OFFSET: 400 MS
VENTRICULAR RATE: 90 BPM

## 2024-04-24 PROCEDURE — 1111F DSCHRG MED/CURRENT MED MERGE: CPT | Performed by: FAMILY MEDICINE

## 2024-04-24 PROCEDURE — 3078F DIAST BP <80 MM HG: CPT | Performed by: FAMILY MEDICINE

## 2024-04-24 PROCEDURE — 99496 TRANSJ CARE MGMT HIGH F2F 7D: CPT | Performed by: FAMILY MEDICINE

## 2024-04-24 PROCEDURE — 3074F SYST BP LT 130 MM HG: CPT | Performed by: FAMILY MEDICINE

## 2024-04-24 PROCEDURE — 4010F ACE/ARB THERAPY RXD/TAKEN: CPT | Performed by: FAMILY MEDICINE

## 2024-04-24 PROCEDURE — 1159F MED LIST DOCD IN RCRD: CPT | Performed by: FAMILY MEDICINE

## 2024-04-24 PROCEDURE — 3048F LDL-C <100 MG/DL: CPT | Performed by: FAMILY MEDICINE

## 2024-04-24 PROCEDURE — 85610 PROTHROMBIN TIME: CPT | Performed by: FAMILY MEDICINE

## 2024-04-24 PROCEDURE — 3008F BODY MASS INDEX DOCD: CPT | Performed by: FAMILY MEDICINE

## 2024-04-24 PROCEDURE — 1160F RVW MEDS BY RX/DR IN RCRD: CPT | Performed by: FAMILY MEDICINE

## 2024-04-24 NOTE — PROGRESS NOTES
Discharge Facility:  Discharge Diagnosis: NSTEMI (non-ST elevated myocardial infarction)   Admission Date: 4/18/2024  Procedure Date: 4/22/2024 Left heart catheterization  Discharge Date: 4/23/2024    PCP Appointment Date: 4/24/2024 15:30  Specialist Appointment Date: 5/8/2024 14:15 Dr. Jose Juan Goins, Cardiology  Hospital Encounter and Summary: Linked     **Post discharge assessment deferred. Patient has a follow up within 2 business days of discharge. See brief summary below:  Wrap Up  Wrap Up Additional Comments: 73yoM with PMHx of mechanical aortic valve on warfarin, open heart surgery, CHF, HTN, HLD, AUBREY (uses CPAP), [id number], who presented to the hospital for diaphoresis and generalized weakness 2/2 NSTEMI. Patient underwent a left heart cath with 2 stents placed on 4/22. Discharged to home self care with Rx's for atorvastatin, Brilinta, and metoprolol tartrate. Rosuvastatin d/c'ed. Cardiology follow up scheduled. (4/24/2024  2:40 PM)

## 2024-04-24 NOTE — PROGRESS NOTES
"Patient: Shabbir Laurent  : 1950  PCP: Jean Polanco DO  MRN: [id number]  Program: Transitional Care Management  Status: Enrolled  Effective Dates: 2024 - present  Responsible Staff: Cony Suárez RN  Social Determinants to be Addressed: Physical Activity, Social Connections, Stress, Tobacco Use         Shabbir Laurent is a 73 y.o. male presenting today for follow-up after being discharged from the hospital 1 days ago. The main problem requiring admission was NSTEMI. The discharge summary and/or Transitional Care Management documentation was reviewed. Medication reconciliation was performed as indicated via the \"[place] as Reviewed\" timestamp.     Shabbri Laurent was contacted by Transitional Care Management services two days after his discharge. This encounter and supporting documentation was reviewed.    [place] [facility] [place]- got 2 stents   Was sweating profusely and that made him go to the ER, had GERD/fatigue a days prior    No CP now  Having some SOB today  Discharged yesterday  No more sweating  No palpitations, n/v, dizziness  No HA  Energy level is better now  No weakness  Usual numbness in feet    Ophtho-  Dentist-  Colonoscopy- - repeat 10 years  RENE-  Cologuard-   PSA-  UA/Micro-  Lung CT-  Coronary Calcium CT Score-  AAA-  EKG-  Pneumovax- 10/15  RSV-   Prevnar-   Flu- 10/23  Shingrix-  Td-   Hep C-  Advance Directives-  AWV- 10/23  MDD screen-  ETOH screen-  HAYDEN-      Review of Systems    /72   Pulse 87   Ht 1.829 m (6')   Wt 120 kg (265 lb)   SpO2 97%   BMI 35.94 kg/m²     Physical Exam  Vitals and nursing note reviewed.   Constitutional:       General: He is not in acute distress.     Appearance: Normal appearance. He is not ill-appearing.   HENT:      Head: Normocephalic and atraumatic.      Right Ear: Tympanic membrane, ear canal and external ear normal.      Left Ear: Tympanic membrane, ear canal and external ear normal.   Eyes:      Extraocular " Movements: Extraocular movements intact.      Conjunctiva/sclera: Conjunctivae normal.      Pupils: Pupils are equal, round, and reactive to light.   Cardiovascular:      Rate and Rhythm: Normal rate and regular rhythm. Frequent Extrasystoles are present.     Pulses: Normal pulses.      Heart sounds: Murmur heard.      Systolic murmur is present with a grade of 2/6.      Comments: Mechanical heart valve heard  Pulmonary:      Effort: Pulmonary effort is normal.      Breath sounds: Normal breath sounds. No wheezing, rhonchi or rales.   Abdominal:      General: Abdomen is flat. Bowel sounds are normal.      Palpations: Abdomen is soft.   Skin:     Capillary Refill: Capillary refill takes less than 2 seconds.   Neurological:      General: No focal deficit present.      Mental Status: He is alert and oriented to person, place, and time.   Psychiatric:         Mood and Affect: Mood normal.         Behavior: Behavior normal.         The complexity of medical decision making for this patient's transitional care is high.    Assessment/Plan   Problem List Items Addressed This Visit             ICD-10-CM    Type 2 diabetes mellitus with diabetic polyneuropathy, without long-term current use of insulin (Multi) E11.42    Relevant Orders    POCT INR manually resulted (Completed)    PVD (peripheral vascular disease) (CMS-[facility]) I73.9    Relevant Orders    POCT INR manually resulted (Completed)    NSTEMI (non-ST elevated myocardial infarction) (Multi) I21.4    Relevant Medications    ticagrelor (Brilinta) 90 mg tablet    Other Relevant Orders    POCT INR manually resulted (Completed)    Class 2 severe obesity due to excess calories with serious comorbidity and body mass index (BMI) of 35.0 to 35.9 in adult (Multi) E66.01, Z68.35    Relevant Orders    POCT INR manually resulted (Completed)   NSTEMI- 2 stents placed, Brilinta for 12 months, follow with cardiology, ASA, lipitor    Obesity- exercise, limit calories    PVD- ASA,  lipitor    DM, type 2- avoid sugars, low carbs, increase CV exercise, continue meds, check feet daily    Mechanical Heart Valve- coumadin, INR goal 2.5-3.5

## 2024-04-25 NOTE — SIGNIFICANT EVENT
Follow Up Phone Call    Outgoing phone call    Spoke to: Shabbir Laurent Relationship: spouse   Phone number: [phone]      Outcome: contacted patient/ family   Chief Complaint   Patient presents with    Hypotension          Diagnosis:Not applicable    States he is feeling better. Cath site free of redness, pain, swelling or drainage.No further questions or concerns.

## 2024-04-26 ENCOUNTER — PATIENT OUTREACH (OUTPATIENT)
Dept: PRIMARY CARE | Facility: CLINIC | Age: 74
End: 2024-04-26
Payer: COMMERCIAL

## 2024-04-26 DIAGNOSIS — G60.9 HEREDITARY AND IDIOPATHIC NEUROPATHY: ICD-10-CM

## 2024-04-26 RX ORDER — PREGABALIN 200 MG/1
200 CAPSULE ORAL NIGHTLY
Qty: 90 CAPSULE | Refills: 3 | Status: SHIPPED | OUTPATIENT
Start: 2024-04-26 | End: 2025-04-26

## 2024-04-26 NOTE — PROGRESS NOTES
Call regarding appt. with Dr. Polanco on 4/24/2024 after hospitalization. Spoke with patient's spouse. Patient sleeping at time of call. Per spouse, recovery seems to be going well. The patient still has some SOB with activity, but no chest pain. SOB resolves with rest. Advised if patient has SOB with chest pain, he needs to go to the ED to be evaluated. Cardiology follow up scheduled for 5/8/2024. Patient plans to ask about cardiac rehab at that time.

## 2024-05-02 ENCOUNTER — CLINICAL SUPPORT (OUTPATIENT)
Dept: PRIMARY CARE | Facility: CLINIC | Age: 74
End: 2024-05-02
Payer: MEDICARE

## 2024-05-02 DIAGNOSIS — Z95.2 MECHANICAL HEART VALVE PRESENT: ICD-10-CM

## 2024-05-02 LAB
POC INR: 1.9
POC PROTHROMBIN TIME: NORMAL

## 2024-05-08 ENCOUNTER — APPOINTMENT (OUTPATIENT)
Dept: PRIMARY CARE | Facility: CLINIC | Age: 74
End: 2024-05-08
Payer: MEDICARE

## 2024-05-08 ENCOUNTER — OFFICE VISIT (OUTPATIENT)
Dept: CARDIOLOGY | Facility: CLINIC | Age: 74
End: 2024-05-08
Payer: MEDICARE

## 2024-05-08 VITALS
OXYGEN SATURATION: 95 % | SYSTOLIC BLOOD PRESSURE: 118 MMHG | DIASTOLIC BLOOD PRESSURE: 64 MMHG | BODY MASS INDEX: 35.89 KG/M2 | HEIGHT: 72 IN | WEIGHT: 265 LBS | HEART RATE: 62 BPM

## 2024-05-08 DIAGNOSIS — I21.4 NSTEMI (NON-ST ELEVATED MYOCARDIAL INFARCTION) (MULTI): ICD-10-CM

## 2024-05-08 DIAGNOSIS — I50.32 CHRONIC DIASTOLIC CONGESTIVE HEART FAILURE (MULTI): ICD-10-CM

## 2024-05-08 DIAGNOSIS — Z95.2 MECHANICAL HEART VALVE PRESENT: Primary | ICD-10-CM

## 2024-05-08 DIAGNOSIS — Z95.5 S/P PRIMARY ANGIOPLASTY WITH CORONARY STENT: ICD-10-CM

## 2024-05-08 DIAGNOSIS — I10 BENIGN ESSENTIAL HYPERTENSION: ICD-10-CM

## 2024-05-08 DIAGNOSIS — Z95.1 HISTORY OF CORONARY ARTERY BYPASS GRAFT X 1: ICD-10-CM

## 2024-05-08 DIAGNOSIS — I25.10 CORONARY ARTERY DISEASE INVOLVING NATIVE CORONARY ARTERY OF NATIVE HEART WITHOUT ANGINA PECTORIS: ICD-10-CM

## 2024-05-08 DIAGNOSIS — I95.89: ICD-10-CM

## 2024-05-08 DIAGNOSIS — E78.2 MIXED HYPERLIPIDEMIA: ICD-10-CM

## 2024-05-08 PROBLEM — E78.00 ELEVATED LDL CHOLESTEROL LEVEL: Status: RESOLVED | Noted: 2023-03-03 | Resolved: 2024-05-08

## 2024-05-08 LAB
ATRIAL RATE: 62 BPM
P AXIS: 17 DEGREES
P OFFSET: 149 MS
P ONSET: 116 MS
PR INTERVAL: 174 MS
Q ONSET: 203 MS
QRS COUNT: 10 BEATS
QRS DURATION: 124 MS
QT INTERVAL: 464 MS
QTC CALCULATION(BAZETT): 470 MS
QTC FREDERICIA: 469 MS
R AXIS: -41 DEGREES
T AXIS: 110 DEGREES
T OFFSET: 435 MS
VENTRICULAR RATE: 62 BPM

## 2024-05-08 PROCEDURE — 3074F SYST BP LT 130 MM HG: CPT | Performed by: INTERNAL MEDICINE

## 2024-05-08 PROCEDURE — 3048F LDL-C <100 MG/DL: CPT | Performed by: INTERNAL MEDICINE

## 2024-05-08 PROCEDURE — 1111F DSCHRG MED/CURRENT MED MERGE: CPT | Performed by: INTERNAL MEDICINE

## 2024-05-08 PROCEDURE — 4010F ACE/ARB THERAPY RXD/TAKEN: CPT | Performed by: INTERNAL MEDICINE

## 2024-05-08 PROCEDURE — 99214 OFFICE O/P EST MOD 30 MIN: CPT | Performed by: INTERNAL MEDICINE

## 2024-05-08 PROCEDURE — 3008F BODY MASS INDEX DOCD: CPT | Performed by: INTERNAL MEDICINE

## 2024-05-08 PROCEDURE — 1160F RVW MEDS BY RX/DR IN RCRD: CPT | Performed by: INTERNAL MEDICINE

## 2024-05-08 PROCEDURE — 1036F TOBACCO NON-USER: CPT | Performed by: INTERNAL MEDICINE

## 2024-05-08 PROCEDURE — 3078F DIAST BP <80 MM HG: CPT | Performed by: INTERNAL MEDICINE

## 2024-05-08 PROCEDURE — 1159F MED LIST DOCD IN RCRD: CPT | Performed by: INTERNAL MEDICINE

## 2024-05-08 RX ORDER — ROSUVASTATIN CALCIUM 10 MG/1
10 TABLET, COATED ORAL DAILY
Qty: 90 TABLET | Refills: 3 | Status: SHIPPED | OUTPATIENT
Start: 2024-05-08 | End: 2024-05-09 | Stop reason: ALTCHOICE

## 2024-05-08 RX ORDER — NITROGLYCERIN 0.4 MG/1
0.4 TABLET SUBLINGUAL EVERY 5 MIN PRN
Qty: 25 TABLET | Refills: 0 | Status: SHIPPED | OUTPATIENT
Start: 2024-05-08 | End: 2025-05-08

## 2024-05-08 RX ORDER — CLOPIDOGREL BISULFATE 75 MG/1
75 TABLET ORAL DAILY
Qty: 30 TABLET | Refills: 11 | Status: SHIPPED | OUTPATIENT
Start: 2024-05-08 | End: 2025-05-08

## 2024-05-08 ASSESSMENT — ENCOUNTER SYMPTOMS
DYSPNEA ON EXERTION: 1
SHORTNESS OF BREATH: 1

## 2024-05-08 NOTE — PROGRESS NOTES
Subjective   Shabbir Laurent is a 73 y.o. male.    Chief Complaint:  Shortness of breath    HPI    Presented to [facility] with chest discomfort.  Prior to his admission he was noted and indigestion.  He noted the chest discomfort was lasting for few hours.  This persisted after taking his heartburn medications.  He noted very elevated blood pressures at home.  He then developed diaphoresis.  He then presented to the emergency room.  He underwent cardiac catheterization.  He is here for follow-up.  His chief complaint today is that of shortness of breath and dyspnea.  He feels very fatigued.  Poor energy levels.    Cardiac catheterization performed on 4/22/2024 demonstrated a circumflex stenosis at the bifurcation.  He also had a proximal left anterior descending coronary artery stenosis.  He underwent angioplasty and stenting of these vessels.  2.5 mm balloons were used in the circumflex lesion a 3.0 x 18 mm stent was placed in the proximal left anterior descending coronary artery.  There was an excellent angiographic outcome.    His cardiac history is significant for the presence of coronary artery disease and valvular heart disease. He is status post aortic valve replacement and single-vessel coronary artery bypass grafting with a vein graft to the right coronary artery. This was performed on December 19, 2003. The valve is a mechanical aortic valve.     His most recent cardiac catheterization was performed on June 27, 2012. This demonstrated 95% left anterior descending treated with balloon angioplasty and stent placement, 80% circumflex treated with balloon angioplasty and stent placement and a distal 95% right coronary artery supplying a small posterolateral branch. The vein graft to the distal right coronary artery was occluded.     He has a history of ascending aortic aneurysm repair.     Other medical problems include hypertension, hyperlipidemia and severe degenerative arthritis.       Allergies  Medication    · hydroCHLOROthiazide TABS   Recorded By: Alicia Vasquez; 6/10/2013 12:31:57 PM  Denied    · Ceramide III REYMUNDO   Updated By: Alma Alfaro; 4/20/2021 9:56:22 AM   · colchicine   Adverse Reaction; 14 Oct 2017; Updated By: Alma Alfaro; 4/20/2021 9:56:22 AM  abd pain cramping and diarrhea 1 tablet day dose after 1 month DC     Family History  Mother    · Family history of Arthritis (V17.7)   · Family history of Stroke Syndrome (V17.1)  Father    · Family history of Arthritis (V17.7)   · FH: CABG (coronary artery bypass surgery) (V17.3) (Z82.49)  Son    · Family history of sleep apnea (V19.8) (Z82.0)     Social History  Problems    · Alcohol Use (History)   · Does not use illicit drugs (V49.89) (Z78.9)   ·    · Never a smoker        Review of Systems   Constitutional: Positive for malaise/fatigue.   Cardiovascular:  Positive for dyspnea on exertion.   Musculoskeletal:  Positive for arthritis and back pain.     Review of Systems   Constitutional: Positive for malaise/fatigue.   Cardiovascular:  Positive for chest pain and dyspnea on exertion.   Respiratory:  Positive for shortness of breath.    Musculoskeletal:  Positive for arthritis and joint pain.   All other systems reviewed and are negative.      Visit Vitals  /64 (BP Location: Right arm)   Pulse 62   Ht 1.829 m (6')   Wt 120 kg (265 lb)   SpO2 95%   BMI 35.94 kg/m²   Smoking Status Former   BSA 2.47 m²        Objective     Constitutional:       Appearance: Not in distress.   Neck:      Vascular: JVD normal.   Pulmonary:      Breath sounds: Normal breath sounds.   Cardiovascular:      Normal rate. Regular rhythm. mechanical S1. mechanical S2.       Murmurs: There is a grade 2/6 systolic murmur.      No gallop.    Pulses:     Intact distal pulses.   Edema:     Peripheral edema absent.   Abdominal:      General: Bowel sounds are normal.   Neurological:      Mental Status: Alert and oriented to person, place and time.          Lab Review:   Lab Results   Component Value Date     04/23/2024    K 3.5 04/23/2024     04/23/2024    CO2 22 04/23/2024    BUN 17 04/23/2024    CREATININE 1.11 04/23/2024    GLUCOSE 149 (H) 04/23/2024    CALCIUM 9.0 04/23/2024     Lab Results   Component Value Date    WBC 6.3 04/23/2024    HGB 14.0 04/23/2024    HCT 43.1 04/23/2024    MCV 93 04/23/2024     04/23/2024       Assessment:    1.  Coronary disease.  Acute non-ST segment elevation myocardial infarction.  Peak troponin was 6000.  There may be an element of reperfusion.  Echocardiographic study showed low normal left ventricular systolic function.    2.  Status post PCI stent.  Good angiographic outcome.  We personally reviewed the images.    3.  Shortness of breath.  Likely secondary to the use of Brilinta.  Will continue Brilinta therapy for 6 weeks and then switch to clopidogrel.  Studies have demonstrated that clopidogrel is equally effective after 6 weeks under the circumstances.    4.  Mechanical aortic valve.  Aortic valve is functioning normally.    5.  Hyperlipidemia.  Cholesterol 140, HDL 33, LDL 59.  Will continue rosuvastatin and discontinue atorvastatin.

## 2024-05-08 NOTE — PATIENT INSTRUCTIONS
Go back to the rosuvastatin 10 mg daily as your labs looked great on this dose and this medication.    On June 1, stop the Brilinta and start clopidogrel 75 mg daily    Stop aspirin now.    No restrictions on your activities.

## 2024-05-09 ENCOUNTER — TELEPHONE (OUTPATIENT)
Dept: CARDIOLOGY | Facility: CLINIC | Age: 74
End: 2024-05-09
Payer: COMMERCIAL

## 2024-05-09 DIAGNOSIS — E78.5 HYPERLIPIDEMIA, UNSPECIFIED HYPERLIPIDEMIA TYPE: Primary | ICD-10-CM

## 2024-05-09 RX ORDER — METOPROLOL TARTRATE 25 MG/1
25 TABLET, FILM COATED ORAL 2 TIMES DAILY
Qty: 180 TABLET | Refills: 3 | Status: SHIPPED | OUTPATIENT
Start: 2024-05-09

## 2024-05-09 RX ORDER — ROSUVASTATIN CALCIUM 20 MG/1
20 TABLET, COATED ORAL DAILY
Qty: 90 TABLET | Refills: 3 | Status: SHIPPED | OUTPATIENT
Start: 2024-05-09 | End: 2025-05-09

## 2024-05-09 NOTE — TELEPHONE ENCOUNTER
[place] called because pt saw Dr. Goins yesterday and Rosuvastatin 10mg was sent to [facility]. Per [place], pt takes Rosuvastatin 20mg daily.    Reviewed with Dr. Goins. Per Dr. Goins, pt to take Rosuvastatin 20mg daily. Will pend new rx to Dr. Goins. [place] notified.

## 2024-05-14 ENCOUNTER — CLINICAL SUPPORT (OUTPATIENT)
Dept: CARDIAC REHAB | Facility: HOSPITAL | Age: 74
End: 2024-05-14
Payer: MEDICARE

## 2024-05-14 ENCOUNTER — DOCUMENTATION (OUTPATIENT)
Dept: CARDIAC REHAB | Facility: HOSPITAL | Age: 74
End: 2024-05-14

## 2024-05-14 DIAGNOSIS — I25.10 CORONARY ARTERY DISEASE INVOLVING NATIVE CORONARY ARTERY OF NATIVE HEART WITHOUT ANGINA PECTORIS: ICD-10-CM

## 2024-05-14 DIAGNOSIS — I21.4 NSTEMI (NON-ST ELEVATED MYOCARDIAL INFARCTION) (MULTI): ICD-10-CM

## 2024-05-14 DIAGNOSIS — Z95.5 STENTED CORONARY ARTERY: ICD-10-CM

## 2024-05-14 NOTE — PROGRESS NOTES
Name: Shabbir Laurent   : 1950   Diagnosis: NSTEMI/ PCI  MRN: [id number]   Onset Date:2024   Today's Date: 24      Cardiovascular   HX: CAD, CHF, HTN, HLD, NSTEMI, and Valve  Family HX of CAD:  Yes  Angina: none  Describe:  Last Episode:  History of Heart Failure: Yes  EF: Over 50%  Onset of HF:   Last HF hospitalization: UNKNOWN  Family HX of HF:  Yes  HF symptoms: Fatigue with exertion    Devices:  NA  HX of PAD:  PVD    Arrythmias: normal sinus rhythm  Apical: regular  Heart Rate: 48  BP: 110/60  Radial pulses:  R Present 2+ L Present 2+    Comments: PT HAD AORTIC VALVE REPLACEMENT/CABG X1 AND ANEURYSM REPAIR 2003- COMPLETED CARDIAC REHAB AT THAT TIME.      Respiratory  HX: Asthma  Dyspnea:  Yes  Describe: PT HAS BEEN VERY SHORT OF BREATH WITH MINIMAL EXERTION POST PCI PER MD PT TO STOP BRILINTA AND START PLAVIX .  HX AUBREY:  Yes  CPAP Use:  Yes  Family History of Lung Disease:  No    Resting O2 sat:  Lung Sounds:  CLEAR  Locations: all lobes    Comments:    Neurological   Orientation: oriented to person, place, time, and general circumstances  HX: DENIES  History of stroke/TIA?: NO    Comments:       Skin  Skin Color: normal, no cyanosis, jaundice, pallor or bruising  Edema:  NONE      Comments:    Gastrointestinal/Genitourinary  HX: DENIES  Comments:      Psychosocial  Marital status:   Children: 2  Lives alone:  No  Lives with: SPOUSE AND DAUGHTER  Drives:  Yes  Occupation: is retired   Caretaker of family member?:  No  Do you feel safe at home?:  Yes    Caffeinated drinks per day: 2  Alcoholic drinks per day/week: <1  HX drug or alcohol abuse?: No  Current use of illicit drugs?: No    Comments:    Musculoskeletal  HX of injury/surgery:   RIGHT SHOULDER ROTATOR CUFF REPAIR  LEFT KNEE ARTHROSCOPIC    Comments:    Pain Assessment  Current pain: chronic  Location: BACK/ LEGS/ ALL JOINTS- LEFT SHOULDER/ BILATERAL KNEES  Description: DULL ACHE    Comments:    Fall Risk  Assessment  Assistive device: cane  Needs assistance:  Yes  Afraid of falling:  No  Fall within the past 6 months?: No  Injured with fall:  Fall risk results: high

## 2024-05-14 NOTE — PROGRESS NOTES
Cardiac Rehabilitation Initial Treatment Plan    Name: Shabbir Laurent  Medical Record Number: [id number]  YOB: 1950  Age: 73 y.o.    Today’s Date: 5/14/2024  Primary Care Physician: Jean Polanco DO  Referring Physician: DR. FREDRICK QUINTANA  Program Location[facility]  Primary Diagnosis: NSTEMI/ PCI  Onset/Date of Diagnosis: 4/22/2024    Initial Assessment, not yet started program.    AACVPR Risk Stratification:       Falls Risk: High  Psychosocial Assessment     Initial PHQ-9=5    Sent PH-Q 9 to MD if score > 20: No; score < 20    Pt reported/currently experiencing stress: No  Patient uses stress management skills: No   History of: depression  Currently seeing a mental health provider: No  Social Support: Yes, Whom:SPOUSE  Quality of Life Survey:  DOUGLAS MOBLEY  Learning Assessment:  Learning assessment/barriers: None  Preferred learning method: Auditory and Reading handout  Barriers: Hearing problems  Comments:    Stages of Change:Action    Psychosocial Plan    Goal Status: Initial Assessment; goals not yet started    Psychosocial Goals: Demonstrating proper techniques for stress management, Maintain or lower PH-Q 9 score by discharge, Identify strategies for managing depression, and Identify social supports    Psychosocial Interventions/Education:   *STRESS MANAGEMENT HANDOUT  *ASK PATIENT AT LEAST ONCE EVERY 30 DAYS ABOUT STRESS     Initial Assessment: DOUGLAS MOBLEY QOL / PHQ-9 SURVEYS COMPLETED    Nutrition Assessment:    Hyperlipidemia: Yes     Lipids:   Lab Results   Component Value Date    CHOL 140 04/23/2024    HDL 32.8 04/23/2024    LDLF - 05/03/2023    TRIG 239 (H) 04/23/2024       Current Dietary Guidelines: Low sodium  Barriers to dietary change: no    Diet Habit Survey: Rate Your Plate  Pre:  RYP 44/69  Post: To be done at discharge.    Diabetes Assessment    Lab Results   Component Value Date    HGBA1C 6.7 (A) 10/04/2023       History of Diabetes: Yes Pt monitors  BS at home: Yes   Frequency: 1 /day  FBS range: 120  - 150  Hypoglycemic Episodes: No     Weight Management       INTAKE WEIGHT: 269.9    Nutrition Plan    Goal Status: Initial Assessment; goals not yet started    Nutrition Goals: Learn how to read and interpret nutrition labels prior to discharge, Lose 1lb/week while enrolled in program, Check blood glucose daily, Verbalize S/S of hypoglycemia or hyperglycemia by discharge, and IMPROVE RYP SURVEY RESULTS    Nutrition Interventions/Education:   *NUTRITION EDUCATION HANDOUTS  *CHOLESTEROL MANAGEMENT HANDOUT     Initial Assessment: RYP SURVEY COMPLETED    Exercise Assessment    No  Mode: NA  Frequency: NA  Duration: NA    Exercise Prescription     Exercise Prescription based on: [facility] Activity Status Index (DASI)    DASI Score:   4.5  MET Score:  1.2   and Pre-rehab stress test   Frequency:  2 days/week   Mode: Treadmill, NuStep, and Recumbent Cycle   Duration: 30-45 total aerobic minutes   Intensity: RPE 12-14  Target HR:     MET Level: 1.5-2.5  Patient wears supplemental O2: No     Modality Workload METs Duration (minutes)   1 Pre-Exercise      2 Treadmill 1  1.8 05 :00   3 Recumbent Bike 10  2.4 05 :00   4 NuStep 22  2 05 :00   5 Weights 2-3 LBS  10 :00   6 Post-Exercise        Resistance Training: Yes   Home Exercise Prescription given: No    Exercise Plan    Goal Status: Initial Assessment; goals not yet started    Exercise Goals: Increase exercise MET level by 5-10% each week, Increase total exercise duration to 30-45 minutes, Initiate strength training 2-3 days a week, and Establish a home exercise program before discharge    Exercise Interventions/Education:   *EXERCISE FUNDAMENTALS AND MAINTENANCE HANDOUT  *REVIEW THR AND INSTRUCTIONS FOR RADIAL PULSE CHECK     Initial Assessment: [facility] COMPLETED/ EST SCHEDULED    Other Core Components/Risk Factor Assessment:    Medication adherence  Current Medications:   Medication Documentation Review Audit        Reviewed by Yuniel De La Cruz MA (Medical Assistant) on 05/08/24 at 1444      Medication Order Taking? Sig Documenting Provider Last Dose Status   aspirin 81 mg EC tablet [id number] Yes Take 1 tablet (81 mg) by mouth once daily. Continue until INR is in therapeutic range and then can discontinue. Kashif Ellis, DO Taking Active   atorvastatin (Lipitor) 40 mg tablet [id number] Yes Take 1 tablet (40 mg) by mouth once daily at bedtime. Kashif Ellis, DO Taking Active   blood glucose control, normal solution [id number] Yes Use as directed Historical Provider, MD Taking Active   cetirizine (ZYRTEC) 10 mg capsule [id number] Yes Take 1 capsule (10 mg) by mouth once daily in the morning. Historical Provider, MD Taking Active   cholecalciferol (Vitamin D-3) 50 mcg (2,000 unit) capsule [id number] Yes Take 1 capsule (50 mcg) by mouth early in the morning.. Historical Provider, MD Taking Active   FLUoxetine (PROzac) 20 mg capsule [id number] Yes Take 1 capsule (20 mg) by mouth once daily.   Patient taking differently: Take 1 capsule (20 mg) by mouth once daily in the morning.    Karl Mock, DO Taking Active   fluticasone propion-salmeteroL (Advair Diskus) 250-50 mcg/dose diskus inhaler [id number] Yes Inhale 1 puff 2 times a day. During summer (grass cutting) Karl Mock, DO Taking Differently Active   folic acid (Folvite) 1 mg tablet [id number] Yes Take 1 tablet (1 mg) by mouth once daily.   Patient taking differently: Take 1 tablet (1 mg) by mouth once daily in the morning.    Karl Mock,  Taking Active   furosemide (Lasix) 20 mg tablet [id number] Yes Take 1 tablet (20 mg) by mouth 4 times a week. Take every Mon, Wed, Fri, and Sat Historical Provider, MD Taking Active   HYDROcodone-acetaminophen (Norco) 5-325 mg tablet [id number] Yes Take 1 tablet by mouth 3 times a day as needed for moderate pain (4 - 6) for up to 28 days. Do not start before April 10, 2024. Alma Alfaro, APRN-CNP Taking Differently Active   Jardiance 25 mg  [id number] Yes TAKE 1 TABLET BY MOUTH DAILY Karl Mock, DO Taking Active   krill-om3-dha-epa-om6-lip-astx (Krill Oil, Omega 3 and 6,) 1,500-165-67.5 mg capsule [id number] Yes Take 1 capsule by mouth once daily. Historical Provider, MD Taking Active   lancets 33 gauge misc [id number] Yes 1 each by percutaneous route once daily. Test BS daily Karl Mock, DO Taking Active   leflunomide (Arava) 20 mg tablet [id number] Yes Take 1 tablet by mouth once daily Jean Polanco, DO Taking Active   levothyroxine (Synthroid, Levoxyl) 137 mcg tablet [id number] Yes Take 1 tablet (137 mcg) by mouth once daily. Karl Mock, DO Taking Active   losartan (Cozaar) 100 mg tablet [id number] Yes Take 1 tablet (100 mg) by mouth once daily. Karl Mock, DO Taking Active   metFORMIN (Glucophage) 850 mg tablet [id number] Yes Take 1 tablet (850 mg) by mouth 2 times a day with meals. Karl Mock, DO Taking Active   metoprolol tartrate (Lopressor) 25 mg tablet [id number] Yes Take 1 tablet (25 mg) by mouth 2 times a day. Kashif Ellis, DO Taking Active   multivit-min/FA/lycopen/lutein (CENTRUM SILVER MEN ORAL) [id number] Yes Take 1 tablet by mouth once daily. Historical Provider, MD Taking Active   nitroglycerin (Nitrostat) 0.4 mg SL tablet [id number] Yes Place 1 tablet (0.4 mg) under the tongue every 5 minutes if needed for chest pain. Historical Provider, MD Taking Differently Active   OneTouch Ultra Test strip [id number] Yes Test blood glucose once daily Karl Mock, DO Taking Active   pregabalin (Lyrica) 200 mg capsule [id number] Yes Take 1 capsule (200 mg) by mouth once daily at bedtime. Last OARRS fill: 1/23/24 #90 for 90 days Jean Polanco, DO Taking Active   ticagrelor (Brilinta) 90 mg tablet [id number] Yes Take 1 tablet (90 mg) by mouth 2 times a day. Jean Polanco, DO Taking Active   warfarin (Coumadin) 5 mg tablet [id number] Yes TAKE 1 AND 1/2 TABLETS BY MOUTH  5 TIMES WEEKLY AND 1 TABLET BY  MOUTH TWICE WEEKLY DAILY OR AS   DIRECTED AFTER INR TESTING. DO  NOT START BEFORE MARCH 11, 2023   Patient taking differently: Take 1 tablet (5 mg) by mouth 2 times a week. On Tuesday and Friday evenings    Trevon Navimarquis, DO Taking Active   warfarin (Coumadin) 5 mg tablet [id number] Yes Take 1.5 tablets (7.5 mg) by mouth 5 times a week. On Sunday, Monday, Wednesday, Thursday, Saturday evenings Historical Provider, MD Taking Active                                 Medication compliance: Yes   Uses pill box/organizer: Yes    Carries medication list: Yes     Blood Pressure Management  History of Hypertension: Yes   Medication Changes: No   Resting BP:  There were no vitals taken for this visit.     Heart Failure Management  Hx of Heart Failure: Yes;   Type (selection): HFpEF  Most recent EF:   55-60%    Onset of heart failure diagnosis: 2003  Last heart failure hospitalization: NA  Number of HF admissions per year: 0    Symptoms: Fatigue with exertion  Is there a family Hx of HF: Yes   Does patient obtain daily weight No         Heart Failure Reassessment: Initial Treatment Plan. Will reassess in 30 days.    Heart Failure Goals: Able to verbalize signs and symptoms of fluid retention and when to contact MD, Adhere to proper fluid restrictions, Adapt a low sodium diet and verbalize guidelines, and Obtain daily weight and verbalize proper method of obtaining weights    Smoking/Tobacco Assessment  Social History     Tobacco Use   Smoking Status Former    Types: Cigarettes   Smokeless Tobacco Never       Other Core Component Plan    Goal Status: Initial Assessment; goals not yet started    Other Core Component Goals: Medication compliance, Verbalize medication usage and drug actions by discharge, and Achieve resting BP of < 130/80 by discharge    Other Core Component Interventions/Education:   *EDUCATION: CAD, HYPERTENSION AND STROKE, RISK FACTORS FOR HEART DISEASE, UNDERSTANDING HEART VALVE DISEASE, UNDERSTANDING CHF.  *PROVIDE MED EDUCATION  TOOL  *PROVIDE MED LIST IF NEEDED     Initial Assessment: KNOWLEDGE QUIZ COMPLETED 11/15    Individual Patient Goals:    COMPLETE ADL'S WITHOUT FEELING SHORT OF BREATH  LOSE 1-2 LBS PER WEEK WHILE IN THE PROGRAM    Goal Status: Initial Assessment; goals not yet started    Staff Comments:  ADVANCED DIRECTIVES ADDRESSED WITH PATIENT- STATES POA AND LIVING WILL IN PLACE.    Rehab Staff Signature: Kanika Vega RN

## 2024-05-16 ENCOUNTER — CLINICAL SUPPORT (OUTPATIENT)
Dept: PRIMARY CARE | Facility: CLINIC | Age: 74
End: 2024-05-16
Payer: MEDICARE

## 2024-05-16 DIAGNOSIS — Z95.2 HISTORY OF HEART VALVE REPLACEMENT WITH MECHANICAL VALVE: ICD-10-CM

## 2024-05-16 LAB
POC INR: 3.9 (ref 2.5–3.5)
POC PROTHROMBIN TIME: ABNORMAL

## 2024-05-17 ENCOUNTER — PATIENT OUTREACH (OUTPATIENT)
Dept: PRIMARY CARE | Facility: CLINIC | Age: 74
End: 2024-05-17
Payer: COMMERCIAL

## 2024-05-20 ENCOUNTER — CLINICAL SUPPORT (OUTPATIENT)
Dept: CARDIAC REHAB | Facility: HOSPITAL | Age: 74
End: 2024-05-20
Payer: MEDICARE

## 2024-05-20 DIAGNOSIS — I21.4 NSTEMI (NON-ST ELEVATED MYOCARDIAL INFARCTION) (MULTI): ICD-10-CM

## 2024-05-20 DIAGNOSIS — Z95.5 STENTED CORONARY ARTERY: ICD-10-CM

## 2024-05-20 PROCEDURE — 93798 PHYS/QHP OP CAR RHAB W/ECG: CPT | Performed by: INTERNAL MEDICINE

## 2024-05-21 ENCOUNTER — HOSPITAL ENCOUNTER (OUTPATIENT)
Dept: CARDIOLOGY | Facility: HOSPITAL | Age: 74
Discharge: HOME | End: 2024-05-21
Payer: MEDICARE

## 2024-05-21 DIAGNOSIS — I21.4 NSTEMI (NON-ST ELEVATED MYOCARDIAL INFARCTION) (MULTI): ICD-10-CM

## 2024-05-21 DIAGNOSIS — Z95.5 STENTED CORONARY ARTERY: ICD-10-CM

## 2024-05-21 PROCEDURE — 93017 CV STRESS TEST TRACING ONLY: CPT

## 2024-05-22 ENCOUNTER — APPOINTMENT (OUTPATIENT)
Dept: PRIMARY CARE | Facility: CLINIC | Age: 74
End: 2024-05-22
Payer: MEDICARE

## 2024-05-22 ENCOUNTER — CLINICAL SUPPORT (OUTPATIENT)
Dept: CARDIAC REHAB | Facility: HOSPITAL | Age: 74
End: 2024-05-22
Payer: MEDICARE

## 2024-05-22 DIAGNOSIS — I21.4 NSTEMI (NON-ST ELEVATED MYOCARDIAL INFARCTION) (MULTI): ICD-10-CM

## 2024-05-22 DIAGNOSIS — Z95.5 STENTED CORONARY ARTERY: ICD-10-CM

## 2024-05-22 PROCEDURE — 93798 PHYS/QHP OP CAR RHAB W/ECG: CPT | Performed by: INTERNAL MEDICINE

## 2024-05-23 ENCOUNTER — CLINICAL SUPPORT (OUTPATIENT)
Dept: PRIMARY CARE | Facility: CLINIC | Age: 74
End: 2024-05-23
Payer: MEDICARE

## 2024-05-23 DIAGNOSIS — I73.9 PVD (PERIPHERAL VASCULAR DISEASE) (CMS-HCC): ICD-10-CM

## 2024-05-23 LAB
POC INR: 3.1
POC PROTHROMBIN TIME: NORMAL

## 2024-05-24 ENCOUNTER — CLINICAL SUPPORT (OUTPATIENT)
Dept: CARDIAC REHAB | Facility: HOSPITAL | Age: 74
End: 2024-05-24
Payer: MEDICARE

## 2024-05-24 DIAGNOSIS — I21.4 NSTEMI (NON-ST ELEVATED MYOCARDIAL INFARCTION) (MULTI): ICD-10-CM

## 2024-05-24 DIAGNOSIS — Z95.5 STENTED CORONARY ARTERY: ICD-10-CM

## 2024-05-24 PROCEDURE — 93798 PHYS/QHP OP CAR RHAB W/ECG: CPT | Performed by: INTERNAL MEDICINE

## 2024-05-29 ENCOUNTER — CLINICAL SUPPORT (OUTPATIENT)
Dept: CARDIAC REHAB | Facility: HOSPITAL | Age: 74
End: 2024-05-29
Payer: MEDICARE

## 2024-05-29 DIAGNOSIS — Z95.5 STENTED CORONARY ARTERY: ICD-10-CM

## 2024-05-29 DIAGNOSIS — I21.4 NSTEMI (NON-ST ELEVATED MYOCARDIAL INFARCTION) (MULTI): ICD-10-CM

## 2024-05-29 PROCEDURE — 93798 PHYS/QHP OP CAR RHAB W/ECG: CPT

## 2024-05-31 ENCOUNTER — CLINICAL SUPPORT (OUTPATIENT)
Dept: CARDIAC REHAB | Facility: HOSPITAL | Age: 74
End: 2024-05-31
Payer: MEDICARE

## 2024-05-31 ENCOUNTER — APPOINTMENT (OUTPATIENT)
Dept: CARDIOLOGY | Facility: CLINIC | Age: 74
End: 2024-05-31
Payer: COMMERCIAL

## 2024-05-31 DIAGNOSIS — Z95.5 STENTED CORONARY ARTERY: ICD-10-CM

## 2024-05-31 DIAGNOSIS — I21.4 NSTEMI (NON-ST ELEVATED MYOCARDIAL INFARCTION) (MULTI): ICD-10-CM

## 2024-05-31 PROCEDURE — 93798 PHYS/QHP OP CAR RHAB W/ECG: CPT

## 2024-06-03 ENCOUNTER — CLINICAL SUPPORT (OUTPATIENT)
Dept: CARDIAC REHAB | Facility: HOSPITAL | Age: 74
End: 2024-06-03
Payer: MEDICARE

## 2024-06-03 DIAGNOSIS — I21.4 NSTEMI (NON-ST ELEVATED MYOCARDIAL INFARCTION) (MULTI): ICD-10-CM

## 2024-06-03 DIAGNOSIS — Z95.5 STENTED CORONARY ARTERY: ICD-10-CM

## 2024-06-03 PROCEDURE — 93798 PHYS/QHP OP CAR RHAB W/ECG: CPT

## 2024-06-05 ENCOUNTER — CLINICAL SUPPORT (OUTPATIENT)
Dept: CARDIAC REHAB | Facility: HOSPITAL | Age: 74
End: 2024-06-05
Payer: MEDICARE

## 2024-06-05 DIAGNOSIS — Z95.5 STENTED CORONARY ARTERY: ICD-10-CM

## 2024-06-05 DIAGNOSIS — I21.4 NSTEMI (NON-ST ELEVATED MYOCARDIAL INFARCTION) (MULTI): ICD-10-CM

## 2024-06-05 PROCEDURE — 93798 PHYS/QHP OP CAR RHAB W/ECG: CPT | Performed by: INTERNAL MEDICINE

## 2024-06-06 ENCOUNTER — ANTICOAGULATION - WARFARIN VISIT (OUTPATIENT)
Dept: PRIMARY CARE | Facility: CLINIC | Age: 74
End: 2024-06-06
Payer: COMMERCIAL

## 2024-06-06 ENCOUNTER — APPOINTMENT (OUTPATIENT)
Dept: PRIMARY CARE | Facility: CLINIC | Age: 74
End: 2024-06-06
Payer: MEDICARE

## 2024-06-06 DIAGNOSIS — Z95.2 HISTORY OF HEART VALVE REPLACEMENT WITH MECHANICAL VALVE: ICD-10-CM

## 2024-06-06 LAB
POC INR: 3.1
POC PROTHROMBIN TIME: NORMAL

## 2024-06-07 ENCOUNTER — CLINICAL SUPPORT (OUTPATIENT)
Dept: CARDIAC REHAB | Facility: HOSPITAL | Age: 74
End: 2024-06-07
Payer: MEDICARE

## 2024-06-07 DIAGNOSIS — M47.26 OSTEOARTHRITIS OF SPINE WITH RADICULOPATHY, LUMBAR REGION: ICD-10-CM

## 2024-06-07 DIAGNOSIS — Z95.5 STENTED CORONARY ARTERY: ICD-10-CM

## 2024-06-07 DIAGNOSIS — M51.26 HERNIATED NUCLEUS PULPOSUS, L2-3: ICD-10-CM

## 2024-06-07 DIAGNOSIS — M48.062 NEUROGENIC CLAUDICATION DUE TO LUMBAR SPINAL STENOSIS: ICD-10-CM

## 2024-06-07 DIAGNOSIS — I21.4 NSTEMI (NON-ST ELEVATED MYOCARDIAL INFARCTION) (MULTI): ICD-10-CM

## 2024-06-07 PROCEDURE — 93798 PHYS/QHP OP CAR RHAB W/ECG: CPT

## 2024-06-07 NOTE — TELEPHONE ENCOUNTER
You saw him last 4/24. He only filled 1 rx due to having Miand was taking less than prescribed due to immobility. He does have a follow up with you at end of month. He is up to date on toxicology/contracts. This can wait until Monday

## 2024-06-10 ENCOUNTER — CLINICAL SUPPORT (OUTPATIENT)
Dept: CARDIAC REHAB | Facility: HOSPITAL | Age: 74
End: 2024-06-10
Payer: MEDICARE

## 2024-06-10 DIAGNOSIS — I21.4 NSTEMI (NON-ST ELEVATED MYOCARDIAL INFARCTION) (MULTI): ICD-10-CM

## 2024-06-10 DIAGNOSIS — Z95.5 STENTED CORONARY ARTERY: ICD-10-CM

## 2024-06-10 PROCEDURE — 93798 PHYS/QHP OP CAR RHAB W/ECG: CPT

## 2024-06-10 RX ORDER — HYDROCODONE BITARTRATE AND ACETAMINOPHEN 5; 325 MG/1; MG/1
1 TABLET ORAL 3 TIMES DAILY PRN
Qty: 84 TABLET | Refills: 0 | Status: SHIPPED | OUTPATIENT
Start: 2024-06-10 | End: 2024-07-08

## 2024-06-12 ENCOUNTER — DOCUMENTATION (OUTPATIENT)
Dept: CARDIAC REHAB | Facility: HOSPITAL | Age: 74
End: 2024-06-12

## 2024-06-12 ENCOUNTER — CLINICAL SUPPORT (OUTPATIENT)
Dept: CARDIAC REHAB | Facility: HOSPITAL | Age: 74
End: 2024-06-12
Payer: MEDICARE

## 2024-06-12 DIAGNOSIS — I21.4 NSTEMI (NON-ST ELEVATED MYOCARDIAL INFARCTION) (MULTI): ICD-10-CM

## 2024-06-12 DIAGNOSIS — Z95.5 STENTED CORONARY ARTERY: ICD-10-CM

## 2024-06-12 PROCEDURE — 93798 PHYS/QHP OP CAR RHAB W/ECG: CPT | Performed by: INTERNAL MEDICINE

## 2024-06-12 NOTE — PROGRESS NOTES
Cardiac Rehabilitation 30 Day Reassessment    Name: Shabbir Laurent  Medical Record Number: [id number]  YOB: 1950  Age: 73 y.o.    Today’s Date: 6/12/2024  Primary Care Physician: Jean Polanco DO  Referring Physician: DR. FREDRICK QUINTANA  Program Location: [facility]  Primary Diagnosis: NSTEMI/ PCI  Onset/Date of Diagnosis: 4/22/2024    SESSION # 10    AACVPR Risk Stratification:   HIGH    Falls Risk: High  Psychosocial Assessment     Initial PHQ-9=5    Sent PH-Q 9 to MD if score > 20: No; score < 20    Pt reported/currently experiencing stress: No  Patient uses stress management skills: No   History of: depression  Currently seeing a mental health provider: No  Social Support: Yes, Whom:SPOUSE  Quality of Life Survey:  DOUGLAS MOBLEY  Quality of Life Survey:  PRE POST   GLOBAL SCORE 22.61 NA   HEALTH/FUNCTIONING SCORE 18.40 NA   SOCIAL/ECONOMIC SCORE 28.33 NA   PSYCHOLOGICAL/SPIRITUAL SCORE 25.71 NA   FAMILY SCORE 24.00 NA   Learning Assessment:  Learning assessment/barriers: None  Preferred learning method: Auditory and Reading handout  Barriers: Hearing problems  Comments:    Stages of Change:Action    Psychosocial Plan    Goal Status: In progress    Psychosocial Goals: Demonstrating proper techniques for stress management, Maintain or lower PH-Q 9 score by discharge, Identify strategies for managing depression, and Identify social supports    Psychosocial Interventions/Education:   *STRESS MANAGEMENT HANDOUT  *ASK PATIENT AT LEAST ONCE EVERY 30 DAYS ABOUT STRESS     Initial Assessment: DOUGLAS MOBLEY QOL / PHQ-9 SURVEYS COMPLETED  REASSESSMENT:  6/12/2024 REVIEWED INITIAL PHQ-9=5 MILD DEPRESSION SEVERITY AND QOL SURVEY RESULTS.        Nutrition Assessment:    Hyperlipidemia: Yes     Lipids:   Lab Results   Component Value Date    CHOL 140 04/23/2024    HDL 32.8 04/23/2024    LDLF - 05/03/2023    TRIG 239 (H) 04/23/2024       Current Dietary Guidelines: Low sodium  Barriers to  dietary change: no    Diet Habit Survey: Rate Your Plate  Pre:  RYP 44/69  Post: To be done at discharge.    Diabetes Assessment    Lab Results   Component Value Date    HGBA1C 6.7 (A) 10/04/2023       History of Diabetes: Yes Pt monitors BS at home: Yes   Frequency: 1 /day  FBS range: 120  - 150  Hypoglycemic Episodes: No     Weight Management       INTAKE WEIGHT: 269.9  CURRENT WEIGHT: 268      Nutrition Plan    Goal Status: In progress    Nutrition Goals: Learn how to read and interpret nutrition labels prior to discharge, Lose 1lb/week while enrolled in program, Check blood glucose daily, Verbalize S/S of hypoglycemia or hyperglycemia by discharge, and IMPROVE RYP SURVEY RESULTS    Nutrition Interventions/Education:   *NUTRITION EDUCATION HANDOUTS  *CHOLESTEROL MANAGEMENT HANDOUT     Initial Assessment: RYP SURVEY COMPLETED  REASSESSMENT:  6/12/2024 REVIEWED INITIAL RYP 44/69  THERE ARE SOME WAYS YOU CAN MAKE YOUR EATING HABITS HEALTHIER. PT SET DIETARY GOALS:   CONTROL PORTION SIZE, EAT LESS BREAD, DECREASE SALT AND SUGAR INTAKE.        Exercise Assessment    No  Mode: NA  Frequency: NA  Duration: NA    Exercise Prescription     Exercise Prescription based on: [facility] Activity Status Index (DASI)    DASI Score:   4.5  MET Score:  1.2   and Pre-rehab stress test   Frequency:  2 days/week   Mode: Treadmill, NuStep, and Recumbent Cycle   Duration: 30-45 total aerobic minutes   Intensity: RPE 12-14  Target HR:     MET Level: 2.2-2.6  Patient wears supplemental O2: No     Modality Workload METs Duration (minutes)   1 Pre-Exercise      2 Treadmill 1.6 2.2 07:00   3 Recumbent Bike 24@5 2.6 07 :00   4 NuStep 50@7 2.5 07 :00   5 Weights 2-3 LBS  10 :00   6 Post-Exercise        Resistance Training: Yes   Home Exercise Prescription given: No    Exercise Plan    Goal Status: In progress    Exercise Goals: Increase exercise MET level by 5-10% each week, Increase total exercise duration to 30-45 minutes, Initiate strength  training 2-3 days a week, and Establish a home exercise program before discharge    Exercise Interventions/Education:   *EXERCISE FUNDAMENTALS AND MAINTENANCE HANDOUT  *REVIEW THR AND INSTRUCTIONS FOR RADIAL PULSE CHECK     Initial Assessment: [facility] COMPLETED/ EST SCHEDULED  REASSESSMENT: EST COMPLETED. THR  REVIEWED WITH PT. PT ACHIEVING A MET LEVEL OF 2.8 ON RECUMBENT BIKE AT MOST RECENT SESSION. TOLERATING INCREASED DURATION AND MET LEVEL WELL.          Other Core Components/Risk Factor Assessment:    Medication adherence  Current Medications:   Medication Documentation Review Audit       Reviewed by Yuniel De La Cruz MA (Medical Assistant) on 05/08/24 at 1444      Medication Order Taking? Sig Documenting Provider Last Dose Status   aspirin 81 mg EC tablet [id number] Yes Take 1 tablet (81 mg) by mouth once daily. Continue until INR is in therapeutic range and then can discontinue. Kashif Ellis, DO Taking Active   atorvastatin (Lipitor) 40 mg tablet [id number] Yes Take 1 tablet (40 mg) by mouth once daily at bedtime. Kashif Ellis, DO Taking Active   blood glucose control, normal solution [id number] Yes Use as directed Historical Provider, MD Taking Active   cetirizine (ZYRTEC) 10 mg capsule [id number] Yes Take 1 capsule (10 mg) by mouth once daily in the morning. Historical Provider, MD Taking Active   cholecalciferol (Vitamin D-3) 50 mcg (2,000 unit) capsule [id number] Yes Take 1 capsule (50 mcg) by mouth early in the morning.. Historical Provider, MD Taking Active   FLUoxetine (PROzac) 20 mg capsule [id number] Yes Take 1 capsule (20 mg) by mouth once daily.   Patient taking differently: Take 1 capsule (20 mg) by mouth once daily in the morning.    Karl Mock, DO Taking Active   fluticasone propion-salmeteroL (Advair Diskus) 250-50 mcg/dose diskus inhaler [id number] Yes Inhale 1 puff 2 times a day. During summer (grass cutting) Karl Mock, DO Taking Differently Active   folic acid (Folvite) 1 mg tablet  [id number] Yes Take 1 tablet (1 mg) by mouth once daily.   Patient taking differently: Take 1 tablet (1 mg) by mouth once daily in the morning.    Karl Mock, DO Taking Active   furosemide (Lasix) 20 mg tablet [id number] Yes Take 1 tablet (20 mg) by mouth 4 times a week. Take every Mon, Wed, Fri, and Sat Historical Provider, MD Taking Active   HYDROcodone-acetaminophen (Norco) 5-325 mg tablet [id number] Yes Take 1 tablet by mouth 3 times a day as needed for moderate pain (4 - 6) for up to 28 days. Do not start before April 10, 2024. Alma Alfaro, APRN-CNP Taking Differently Active   Jardiance 25 mg [id number] Yes TAKE 1 TABLET BY MOUTH DAILY Karl Mock, DO Taking Active   krill-om3-dha-epa-om6-lip-astx (Krill Oil, Omega 3 and 6,) 1,500-165-67.5 mg capsule [id number] Yes Take 1 capsule by mouth once daily. Historical Provider, MD Taking Active   lancets 33 gauge misc [id number] Yes 1 each by percutaneous route once daily. Test BS daily Karl Mock, DO Taking Active   leflunomide (Arava) 20 mg tablet [id number] Yes Take 1 tablet by mouth once daily Jean Polanco, DO Taking Active   levothyroxine (Synthroid, Levoxyl) 137 mcg tablet [id number] Yes Take 1 tablet (137 mcg) by mouth once daily. Karl Mock,  Taking Active   losartan (Cozaar) 100 mg tablet [id number] Yes Take 1 tablet (100 mg) by mouth once daily. Karl Mock,  Taking Active   metFORMIN (Glucophage) 850 mg tablet [id number] Yes Take 1 tablet (850 mg) by mouth 2 times a day with meals. Karl Mock,  Taking Active   metoprolol tartrate (Lopressor) 25 mg tablet [id number] Yes Take 1 tablet (25 mg) by mouth 2 times a day. Kashif Ellis, DO Taking Active   multivit-min/FA/lycopen/lutein (CENTRUM SILVER MEN ORAL) [id number] Yes Take 1 tablet by mouth once daily. Historical Provider, MD Taking Active   nitroglycerin (Nitrostat) 0.4 mg SL tablet [id number] Yes Place 1 tablet (0.4 mg) under the tongue every 5 minutes if needed for chest pain.  Historical Provider, MD Taking Differently Active   OneTouch Ultra Test strip [id number] Yes Test blood glucose once daily Karl Mock, DO Taking Active   pregabalin (Lyrica) 200 mg capsule [id number] Yes Take 1 capsule (200 mg) by mouth once daily at bedtime. Last OARRS fill: 1/23/24 #90 for 90 days Jean Polanco, DO Taking Active   ticagrelor (Brilinta) 90 mg tablet [id number] Yes Take 1 tablet (90 mg) by mouth 2 times a day. Jean Polanco,  Taking Active   warfarin (Coumadin) 5 mg tablet [id number] Yes TAKE 1 AND 1/2 TABLETS BY MOUTH  5 TIMES WEEKLY AND 1 TABLET BY  MOUTH TWICE WEEKLY DAILY OR AS  DIRECTED AFTER INR TESTING. DO  NOT START BEFORE MARCH 11, 2023   Patient taking differently: Take 1 tablet (5 mg) by mouth 2 times a week. On Tuesday and Friday evenings    Karl Mock, DO Taking Active   warfarin (Coumadin) 5 mg tablet [id number] Yes Take 1.5 tablets (7.5 mg) by mouth 5 times a week. On Sunday, Monday, Wednesday, Thursday, Saturday evenings Historical Provider, MD Taking Active                                 Medication compliance: Yes   Uses pill box/organizer: Yes    Carries medication list: Yes     Blood Pressure Management  History of Hypertension: Yes   Medication Changes: No   Resting BP: 118/62    Heart Failure Management  Hx of Heart Failure: Yes;   Type (selection): HFpEF  Most recent EF:   55-60%    Onset of heart failure diagnosis: 2003  Last heart failure hospitalization: NA  Number of HF admissions per year: 0    Symptoms: Fatigue with exertion  Is there a family Hx of HF: Yes   Does patient obtain daily weight No         Heart Failure Reassessment: Reassessed every 30 days while in program.   Unplanned MD and/or ED Heart Failure visit: No  Hospitalization since starting program/last assessment: No  MD contacted regarding Heart Failure symptoms: No    Heart Failure Goals: Able to verbalize signs and symptoms of fluid retention and when to contact MD, Adhere to proper fluid  restrictions, Adapt a low sodium diet and verbalize guidelines, and Obtain daily weight and verbalize proper method of obtaining weights    Smoking/Tobacco Assessment  Social History     Tobacco Use   Smoking Status Former    Types: Cigarettes   Smokeless Tobacco Never       Other Core Component Plan    Goal Status: Initial Assessment; goals not yet started    Other Core Component Goals: Medication compliance, Verbalize medication usage and drug actions by discharge, and Achieve resting BP of < 130/80 by discharge    Other Core Component Interventions/Education:   *EDUCATION: CAD, HYPERTENSION AND STROKE, RISK FACTORS FOR HEART DISEASE, UNDERSTANDING HEART VALVE DISEASE, UNDERSTANDING CHF.  *PROVIDE MED EDUCATION TOOL  *PROVIDE MED LIST IF NEEDED     Initial Assessment: KNOWLEDGE QUIZ COMPLETED 11/15  REASSESSMENT: 6/12/2024 EDUCATIONAL HANDOUT ON HYPERTENSION & STROKE PROVIDED AND REVIEWED WITH PATIENT.    Individual Patient Goals:    COMPLETE ADL'S WITHOUT FEELING SHORT OF BREATH  LOSE 1-2 LBS PER WEEK WHILE IN THE PROGRAM    Goal Status: In progress    Staff Comments:  ADVANCED DIRECTIVES ADDRESSED WITH PATIENT- STATES POA AND LIVING WILL IN PLACE.  DEMONSTRATES SAFE AND APPROPRIATE USE OF EQUIPMENT/ MAINTAINS SAFETY DURING EACH SESSION.  Rehab Staff Signature: Kanika Vega RN

## 2024-06-14 ENCOUNTER — CLINICAL SUPPORT (OUTPATIENT)
Dept: CARDIAC REHAB | Facility: HOSPITAL | Age: 74
End: 2024-06-14
Payer: MEDICARE

## 2024-06-14 DIAGNOSIS — I21.4 NSTEMI (NON-ST ELEVATED MYOCARDIAL INFARCTION) (MULTI): ICD-10-CM

## 2024-06-14 DIAGNOSIS — Z95.5 STENTED CORONARY ARTERY: ICD-10-CM

## 2024-06-14 PROCEDURE — 93798 PHYS/QHP OP CAR RHAB W/ECG: CPT

## 2024-06-17 ENCOUNTER — CLINICAL SUPPORT (OUTPATIENT)
Dept: CARDIAC REHAB | Facility: HOSPITAL | Age: 74
End: 2024-06-17
Payer: MEDICARE

## 2024-06-17 DIAGNOSIS — I21.4 NSTEMI (NON-ST ELEVATED MYOCARDIAL INFARCTION) (MULTI): ICD-10-CM

## 2024-06-17 DIAGNOSIS — Z95.5 STENTED CORONARY ARTERY: ICD-10-CM

## 2024-06-17 PROCEDURE — 93798 PHYS/QHP OP CAR RHAB W/ECG: CPT | Performed by: INTERNAL MEDICINE

## 2024-06-19 ENCOUNTER — CLINICAL SUPPORT (OUTPATIENT)
Dept: CARDIAC REHAB | Facility: HOSPITAL | Age: 74
End: 2024-06-19
Payer: MEDICARE

## 2024-06-19 DIAGNOSIS — I21.4 NSTEMI (NON-ST ELEVATED MYOCARDIAL INFARCTION) (MULTI): ICD-10-CM

## 2024-06-19 DIAGNOSIS — Z95.5 STENTED CORONARY ARTERY: ICD-10-CM

## 2024-06-19 PROCEDURE — 93798 PHYS/QHP OP CAR RHAB W/ECG: CPT | Performed by: INTERNAL MEDICINE

## 2024-06-21 ENCOUNTER — CLINICAL SUPPORT (OUTPATIENT)
Dept: CARDIAC REHAB | Facility: HOSPITAL | Age: 74
End: 2024-06-21
Payer: MEDICARE

## 2024-06-21 DIAGNOSIS — Z95.5 STENTED CORONARY ARTERY: ICD-10-CM

## 2024-06-21 DIAGNOSIS — I21.4 NSTEMI (NON-ST ELEVATED MYOCARDIAL INFARCTION) (MULTI): ICD-10-CM

## 2024-06-21 PROCEDURE — 93798 PHYS/QHP OP CAR RHAB W/ECG: CPT

## 2024-06-24 ENCOUNTER — CLINICAL SUPPORT (OUTPATIENT)
Dept: CARDIAC REHAB | Facility: HOSPITAL | Age: 74
End: 2024-06-24
Payer: MEDICARE

## 2024-06-24 DIAGNOSIS — Z95.5 STENTED CORONARY ARTERY: ICD-10-CM

## 2024-06-24 DIAGNOSIS — I21.4 NSTEMI (NON-ST ELEVATED MYOCARDIAL INFARCTION) (MULTI): ICD-10-CM

## 2024-06-24 PROCEDURE — 93798 PHYS/QHP OP CAR RHAB W/ECG: CPT

## 2024-06-26 ENCOUNTER — CLINICAL SUPPORT (OUTPATIENT)
Dept: CARDIAC REHAB | Facility: HOSPITAL | Age: 74
End: 2024-06-26
Payer: MEDICARE

## 2024-06-26 DIAGNOSIS — I21.4 NSTEMI (NON-ST ELEVATED MYOCARDIAL INFARCTION) (MULTI): ICD-10-CM

## 2024-06-26 DIAGNOSIS — Z95.5 STENTED CORONARY ARTERY: ICD-10-CM

## 2024-06-26 PROCEDURE — 93798 PHYS/QHP OP CAR RHAB W/ECG: CPT | Performed by: INTERNAL MEDICINE

## 2024-06-27 ENCOUNTER — PATIENT OUTREACH (OUTPATIENT)
Dept: PRIMARY CARE | Facility: CLINIC | Age: 74
End: 2024-06-27
Payer: COMMERCIAL

## 2024-06-27 ENCOUNTER — TELEPHONE (OUTPATIENT)
Dept: CARDIOLOGY | Facility: CLINIC | Age: 74
End: 2024-06-27

## 2024-06-27 NOTE — TELEPHONE ENCOUNTER
[place] called to report pt had heart attack in April and since that time he has been exhausted, no energy. [place] states pt went to cardiac rehab yesterday and had some chest pressure during exercise but was told by the staff that everything looked ok on the monitors.    [place] states pt c/o chest discomfort yesterday afternoon located on left side above heart. Per [place], pt refused to take Nitroglycerin and chest discomfort resolved. Difficult to assess as pt was not available to discuss chest discomfort symptoms.    Per [place], pt HR has been in the 40's.    BP's 120-130's/70's, HR 40's.    HR at cardiac rehab yesterday was 57 and HR 62 at ov with Dr. Goins on 5/8/24.    Meds: Metoprolol tartrate 25mg twice a day, Rosuvastatin 20mg, Clopidogrel, Nitroglycerin, Losartan 100mg daily, Coumadin.    Please advise. Thanks.

## 2024-06-28 ENCOUNTER — CLINICAL SUPPORT (OUTPATIENT)
Dept: CARDIAC REHAB | Facility: HOSPITAL | Age: 74
End: 2024-06-28
Payer: MEDICARE

## 2024-06-28 ENCOUNTER — OFFICE VISIT (OUTPATIENT)
Dept: PAIN MEDICINE | Facility: CLINIC | Age: 74
End: 2024-06-28
Payer: MEDICARE

## 2024-06-28 VITALS
DIASTOLIC BLOOD PRESSURE: 69 MMHG | RESPIRATION RATE: 20 BRPM | SYSTOLIC BLOOD PRESSURE: 165 MMHG | HEART RATE: 51 BPM | OXYGEN SATURATION: 96 %

## 2024-06-28 DIAGNOSIS — Z95.5 STENTED CORONARY ARTERY: ICD-10-CM

## 2024-06-28 DIAGNOSIS — M48.062 NEUROGENIC CLAUDICATION DUE TO LUMBAR SPINAL STENOSIS: ICD-10-CM

## 2024-06-28 DIAGNOSIS — M47.26 OSTEOARTHRITIS OF SPINE WITH RADICULOPATHY, LUMBAR REGION: ICD-10-CM

## 2024-06-28 DIAGNOSIS — M51.16 LUMBAR DISC HERNIATION WITH RADICULOPATHY: Primary | ICD-10-CM

## 2024-06-28 DIAGNOSIS — I21.4 NSTEMI (NON-ST ELEVATED MYOCARDIAL INFARCTION) (MULTI): ICD-10-CM

## 2024-06-28 PROCEDURE — 1160F RVW MEDS BY RX/DR IN RCRD: CPT | Performed by: NURSE PRACTITIONER

## 2024-06-28 PROCEDURE — 99213 OFFICE O/P EST LOW 20 MIN: CPT | Performed by: NURSE PRACTITIONER

## 2024-06-28 PROCEDURE — 1036F TOBACCO NON-USER: CPT | Performed by: NURSE PRACTITIONER

## 2024-06-28 PROCEDURE — 93798 PHYS/QHP OP CAR RHAB W/ECG: CPT

## 2024-06-28 PROCEDURE — 1125F AMNT PAIN NOTED PAIN PRSNT: CPT | Performed by: NURSE PRACTITIONER

## 2024-06-28 PROCEDURE — 3008F BODY MASS INDEX DOCD: CPT | Performed by: NURSE PRACTITIONER

## 2024-06-28 PROCEDURE — 3048F LDL-C <100 MG/DL: CPT | Performed by: NURSE PRACTITIONER

## 2024-06-28 PROCEDURE — 4010F ACE/ARB THERAPY RXD/TAKEN: CPT | Performed by: NURSE PRACTITIONER

## 2024-06-28 PROCEDURE — 3078F DIAST BP <80 MM HG: CPT | Performed by: NURSE PRACTITIONER

## 2024-06-28 PROCEDURE — 3077F SYST BP >= 140 MM HG: CPT | Performed by: NURSE PRACTITIONER

## 2024-06-28 PROCEDURE — 1159F MED LIST DOCD IN RCRD: CPT | Performed by: NURSE PRACTITIONER

## 2024-06-28 RX ORDER — HYDROCODONE BITARTRATE AND ACETAMINOPHEN 5; 325 MG/1; MG/1
1 TABLET ORAL 3 TIMES DAILY PRN
Qty: 84 TABLET | Refills: 0 | Status: SHIPPED | OUTPATIENT
Start: 2024-07-08 | End: 2024-08-05

## 2024-06-28 RX ORDER — HYDROCODONE BITARTRATE AND ACETAMINOPHEN 5; 325 MG/1; MG/1
1 TABLET ORAL 3 TIMES DAILY PRN
Qty: 84 TABLET | Refills: 0 | Status: SHIPPED | OUTPATIENT
Start: 2024-09-02 | End: 2024-09-30

## 2024-06-28 RX ORDER — HYDROCODONE BITARTRATE AND ACETAMINOPHEN 5; 325 MG/1; MG/1
1 TABLET ORAL 3 TIMES DAILY PRN
Qty: 84 TABLET | Refills: 0 | Status: SHIPPED | OUTPATIENT
Start: 2024-08-05 | End: 2024-09-02

## 2024-06-28 ASSESSMENT — ENCOUNTER SYMPTOMS
MYALGIAS: 1
HEADACHES: 0
DIZZINESS: 0
NECK PAIN: 0
SEIZURES: 0
RESPIRATORY NEGATIVE: 1
ARTHRALGIAS: 1
GASTROINTESTINAL NEGATIVE: 1
NECK STIFFNESS: 0
JOINT SWELLING: 0
SPEECH DIFFICULTY: 0
CARDIOVASCULAR NEGATIVE: 1
ENDOCRINE NEGATIVE: 1
ALLERGIC/IMMUNOLOGIC NEGATIVE: 1
FACIAL ASYMMETRY: 0
NUMBNESS: 0
EYES NEGATIVE: 1
HEMATOLOGIC/LYMPHATIC NEGATIVE: 1
LIGHT-HEADEDNESS: 0
BACK PAIN: 1
CONSTITUTIONAL NEGATIVE: 1
WEAKNESS: 0
PSYCHIATRIC NEGATIVE: 1
TREMORS: 0

## 2024-06-28 ASSESSMENT — PAIN - FUNCTIONAL ASSESSMENT: PAIN_FUNCTIONAL_ASSESSMENT: 0-10

## 2024-06-28 ASSESSMENT — PAIN SCALES - GENERAL: PAINLEVEL_OUTOF10: 6

## 2024-06-28 ASSESSMENT — PAIN DESCRIPTION - DESCRIPTORS: DESCRIPTORS: ACHING

## 2024-06-28 NOTE — PROGRESS NOTES
Subjective     Shabbir Laurent is a 73 y.o. year old male patient here for chronic pain management.     Follows up for interval reevaluation of his chronic low back pain from lumbar stenosis with neurogenic claudication, degenerative disc and degenerative spondylolisthesis.    Bilateral L3 transforaminal lumbar epidural steroid injection November 1, 2023 reduce pain and improve function up to 50%.  No longer has radiating right-sided greater than left-sided low back pain that radiates to the right and left anterolateral hip and thigh with numbness and weakness.  Injection therapy has helped reduce pain and improve function with standing and walking, walking up and down stairs, getting in and out of the car.      Since last office visit, Jamie did have a non-STEMI April 18, 2024.  He was started on Plavix.  Brilinta was discontinued due to the side effect of shortness of breath.    In the event he has return of pain to the back and the legs discussed with him that Dr. Goins, his cardiologist, may not discontinue the Plavix between 6 months and a year.  If he cannot come off the Plavix for injection therapy then I will increase his pain medication and add oral prednisone to help reduce pain and improve function.  Jamie continues pregabalin 200 mg once daily from Dr. Polanco.    MRI of lumbar spine August 27, 2023.  Which is with stable degenerative changes most pronounced at L2-L3 with superimposed inferior directed right revision, facet hypertrophy, ligament flavum thickening and prominent epidural fat with severe spinal canal narrowing and left and severe right neuroforaminal narrowing, similar to previous.      Norco 5 mg 3 times daily reduces pain and improves function up to 60%.  Average pain score is 6 out of 10.  Denies any side effects from medication.  Has an average quality of family and social life with current condition and treatment.    Toxicology consistent April 2, 2024.  Annual controlled substance  agreement and opioid risk tool are completed and scanned into the chart April 2, 2024.    For continuity:   Given the patient's report of reduced pain and improved functional ability without adverse effects, it is reasonable to treat with narcotic medications. The terms of the opioid agreement as well as the potential risks and adverse effects of the patient's medication regimen were discussed in detail. This includes if applicable due to dosage of medication permission to discuss and coordinate care with other treatment providers relevant to the patients condition. The patient verbalized understanding.     Risks and side effects of chronic opioid therapy including but not limited to tolerance, dependence, constipation, hyperalgesia, cognitive side effects, addiction and possible death due to overuse and or misuse were discussed. I also discussed that such medications when co-administered with other sedative agents including but not limited to alcohol, benzodiazepines, sedative hypnotics and illegal drugs could pose life threatening consequences including death. I also explained the impact that the administration of such medication has on a patient with obstructive sleep apnea and continued recommendations for use of apnea devices if ordered are prescribed by other physicians. In order to effectively and safely treat the pain, I also emphasized the importance of compliance with the treatment plan, as well as compliance with the terms of the opioid agreement, which was reviewed in detail. I explained the importance of being responsible with the medications and to take these only as prescribed, never in excess and never for reasons other than pain reduction. The patient was counseled on keeping the medications safe and locked away from children and other adults as well as disposal methods and options. The patient understood the risks and instructions.     I also discussed with the patient in detail that based on the  clinical response to the opioid medications and improvements of activities of daily living, sleep, and work performance in light of compliance with the treatment plan we can continue this form of therapy for the above chronic pain. The goal and rationale used for current treatment with chronic opioid medication is to control the pain and alleviate disability induced by the chronic pain condition noted above after failures of other non-opioid and nonpharmacological modalities to treat the chronic pain and the symptoms associated with have failed. The patient understood the goals in terms of the above treatment plan and had no further questions prior to leaving the office today.     Of note, the above-mentioned diagnoses/conditions and expected fluctuating nature of pain, and pain characteristic changes may lead to prolonged functional impairment requiring frequent and multiple reassessments with continued high level medical decision making. As noted, medication and medication management may require opiate therapy in excess of a routine less than 30 day medication requirement. The patient may require daily opiate therapy necessitating month-long prescription medication as noted above in order to perform activities of daily living and achieve acceptable quality of life with respect to their chronic pain condition for the foreseeable future. We monitor our patient's carefully through drug monitoring, medication counts, urine drug testing specific to their medication as well as a myriad of other substances and with frequent follow-ups with interval reassement of the chronic pain condition, its pathophysiology and prognosis.     The level of clinical decision making at this office visit is high due to high risks and complications including mortality and morbidity related to acute and chronic pain with respects to life, bodily function, and treatment. Risks and clinical decisions with respect to under treatment, failure to  maintain adequate treatment, and/or overtreatment complications and outcomes were discussed with the patient with respect to their chronic pain conditions, interventional therapies, as well as the use of various medications including possible controlled/dangerous medications. The amount and complexity of reviewed data at this in subsequent office visits is high given patient's fluctuating clinical presentation, laboratory and radiographic reports, prescription monitoring program data, and medication history as well as other relevant data as noted above. Pertinent negatives and positives data was used in consideration for the above-mentioned high complexity.      Given the patient's total MED, general use of daily opiates, or other coadministered medications in various classes the patient was offered a prescription for Narcan. I instructed the patient that it is important that patient fill this medication in order to demonstrate understanding of the gravity of possible side effects including respiratory depression and risk of overdose of this opiate load or medication combination. As such patient will be required to bring Narcan prescription to follow-up appointments as part of compliance with continued opiate care.     With respect to opiate induced constipation I discussed multiple ways to combat this problem including staying hydrated and taking over-the-counter medications such as Dulcolax, Miralax and Senna. If these treatments are not effective we could consider such medications as Amitiza, Linzess and Movantik.     Disclaimer: This note was transcribed using an audio transcription device. As such, minor errors may be present with regard to spelling, punctuation, and inadvertent word insertion. Please disregard such errors.    Narrative   Interpreted By:  SLAVA GUERIN MD  MRN: [id number]  Patient Name: GIORGIO EDWARDS     STUDY:  HIP, BILATERAL W/PELVIS WHEN PERFORMED 3-4 VIEWS;  8/27/2023 10:57 am      INDICATION:  Persistent low back pain and radiating hip and pelvis pain and groin  pain on the right.  Can radiate towards the knee.  Sharp and  stabbing.  More persistent and intense over the last 6 months.  Is  with weakness with weightbearing activities.  No fall  M16.12:  Arthritis of left hip.     COMPARISON:  Left hip 12/15/2015     ACCESSION NUMBER(S):  [id number]     ORDERING CLINICIAN:  AVELINA MELO     FINDINGS:  Four views bilateral hips:     Left hip:  There is progressive osteoarthritis. There is moderate spurring of  the superolateral and inferomedial aspect of the left acetabulum.  There is no fracture or dislocation. There is no acute bony  abnormality.     Right hip:  There is osteoarthritis.  There is moderate spurring of the superolateral and inferomedial  aspects of the left acetabulum.  There is narrowing of the superolateral aspect of the joint.  There is no fracture or dislocation.      Impression   Bilateral hip osteoarthritis with no acute bony abnormality.     Narrative   Interpreted By:  WILLY PEREZ MD, PHD  MRN: [id number]  Patient Name: GIORGIO EDWARDS     STUDY:  MRI L-SPINE WO;  8/27/2023 10:40 am     INDICATION:  Persistent low back pain and radiating hip and pelvis pain and groin  pain on the right.  Can radiate towards the knee.  Sharp and  stabbing.  More persistent and intense over the last 6 months.  Weakness with weightbearing activities.  No fall     COMPARISON:  Lumbar spine radiographs, same day and lumbar spine MRI, 03/12/2021     ACCESSION NUMBER(S):  [id number]     ORDERING CLINICIAN:  AVELINA MELO     TECHNIQUE:  Sagittal T1, T2, STIR, axial T1 and T2 weighted images of the lumbar  spine were acquired.     FINDINGS:  5 lumbar-type vertebral bodies are again noted, the last well-formed  disc space is labeled L5-S1.     Alignment: Mild retrolisthesis at L1-2, L2-3, and L3-4 and grade 1  anterolisthesis at L4-5, similar to previous.     Vertebrae/Intervertebral Discs: The  vertebral bodies demonstrate  expected height. Multilevel degenerative endplate changes with  associated marrow edema at L2-3. Nodular T1 hyperintense signal at L1  is unchanged from previous and may represent focal fatty marrow or a  benign hemangioma. Multilevel loss of normal disc T2 signal intensity  and disc height.     Conus: The lower thoracic cord appears unremarkable. The conus  terminates at L1. The cauda equina is unremarkable.     T12-L1: Disc bulge and facet hypertrophy. No canal or foraminal  narrowing.     L1-2: Disc bulge, facet hypertrophy, and ligamentum flavum thickening  with diffuse mild spinal canal narrowing and mild bilateral neural  foramen narrowing, similar to previous.     L2-3: Disc bulge with a superimposed inferiorly directed right  central disc extrusion, facet hypertrophy, ligamentum flavum  thickening, and prominent epidural fat with severe spinal canal  narrowing and moderate left and severe right neural foramen  narrowing, similar to previous.     L3-4: Disc bulge, facet hypertrophy, ligamentum flavum thickening,  and prominent epidural fat with diffuse moderate spinal canal  narrowing and moderate bilateral neural foramen narrowing, similar to  previous.     L4-5: Disc bulge, facet hypertrophy, and ligamentum flavum thickening  with diffuse moderate spinal canal narrowing and mild right and  moderate left neural foramen narrowing, similar to previous.     L5-S1: Disc bulge, facet hypertrophy, and ligamentum flavum  thickening with mild left neural foramen narrowing, similar to  previous.     Marked paraspinal muscular atrophy, the paraspinal soft tissues are  otherwise unremarkable. Partially visualized distension of the  bladder.      Impression   Stable degenerative changes most pronounced at L2-3.       Review of Systems   Constitutional: Negative.    HENT: Negative.     Eyes: Negative.    Respiratory: Negative.     Cardiovascular: Negative.    Gastrointestinal: Negative.     Endocrine: Negative.    Genitourinary: Negative.    Musculoskeletal:  Positive for arthralgias, back pain, gait problem and myalgias. Negative for joint swelling, neck pain and neck stiffness.   Skin: Negative.    Allergic/Immunologic: Negative.    Neurological:  Negative for dizziness, tremors, seizures, syncope, facial asymmetry, speech difficulty, weakness, light-headedness, numbness and headaches.   Hematological: Negative.    Psychiatric/Behavioral: Negative.         Objective   Physical Exam  Vitals and nursing note reviewed.   Constitutional:       Appearance: Normal appearance.   HENT:      Head: Normocephalic and atraumatic.   Eyes:      Conjunctiva/sclera: Conjunctivae normal.   Cardiovascular:      Pulses: Normal pulses.   Pulmonary:      Effort: Pulmonary effort is normal. No respiratory distress.   Musculoskeletal:      Right lower leg: No edema.      Left lower leg: No edema.      Comments: Standing erect and lumbar spine in extension increased back pain.  Flexion relieves back pain.    Ambulates with mildly antalgic gait.   Skin:     General: Skin is warm and dry.      Capillary Refill: Capillary refill takes 2 to 3 seconds.   Neurological:      General: No focal deficit present.      Mental Status: He is alert.      Cranial Nerves: No cranial nerve deficit.      Sensory: No sensory deficit.      Motor: No weakness.      Gait: Gait normal.      Comments: Negative straight leg raise.  No clonus   Psychiatric:         Mood and Affect: Mood normal.         Behavior: Behavior normal.     Shabbir was seen today for back pain.  Diagnoses and all orders for this visit:  Lumbar disc herniation with radiculopathy (Primary)  -     HYDROcodone-acetaminophen (Norco) 5-325 mg tablet; Take 1 tablet by mouth 3 times a day as needed for severe pain (7 - 10) for up to 28 days. Do not fill before July 8, 2024.  -     HYDROcodone-acetaminophen (Norco) 5-325 mg tablet; Take 1 tablet by mouth 3 times a day as needed for  severe pain (7 - 10) for up to 28 days. Do not fill before August 5, 2024.  -     HYDROcodone-acetaminophen (Norco) 5-325 mg tablet; Take 1 tablet by mouth 3 times a day as needed for severe pain (7 - 10) for up to 28 days. Do not fill before September 2, 2024.  Neurogenic claudication due to lumbar spinal stenosis  -     HYDROcodone-acetaminophen (Norco) 5-325 mg tablet; Take 1 tablet by mouth 3 times a day as needed for severe pain (7 - 10) for up to 28 days. Do not fill before July 8, 2024.  -     HYDROcodone-acetaminophen (Norco) 5-325 mg tablet; Take 1 tablet by mouth 3 times a day as needed for severe pain (7 - 10) for up to 28 days. Do not fill before August 5, 2024.  -     HYDROcodone-acetaminophen (Norco) 5-325 mg tablet; Take 1 tablet by mouth 3 times a day as needed for severe pain (7 - 10) for up to 28 days. Do not fill before September 2, 2024.  Osteoarthritis of spine with radiculopathy, lumbar region  -     HYDROcodone-acetaminophen (Norco) 5-325 mg tablet; Take 1 tablet by mouth 3 times a day as needed for severe pain (7 - 10) for up to 28 days. Do not fill before July 8, 2024.  -     HYDROcodone-acetaminophen (Norco) 5-325 mg tablet; Take 1 tablet by mouth 3 times a day as needed for severe pain (7 - 10) for up to 28 days. Do not fill before August 5, 2024.  -     HYDROcodone-acetaminophen (Norco) 5-325 mg tablet; Take 1 tablet by mouth 3 times a day as needed for severe pain (7 - 10) for up to 28 days. Do not fill before September 2, 2024.       Follow-up in 12 weeks.    Alma RENTERIA-CNP

## 2024-07-01 ENCOUNTER — CLINICAL SUPPORT (OUTPATIENT)
Dept: CARDIAC REHAB | Facility: HOSPITAL | Age: 74
End: 2024-07-01
Payer: MEDICARE

## 2024-07-01 ENCOUNTER — APPOINTMENT (OUTPATIENT)
Dept: PAIN MEDICINE | Facility: CLINIC | Age: 74
End: 2024-07-01
Payer: COMMERCIAL

## 2024-07-01 DIAGNOSIS — I21.4 NSTEMI (NON-ST ELEVATED MYOCARDIAL INFARCTION) (MULTI): ICD-10-CM

## 2024-07-01 DIAGNOSIS — Z95.5 STENTED CORONARY ARTERY: ICD-10-CM

## 2024-07-01 PROCEDURE — 93798 PHYS/QHP OP CAR RHAB W/ECG: CPT

## 2024-07-01 NOTE — PROGRESS NOTES
Daily Cardiopulmonary Rehab Visit      Routing refill request to provider for review/approval because:  Drug not on the [facility] refill protocol

## 2024-07-03 ENCOUNTER — CLINICAL SUPPORT (OUTPATIENT)
Dept: CARDIAC REHAB | Facility: HOSPITAL | Age: 74
End: 2024-07-03
Payer: MEDICARE

## 2024-07-03 DIAGNOSIS — I21.4 NSTEMI (NON-ST ELEVATED MYOCARDIAL INFARCTION) (MULTI): ICD-10-CM

## 2024-07-03 DIAGNOSIS — Z95.5 STENTED CORONARY ARTERY: ICD-10-CM

## 2024-07-03 DIAGNOSIS — I95.89: ICD-10-CM

## 2024-07-03 PROCEDURE — 93798 PHYS/QHP OP CAR RHAB W/ECG: CPT | Performed by: INTERNAL MEDICINE

## 2024-07-03 RX ORDER — METOPROLOL TARTRATE 25 MG/1
25 TABLET, FILM COATED ORAL DAILY
Qty: 90 TABLET | Refills: 3 | COMMUNITY
Start: 2024-07-03

## 2024-07-05 ENCOUNTER — CLINICAL SUPPORT (OUTPATIENT)
Dept: CARDIAC REHAB | Facility: HOSPITAL | Age: 74
End: 2024-07-05
Payer: MEDICARE

## 2024-07-05 DIAGNOSIS — Z95.5 STENTED CORONARY ARTERY: ICD-10-CM

## 2024-07-05 DIAGNOSIS — I21.4 NSTEMI (NON-ST ELEVATED MYOCARDIAL INFARCTION) (MULTI): ICD-10-CM

## 2024-07-05 PROCEDURE — 93798 PHYS/QHP OP CAR RHAB W/ECG: CPT

## 2024-07-08 ENCOUNTER — DOCUMENTATION (OUTPATIENT)
Dept: CARDIAC REHAB | Facility: HOSPITAL | Age: 74
End: 2024-07-08

## 2024-07-08 ENCOUNTER — CLINICAL SUPPORT (OUTPATIENT)
Dept: CARDIAC REHAB | Facility: HOSPITAL | Age: 74
End: 2024-07-08
Payer: MEDICARE

## 2024-07-08 DIAGNOSIS — Z95.5 STENTED CORONARY ARTERY: ICD-10-CM

## 2024-07-08 DIAGNOSIS — I21.4 NSTEMI (NON-ST ELEVATED MYOCARDIAL INFARCTION) (MULTI): ICD-10-CM

## 2024-07-08 PROCEDURE — 93798 PHYS/QHP OP CAR RHAB W/ECG: CPT | Performed by: INTERNAL MEDICINE
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
## 2024-07-09 ENCOUNTER — APPOINTMENT (OUTPATIENT)
Dept: PRIMARY CARE | Facility: CLINIC | Age: 74
End: 2024-07-09
Payer: MEDICARE

## 2024-07-09 DIAGNOSIS — Z95.2 HISTORY OF HEART VALVE REPLACEMENT WITH MECHANICAL VALVE: Primary | ICD-10-CM

## 2024-07-09 LAB
POC INR: 2.9
POC PROTHROMBIN TIME: NORMAL

## 2024-07-09 PROCEDURE — 85610 PROTHROMBIN TIME: CPT | Performed by: FAMILY MEDICINE

## 2024-07-10 ENCOUNTER — CLINICAL SUPPORT (OUTPATIENT)
Dept: CARDIAC REHAB | Facility: HOSPITAL | Age: 74
End: 2024-07-10
Payer: MEDICARE

## 2024-07-10 DIAGNOSIS — Z95.5 STENTED CORONARY ARTERY: ICD-10-CM

## 2024-07-10 DIAGNOSIS — I21.4 NSTEMI (NON-ST ELEVATED MYOCARDIAL INFARCTION) (MULTI): ICD-10-CM

## 2024-07-10 PROCEDURE — 93798 PHYS/QHP OP CAR RHAB W/ECG: CPT | Performed by: INTERNAL MEDICINE

## 2024-07-12 ENCOUNTER — CLINICAL SUPPORT (OUTPATIENT)
Dept: CARDIAC REHAB | Facility: HOSPITAL | Age: 74
End: 2024-07-12
Payer: MEDICARE

## 2024-07-12 DIAGNOSIS — I21.4 NSTEMI (NON-ST ELEVATED MYOCARDIAL INFARCTION) (MULTI): ICD-10-CM

## 2024-07-12 DIAGNOSIS — Z95.5 STENTED CORONARY ARTERY: ICD-10-CM

## 2024-07-12 PROCEDURE — 93798 PHYS/QHP OP CAR RHAB W/ECG: CPT

## 2024-07-13 DIAGNOSIS — M06.4 INFLAMMATORY POLYARTHROPATHY (MULTI): ICD-10-CM

## 2024-07-13 DIAGNOSIS — M13.0 POLYARTHRITIS: ICD-10-CM

## 2024-07-14 RX ORDER — LEFLUNOMIDE 20 MG/1
20 TABLET ORAL DAILY
Qty: 90 TABLET | Refills: 0 | Status: SHIPPED | OUTPATIENT
Start: 2024-07-14

## 2024-07-15 ENCOUNTER — CLINICAL SUPPORT (OUTPATIENT)
Dept: CARDIAC REHAB | Facility: HOSPITAL | Age: 74
End: 2024-07-15
Payer: MEDICARE

## 2024-07-15 DIAGNOSIS — I21.4 NSTEMI (NON-ST ELEVATED MYOCARDIAL INFARCTION) (MULTI): ICD-10-CM

## 2024-07-15 DIAGNOSIS — Z95.5 STENTED CORONARY ARTERY: ICD-10-CM

## 2024-07-15 PROCEDURE — 93798 PHYS/QHP OP CAR RHAB W/ECG: CPT

## 2024-07-17 ENCOUNTER — CLINICAL SUPPORT (OUTPATIENT)
Dept: CARDIAC REHAB | Facility: HOSPITAL | Age: 74
End: 2024-07-17
Payer: MEDICARE

## 2024-07-17 DIAGNOSIS — Z95.5 STENTED CORONARY ARTERY: ICD-10-CM

## 2024-07-17 DIAGNOSIS — I21.4 NSTEMI (NON-ST ELEVATED MYOCARDIAL INFARCTION) (MULTI): ICD-10-CM

## 2024-07-17 PROCEDURE — 93798 PHYS/QHP OP CAR RHAB W/ECG: CPT | Performed by: INTERNAL MEDICINE

## 2024-07-19 ENCOUNTER — CLINICAL SUPPORT (OUTPATIENT)
Dept: CARDIAC REHAB | Facility: HOSPITAL | Age: 74
End: 2024-07-19
Payer: MEDICARE

## 2024-07-19 DIAGNOSIS — I21.4 NSTEMI (NON-ST ELEVATED MYOCARDIAL INFARCTION) (MULTI): ICD-10-CM

## 2024-07-19 DIAGNOSIS — Z95.5 STENTED CORONARY ARTERY: ICD-10-CM

## 2024-07-19 PROCEDURE — 93798 PHYS/QHP OP CAR RHAB W/ECG: CPT

## 2024-07-21 ENCOUNTER — HOSPITAL ENCOUNTER (EMERGENCY)
Facility: HOSPITAL | Age: 74
Discharge: HOME | End: 2024-07-21
Payer: MEDICARE

## 2024-07-21 ENCOUNTER — APPOINTMENT (OUTPATIENT)
Dept: CARDIOLOGY | Facility: HOSPITAL | Age: 74
End: 2024-07-21
Payer: MEDICARE

## 2024-07-21 ENCOUNTER — APPOINTMENT (OUTPATIENT)
Dept: RADIOLOGY | Facility: HOSPITAL | Age: 74
End: 2024-07-21
Payer: MEDICARE

## 2024-07-21 VITALS
HEIGHT: 72 IN | TEMPERATURE: 97 F | DIASTOLIC BLOOD PRESSURE: 72 MMHG | RESPIRATION RATE: 16 BRPM | SYSTOLIC BLOOD PRESSURE: 134 MMHG | WEIGHT: 265 LBS | HEART RATE: 70 BPM | BODY MASS INDEX: 35.89 KG/M2 | OXYGEN SATURATION: 95 %

## 2024-07-21 DIAGNOSIS — Z04.1 EXAM FOLLOWING MVC (MOTOR VEHICLE COLLISION), NO APPARENT INJURY: Primary | ICD-10-CM

## 2024-07-21 DIAGNOSIS — S63.501A SPRAIN OF RIGHT WRIST, INITIAL ENCOUNTER: ICD-10-CM

## 2024-07-21 LAB
ALBUMIN SERPL BCP-MCNC: 4.3 G/DL (ref 3.4–5)
ALP SERPL-CCNC: 75 U/L (ref 33–136)
ALT SERPL W P-5'-P-CCNC: 38 U/L (ref 10–52)
ANION GAP SERPL CALC-SCNC: 15 MMOL/L (ref 10–20)
AST SERPL W P-5'-P-CCNC: 51 U/L (ref 9–39)
BASOPHILS # BLD AUTO: 0.03 X10*3/UL (ref 0–0.1)
BASOPHILS NFR BLD AUTO: 0.5 %
BILIRUB SERPL-MCNC: 0.5 MG/DL (ref 0–1.2)
BUN SERPL-MCNC: 23 MG/DL (ref 6–23)
CALCIUM SERPL-MCNC: 9.6 MG/DL (ref 8.6–10.3)
CHLORIDE SERPL-SCNC: 103 MMOL/L (ref 98–107)
CO2 SERPL-SCNC: 25 MMOL/L (ref 21–32)
CREAT SERPL-MCNC: 1.15 MG/DL (ref 0.5–1.3)
EGFRCR SERPLBLD CKD-EPI 2021: 67 ML/MIN/1.73M*2
EOSINOPHIL # BLD AUTO: 0.17 X10*3/UL (ref 0–0.4)
EOSINOPHIL NFR BLD AUTO: 2.9 %
ERYTHROCYTE [DISTWIDTH] IN BLOOD BY AUTOMATED COUNT: 14.1 % (ref 11.5–14.5)
GLUCOSE SERPL-MCNC: 125 MG/DL (ref 74–99)
HCT VFR BLD AUTO: 45.3 % (ref 41–52)
HGB BLD-MCNC: 15.3 G/DL (ref 13.5–17.5)
IMM GRANULOCYTES # BLD AUTO: 0.02 X10*3/UL (ref 0–0.5)
IMM GRANULOCYTES NFR BLD AUTO: 0.3 % (ref 0–0.9)
INR PPP: 2.6 (ref 0.9–1.1)
LYMPHOCYTES # BLD AUTO: 1.05 X10*3/UL (ref 0.8–3)
LYMPHOCYTES NFR BLD AUTO: 18 %
MCH RBC QN AUTO: 30.8 PG (ref 26–34)
MCHC RBC AUTO-ENTMCNC: 33.8 G/DL (ref 32–36)
MCV RBC AUTO: 91 FL (ref 80–100)
MONOCYTES # BLD AUTO: 0.62 X10*3/UL (ref 0.05–0.8)
MONOCYTES NFR BLD AUTO: 10.6 %
NEUTROPHILS # BLD AUTO: 3.94 X10*3/UL (ref 1.6–5.5)
NEUTROPHILS NFR BLD AUTO: 67.7 %
NRBC BLD-RTO: 0 /100 WBCS (ref 0–0)
PLATELET # BLD AUTO: 241 X10*3/UL (ref 150–450)
POTASSIUM SERPL-SCNC: 3.8 MMOL/L (ref 3.5–5.3)
PROT SERPL-MCNC: 7.5 G/DL (ref 6.4–8.2)
PROTHROMBIN TIME: 29.1 SECONDS (ref 9.8–12.8)
RBC # BLD AUTO: 4.97 X10*6/UL (ref 4.5–5.9)
SODIUM SERPL-SCNC: 139 MMOL/L (ref 136–145)
WBC # BLD AUTO: 5.8 X10*3/UL (ref 4.4–11.3)

## 2024-07-21 PROCEDURE — 73110 X-RAY EXAM OF WRIST: CPT | Mod: RIGHT SIDE | Performed by: RADIOLOGY

## 2024-07-21 PROCEDURE — 73110 X-RAY EXAM OF WRIST: CPT | Mod: RT

## 2024-07-21 PROCEDURE — 72125 CT NECK SPINE W/O DYE: CPT

## 2024-07-21 PROCEDURE — 85610 PROTHROMBIN TIME: CPT | Performed by: HEALTH CARE PROVIDER

## 2024-07-21 PROCEDURE — 80053 COMPREHEN METABOLIC PANEL: CPT | Performed by: HEALTH CARE PROVIDER

## 2024-07-21 PROCEDURE — 36415 COLL VENOUS BLD VENIPUNCTURE: CPT | Performed by: HEALTH CARE PROVIDER

## 2024-07-21 PROCEDURE — 71045 X-RAY EXAM CHEST 1 VIEW: CPT

## 2024-07-21 PROCEDURE — 85025 COMPLETE CBC W/AUTO DIFF WBC: CPT | Performed by: HEALTH CARE PROVIDER

## 2024-07-21 PROCEDURE — 71045 X-RAY EXAM CHEST 1 VIEW: CPT | Mod: FOREIGN READ | Performed by: RADIOLOGY

## 2024-07-21 PROCEDURE — 93005 ELECTROCARDIOGRAM TRACING: CPT

## 2024-07-21 PROCEDURE — 70450 CT HEAD/BRAIN W/O DYE: CPT | Performed by: RADIOLOGY

## 2024-07-21 PROCEDURE — 70450 CT HEAD/BRAIN W/O DYE: CPT

## 2024-07-21 PROCEDURE — 72125 CT NECK SPINE W/O DYE: CPT | Performed by: RADIOLOGY

## 2024-07-21 PROCEDURE — 99285 EMERGENCY DEPT VISIT HI MDM: CPT | Performed by: HEALTH CARE PROVIDER

## 2024-07-21 ASSESSMENT — LIFESTYLE VARIABLES
HAVE YOU EVER FELT YOU SHOULD CUT DOWN ON YOUR DRINKING: NO
HAVE PEOPLE ANNOYED YOU BY CRITICIZING YOUR DRINKING: NO
TOTAL SCORE: 0
EVER FELT BAD OR GUILTY ABOUT YOUR DRINKING: NO
EVER HAD A DRINK FIRST THING IN THE MORNING TO STEADY YOUR NERVES TO GET RID OF A HANGOVER: NO

## 2024-07-21 ASSESSMENT — COLUMBIA-SUICIDE SEVERITY RATING SCALE - C-SSRS
6. HAVE YOU EVER DONE ANYTHING, STARTED TO DO ANYTHING, OR PREPARED TO DO ANYTHING TO END YOUR LIFE?: NO
2. HAVE YOU ACTUALLY HAD ANY THOUGHTS OF KILLING YOURSELF?: NO
1. IN THE PAST MONTH, HAVE YOU WISHED YOU WERE DEAD OR WISHED YOU COULD GO TO SLEEP AND NOT WAKE UP?: NO

## 2024-07-21 ASSESSMENT — PAIN DESCRIPTION - LOCATION: LOCATION: WRIST

## 2024-07-21 ASSESSMENT — PAIN - FUNCTIONAL ASSESSMENT: PAIN_FUNCTIONAL_ASSESSMENT: 0-10

## 2024-07-21 ASSESSMENT — PAIN DESCRIPTION - PAIN TYPE: TYPE: ACUTE PAIN

## 2024-07-21 ASSESSMENT — PAIN DESCRIPTION - ORIENTATION: ORIENTATION: RIGHT
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
## 2024-07-22 ENCOUNTER — APPOINTMENT (OUTPATIENT)
Dept: CARDIAC REHAB | Facility: HOSPITAL | Age: 74
End: 2024-07-22
Payer: MEDICARE

## 2024-07-22 LAB
ATRIAL RATE: 68 BPM
P AXIS: -17 DEGREES
P OFFSET: 155 MS
P ONSET: 118 MS
PR INTERVAL: 172 MS
Q ONSET: 204 MS
QRS COUNT: 11 BEATS
QRS DURATION: 118 MS
QT INTERVAL: 422 MS
QTC CALCULATION(BAZETT): 448 MS
QTC FREDERICIA: 440 MS
R AXIS: -46 DEGREES
T AXIS: 59 DEGREES
T OFFSET: 415 MS
VENTRICULAR RATE: 68 BPM

## 2024-07-24 ENCOUNTER — APPOINTMENT (OUTPATIENT)
Dept: CARDIAC REHAB | Facility: HOSPITAL | Age: 74
End: 2024-07-24
Payer: MEDICARE

## 2024-07-24 ENCOUNTER — PATIENT OUTREACH (OUTPATIENT)
Dept: PRIMARY CARE | Facility: CLINIC | Age: 74
End: 2024-07-24
Payer: COMMERCIAL
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
## 2024-07-26 ENCOUNTER — APPOINTMENT (OUTPATIENT)
Dept: CARDIAC REHAB | Facility: HOSPITAL | Age: 74
End: 2024-07-26
Payer: MEDICARE

## 2024-07-29 ENCOUNTER — APPOINTMENT (OUTPATIENT)
Dept: CARDIAC REHAB | Facility: HOSPITAL | Age: 74
End: 2024-07-29
Payer: MEDICARE

## 2024-07-29 ENCOUNTER — APPOINTMENT (OUTPATIENT)
Dept: PRIMARY CARE | Facility: CLINIC | Age: 74
End: 2024-07-29
Payer: MEDICARE

## 2024-07-29 VITALS
BODY MASS INDEX: 35.89 KG/M2 | SYSTOLIC BLOOD PRESSURE: 122 MMHG | HEIGHT: 72 IN | DIASTOLIC BLOOD PRESSURE: 64 MMHG | OXYGEN SATURATION: 98 % | WEIGHT: 265 LBS | HEART RATE: 62 BPM

## 2024-07-29 DIAGNOSIS — M54.32 LEFT SIDED SCIATICA: ICD-10-CM

## 2024-07-29 DIAGNOSIS — I21.4 NSTEMI (NON-ST ELEVATED MYOCARDIAL INFARCTION) (MULTI): ICD-10-CM

## 2024-07-29 DIAGNOSIS — S63.501D SPRAIN OF RIGHT WRIST, SUBSEQUENT ENCOUNTER: Primary | ICD-10-CM

## 2024-07-29 DIAGNOSIS — R51.9 NONINTRACTABLE HEADACHE, UNSPECIFIED CHRONICITY PATTERN, UNSPECIFIED HEADACHE TYPE: ICD-10-CM

## 2024-07-29 PROBLEM — S52.501D CLOSED FRACTURE OF LOWER END OF RIGHT RADIUS WITH ROUTINE HEALING: Status: RESOLVED | Noted: 2023-03-03 | Resolved: 2024-07-29

## 2024-07-29 PROCEDURE — 1036F TOBACCO NON-USER: CPT | Performed by: FAMILY MEDICINE

## 2024-07-29 PROCEDURE — 3008F BODY MASS INDEX DOCD: CPT | Performed by: FAMILY MEDICINE

## 2024-07-29 PROCEDURE — 99214 OFFICE O/P EST MOD 30 MIN: CPT | Performed by: FAMILY MEDICINE

## 2024-07-29 PROCEDURE — 4010F ACE/ARB THERAPY RXD/TAKEN: CPT | Performed by: FAMILY MEDICINE

## 2024-07-29 PROCEDURE — 3048F LDL-C <100 MG/DL: CPT | Performed by: FAMILY MEDICINE

## 2024-07-29 PROCEDURE — 1159F MED LIST DOCD IN RCRD: CPT | Performed by: FAMILY MEDICINE

## 2024-07-29 PROCEDURE — 3074F SYST BP LT 130 MM HG: CPT | Performed by: FAMILY MEDICINE

## 2024-07-29 PROCEDURE — 1160F RVW MEDS BY RX/DR IN RCRD: CPT | Performed by: FAMILY MEDICINE

## 2024-07-29 PROCEDURE — 3078F DIAST BP <80 MM HG: CPT | Performed by: FAMILY MEDICINE

## 2024-07-29 RX ORDER — NITROGLYCERIN 0.4 MG/1
0.4 TABLET SUBLINGUAL EVERY 5 MIN PRN
Qty: 20 TABLET | Refills: 1 | Status: SHIPPED | OUTPATIENT
Start: 2024-07-29

## 2024-07-29 RX ORDER — PREDNISONE 20 MG/1
20 TABLET ORAL DAILY
Qty: 5 TABLET | Refills: 0 | Status: SHIPPED | OUTPATIENT
Start: 2024-07-29 | End: 2024-08-03
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
## 2024-07-31 ENCOUNTER — APPOINTMENT (OUTPATIENT)
Dept: CARDIAC REHAB | Facility: HOSPITAL | Age: 74
End: 2024-07-31
Payer: MEDICARE

## 2024-08-02 ENCOUNTER — APPOINTMENT (OUTPATIENT)
Dept: CARDIAC REHAB | Facility: HOSPITAL | Age: 74
End: 2024-08-02
Payer: MEDICARE

## 2024-08-05 ENCOUNTER — DOCUMENTATION (OUTPATIENT)
Dept: CARDIAC REHAB | Facility: HOSPITAL | Age: 74
End: 2024-08-05
Payer: COMMERCIAL

## 2024-08-05 ENCOUNTER — APPOINTMENT (OUTPATIENT)
Dept: CARDIAC REHAB | Facility: HOSPITAL | Age: 74
End: 2024-08-05
Payer: MEDICARE
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
## 2024-08-06 ENCOUNTER — APPOINTMENT (OUTPATIENT)
Dept: PRIMARY CARE | Facility: CLINIC | Age: 74
End: 2024-08-06
Payer: MEDICARE

## 2024-08-06 DIAGNOSIS — Z95.2 HISTORY OF HEART VALVE REPLACEMENT WITH MECHANICAL VALVE: ICD-10-CM

## 2024-08-06 LAB
POC INR: 2.6
POC PROTHROMBIN TIME: NORMAL

## 2024-08-06 PROCEDURE — 85610 PROTHROMBIN TIME: CPT | Performed by: FAMILY MEDICINE

## 2024-08-07 ENCOUNTER — APPOINTMENT (OUTPATIENT)
Dept: CARDIAC REHAB | Facility: HOSPITAL | Age: 74
End: 2024-08-07
Payer: MEDICARE

## 2024-08-08 ENCOUNTER — APPOINTMENT (OUTPATIENT)
Dept: PRIMARY CARE | Facility: CLINIC | Age: 74
End: 2024-08-08
Payer: MEDICARE

## 2024-08-09 ENCOUNTER — APPOINTMENT (OUTPATIENT)
Dept: CARDIAC REHAB | Facility: HOSPITAL | Age: 74
End: 2024-08-09
Payer: MEDICARE

## 2024-08-09 ENCOUNTER — OFFICE VISIT (OUTPATIENT)
Dept: PRIMARY CARE | Facility: CLINIC | Age: 74
End: 2024-08-09
Payer: MEDICARE

## 2024-08-09 ENCOUNTER — HOSPITAL ENCOUNTER (OUTPATIENT)
Dept: RADIOLOGY | Facility: CLINIC | Age: 74
Discharge: HOME | End: 2024-08-09
Payer: MEDICARE

## 2024-08-09 VITALS
HEART RATE: 56 BPM | BODY MASS INDEX: 35.89 KG/M2 | DIASTOLIC BLOOD PRESSURE: 70 MMHG | SYSTOLIC BLOOD PRESSURE: 134 MMHG | WEIGHT: 265 LBS | OXYGEN SATURATION: 96 % | HEIGHT: 72 IN

## 2024-08-09 DIAGNOSIS — S63.501D SPRAIN OF RIGHT WRIST, SUBSEQUENT ENCOUNTER: ICD-10-CM

## 2024-08-09 DIAGNOSIS — S63.501D SPRAIN OF RIGHT WRIST, SUBSEQUENT ENCOUNTER: Primary | ICD-10-CM

## 2024-08-09 PROCEDURE — 3048F LDL-C <100 MG/DL: CPT | Performed by: FAMILY MEDICINE

## 2024-08-09 PROCEDURE — 1160F RVW MEDS BY RX/DR IN RCRD: CPT | Performed by: FAMILY MEDICINE

## 2024-08-09 PROCEDURE — 1123F ACP DISCUSS/DSCN MKR DOCD: CPT | Performed by: FAMILY MEDICINE

## 2024-08-09 PROCEDURE — 1036F TOBACCO NON-USER: CPT | Performed by: FAMILY MEDICINE

## 2024-08-09 PROCEDURE — 3008F BODY MASS INDEX DOCD: CPT | Performed by: FAMILY MEDICINE

## 2024-08-09 PROCEDURE — 4010F ACE/ARB THERAPY RXD/TAKEN: CPT | Performed by: FAMILY MEDICINE

## 2024-08-09 PROCEDURE — 3078F DIAST BP <80 MM HG: CPT | Performed by: FAMILY MEDICINE

## 2024-08-09 PROCEDURE — 73110 X-RAY EXAM OF WRIST: CPT | Mod: RT

## 2024-08-09 PROCEDURE — 99213 OFFICE O/P EST LOW 20 MIN: CPT | Performed by: FAMILY MEDICINE

## 2024-08-09 PROCEDURE — 1159F MED LIST DOCD IN RCRD: CPT | Performed by: FAMILY MEDICINE

## 2024-08-09 PROCEDURE — 3075F SYST BP GE 130 - 139MM HG: CPT | Performed by: FAMILY MEDICINE
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
## 2024-08-12 ENCOUNTER — APPOINTMENT (OUTPATIENT)
Dept: CARDIAC REHAB | Facility: HOSPITAL | Age: 74
End: 2024-08-12
Payer: MEDICARE

## 2024-08-17 DIAGNOSIS — M06.4 INFLAMMATORY POLYARTHROPATHY (MULTI): ICD-10-CM

## 2024-08-17 DIAGNOSIS — I25.10 CORONARY ARTERY DISEASE INVOLVING NATIVE CORONARY ARTERY OF NATIVE HEART WITHOUT ANGINA PECTORIS: ICD-10-CM

## 2024-08-17 DIAGNOSIS — G60.9 HEREDITARY AND IDIOPATHIC NEUROPATHY: ICD-10-CM

## 2024-08-17 RX ORDER — FOLIC ACID 1 MG/1
1 TABLET ORAL EVERY MORNING
Qty: 90 TABLET | Refills: 3 | Status: SHIPPED | OUTPATIENT
Start: 2024-08-17 | End: 2025-08-17

## 2024-08-19 ENCOUNTER — TELEPHONE (OUTPATIENT)
Dept: PAIN MEDICINE | Facility: CLINIC | Age: 74
End: 2024-08-19

## 2024-08-19 ENCOUNTER — APPOINTMENT (OUTPATIENT)
Dept: RHEUMATOLOGY | Facility: CLINIC | Age: 74
End: 2024-08-19
Payer: COMMERCIAL

## 2024-08-19 VITALS
HEART RATE: 57 BPM | WEIGHT: 265 LBS | SYSTOLIC BLOOD PRESSURE: 132 MMHG | OXYGEN SATURATION: 97 % | DIASTOLIC BLOOD PRESSURE: 63 MMHG | BODY MASS INDEX: 35.89 KG/M2 | HEIGHT: 72 IN

## 2024-08-19 DIAGNOSIS — M54.50 CHRONIC LOW BACK PAIN WITHOUT SCIATICA, UNSPECIFIED BACK PAIN LATERALITY: Primary | ICD-10-CM

## 2024-08-19 DIAGNOSIS — G89.29 CHRONIC LOW BACK PAIN WITHOUT SCIATICA, UNSPECIFIED BACK PAIN LATERALITY: Primary | ICD-10-CM

## 2024-08-19 DIAGNOSIS — M06.00 SERONEGATIVE RHEUMATOID ARTHRITIS (MULTI): ICD-10-CM

## 2024-08-19 DIAGNOSIS — Z79.899 ENCOUNTER FOR LONG-TERM (CURRENT) USE OF MEDICATIONS: ICD-10-CM

## 2024-08-19 PROCEDURE — 1123F ACP DISCUSS/DSCN MKR DOCD: CPT | Performed by: INTERNAL MEDICINE

## 2024-08-19 PROCEDURE — 4010F ACE/ARB THERAPY RXD/TAKEN: CPT | Performed by: INTERNAL MEDICINE

## 2024-08-19 PROCEDURE — 99214 OFFICE O/P EST MOD 30 MIN: CPT | Performed by: INTERNAL MEDICINE

## 2024-08-19 PROCEDURE — 1036F TOBACCO NON-USER: CPT | Performed by: INTERNAL MEDICINE

## 2024-08-19 PROCEDURE — 3078F DIAST BP <80 MM HG: CPT | Performed by: INTERNAL MEDICINE

## 2024-08-19 PROCEDURE — 3075F SYST BP GE 130 - 139MM HG: CPT | Performed by: INTERNAL MEDICINE

## 2024-08-19 PROCEDURE — 3048F LDL-C <100 MG/DL: CPT | Performed by: INTERNAL MEDICINE

## 2024-08-19 PROCEDURE — 3008F BODY MASS INDEX DOCD: CPT | Performed by: INTERNAL MEDICINE

## 2024-08-19 ASSESSMENT — ENCOUNTER SYMPTOMS
SLEEP DISTURBANCE: 1
FATIGUE: 1
APNEA: 1
ABDOMINAL PAIN: 0
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
## 2024-08-19 NOTE — TELEPHONE ENCOUNTER
Patient never filled his 08/05 script for oxy because none of the pharmacies in [place] had it. Walks in today because he is out and [facility] and [facility] as of today are not expecting any in. Wants to know if we can change his med because he doesn't travel to [place] to be able to get them there.

## 2024-08-20 NOTE — TELEPHONE ENCOUNTER
Spoke with patient about not being able to just switch his medication to something that is at a pharmacy in [place]. Told him we would call in his medication to a pharmacy that has his dose. He wasn't sure if he wanted to travel out of [place] to go  his prescription. I mentioned [facility] usually having the dose he is on and if [facility] had a [facility] in [facility] that was close to him I would call it in. He declined at this time and will call back if he finds another pharmacy.

## 2024-09-03 ENCOUNTER — DOCUMENTATION (OUTPATIENT)
Dept: CARDIAC REHAB | Facility: HOSPITAL | Age: 74
End: 2024-09-03

## 2024-09-03 ENCOUNTER — APPOINTMENT (OUTPATIENT)
Dept: PRIMARY CARE | Facility: CLINIC | Age: 74
End: 2024-09-03
Payer: MEDICARE

## 2024-09-03 DIAGNOSIS — Z95.2 HISTORY OF HEART VALVE REPLACEMENT WITH MECHANICAL VALVE: Primary | ICD-10-CM

## 2024-09-03 LAB
POC INR: 3.4 (ref 2.5–3.5)
POC PROTHROMBIN TIME: NORMAL

## 2024-09-03 PROCEDURE — 85610 PROTHROMBIN TIME: CPT | Performed by: FAMILY MEDICINE
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
## 2024-09-23 DIAGNOSIS — E11.9 CONTROLLED TYPE 2 DIABETES MELLITUS WITHOUT COMPLICATION, WITHOUT LONG-TERM CURRENT USE OF INSULIN (MULTI): ICD-10-CM

## 2024-09-23 DIAGNOSIS — E03.9 ACQUIRED HYPOTHYROIDISM: ICD-10-CM

## 2024-09-23 DIAGNOSIS — I10 BENIGN ESSENTIAL HYPERTENSION: ICD-10-CM

## 2024-09-23 RX ORDER — METFORMIN HYDROCHLORIDE 850 MG/1
850 TABLET ORAL
Qty: 180 TABLET | Refills: 3 | Status: SHIPPED | OUTPATIENT
Start: 2024-09-23

## 2024-09-23 RX ORDER — LOSARTAN POTASSIUM 100 MG/1
100 TABLET ORAL DAILY
Qty: 90 TABLET | Refills: 3 | Status: SHIPPED | OUTPATIENT
Start: 2024-09-23

## 2024-09-23 RX ORDER — LEVOTHYROXINE SODIUM 137 UG/1
137 TABLET ORAL DAILY
Qty: 90 TABLET | Refills: 3 | Status: SHIPPED | OUTPATIENT
Start: 2024-09-23

## 2024-09-24 ENCOUNTER — EVALUATION (OUTPATIENT)
Dept: PHYSICAL THERAPY | Facility: CLINIC | Age: 74
End: 2024-09-24
Payer: MEDICARE

## 2024-09-24 DIAGNOSIS — M54.50 CHRONIC LOW BACK PAIN WITHOUT SCIATICA, UNSPECIFIED BACK PAIN LATERALITY: Primary | ICD-10-CM

## 2024-09-24 DIAGNOSIS — M25.60 JOINT STIFFNESS OF SPINE: ICD-10-CM

## 2024-09-24 DIAGNOSIS — M62.89 MUSCLE TIGHTNESS: ICD-10-CM

## 2024-09-24 DIAGNOSIS — G89.29 CHRONIC LOW BACK PAIN WITHOUT SCIATICA, UNSPECIFIED BACK PAIN LATERALITY: Primary | ICD-10-CM

## 2024-09-24 DIAGNOSIS — M62.81 WEAKNESS OF TRUNK MUSCULATURE: ICD-10-CM

## 2024-09-24 PROCEDURE — 97161 PT EVAL LOW COMPLEX 20 MIN: CPT | Mod: GP

## 2024-09-24 ASSESSMENT — ENCOUNTER SYMPTOMS
OCCASIONAL FEELINGS OF UNSTEADINESS: 1
LOSS OF SENSATION IN FEET: 1
DEPRESSION: 0
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
## 2024-09-26 ENCOUNTER — OFFICE VISIT (OUTPATIENT)
Dept: PAIN MEDICINE | Facility: CLINIC | Age: 74
End: 2024-09-26
Payer: MEDICARE

## 2024-09-26 VITALS
HEART RATE: 53 BPM | DIASTOLIC BLOOD PRESSURE: 80 MMHG | RESPIRATION RATE: 18 BRPM | SYSTOLIC BLOOD PRESSURE: 142 MMHG | OXYGEN SATURATION: 97 %

## 2024-09-26 DIAGNOSIS — M47.26 OSTEOARTHRITIS OF SPINE WITH RADICULOPATHY, LUMBAR REGION: ICD-10-CM

## 2024-09-26 DIAGNOSIS — M51.16 LUMBAR DISC HERNIATION WITH RADICULOPATHY: ICD-10-CM

## 2024-09-26 DIAGNOSIS — M54.16 LUMBAR RADICULOPATHY: Primary | ICD-10-CM

## 2024-09-26 DIAGNOSIS — Z79.891 ENCOUNTER FOR LONG-TERM USE OF OPIATE ANALGESIC: ICD-10-CM

## 2024-09-26 DIAGNOSIS — M48.062 NEUROGENIC CLAUDICATION DUE TO LUMBAR SPINAL STENOSIS: ICD-10-CM

## 2024-09-26 PROCEDURE — 4010F ACE/ARB THERAPY RXD/TAKEN: CPT | Performed by: NURSE PRACTITIONER

## 2024-09-26 PROCEDURE — 3048F LDL-C <100 MG/DL: CPT | Performed by: NURSE PRACTITIONER

## 2024-09-26 PROCEDURE — 99213 OFFICE O/P EST LOW 20 MIN: CPT | Performed by: NURSE PRACTITIONER

## 2024-09-26 PROCEDURE — 3077F SYST BP >= 140 MM HG: CPT | Performed by: NURSE PRACTITIONER

## 2024-09-26 PROCEDURE — 3079F DIAST BP 80-89 MM HG: CPT | Performed by: NURSE PRACTITIONER

## 2024-09-26 PROCEDURE — 1160F RVW MEDS BY RX/DR IN RCRD: CPT | Performed by: NURSE PRACTITIONER

## 2024-09-26 PROCEDURE — 1123F ACP DISCUSS/DSCN MKR DOCD: CPT | Performed by: NURSE PRACTITIONER

## 2024-09-26 PROCEDURE — 1159F MED LIST DOCD IN RCRD: CPT | Performed by: NURSE PRACTITIONER

## 2024-09-26 PROCEDURE — 1036F TOBACCO NON-USER: CPT | Performed by: NURSE PRACTITIONER

## 2024-09-26 RX ORDER — OXYCODONE AND ACETAMINOPHEN 5; 325 MG/1; MG/1
1 TABLET ORAL 3 TIMES DAILY PRN
Qty: 84 TABLET | Refills: 0 | Status: SHIPPED | OUTPATIENT
Start: 2024-09-26 | End: 2024-10-24

## 2024-09-26 RX ORDER — ASPIRIN 81 MG/1
81 TABLET ORAL DAILY
COMMUNITY

## 2024-09-26 RX ORDER — HYDROCODONE BITARTRATE AND ACETAMINOPHEN 5; 325 MG/1; MG/1
1 TABLET ORAL 3 TIMES DAILY PRN
Qty: 84 TABLET | Refills: 0 | Status: CANCELLED | OUTPATIENT
Start: 2024-11-21 | End: 2024-12-19

## 2024-09-26 RX ORDER — NALOXONE HYDROCHLORIDE 4 MG/.1ML
1 SPRAY NASAL AS NEEDED
Qty: 2 EACH | Refills: 0 | Status: SHIPPED | OUTPATIENT
Start: 2024-09-26

## 2024-09-26 RX ORDER — OXYCODONE AND ACETAMINOPHEN 5; 325 MG/1; MG/1
1 TABLET ORAL 3 TIMES DAILY PRN
Qty: 84 TABLET | Refills: 0 | Status: SHIPPED | OUTPATIENT
Start: 2024-10-24 | End: 2024-11-21

## 2024-09-26 RX ORDER — HYDROCODONE BITARTRATE AND ACETAMINOPHEN 5; 325 MG/1; MG/1
1 TABLET ORAL 3 TIMES DAILY PRN
Qty: 84 TABLET | Refills: 0 | Status: CANCELLED | OUTPATIENT
Start: 2024-09-26 | End: 2024-10-24

## 2024-09-26 RX ORDER — HYDROCODONE BITARTRATE AND ACETAMINOPHEN 5; 325 MG/1; MG/1
1 TABLET ORAL 3 TIMES DAILY PRN
Qty: 84 TABLET | Refills: 0 | Status: CANCELLED | OUTPATIENT
Start: 2024-10-24 | End: 2024-11-21

## 2024-09-26 RX ORDER — OXYCODONE AND ACETAMINOPHEN 5; 325 MG/1; MG/1
1 TABLET ORAL 3 TIMES DAILY PRN
Qty: 84 TABLET | Refills: 0 | Status: SHIPPED | OUTPATIENT
Start: 2024-11-21 | End: 2024-12-19

## 2024-09-26 ASSESSMENT — ENCOUNTER SYMPTOMS
ARTHRALGIAS: 1
BACK PAIN: 1
ALLERGIC/IMMUNOLOGIC NEGATIVE: 1
LIGHT-HEADEDNESS: 0
PSYCHIATRIC NEGATIVE: 1
EYES NEGATIVE: 1
CONSTITUTIONAL NEGATIVE: 1
DIZZINESS: 0
WEAKNESS: 1
NECK STIFFNESS: 0
NUMBNESS: 1
SPEECH DIFFICULTY: 0
SEIZURES: 0
MYALGIAS: 1
HEADACHES: 0
HEMATOLOGIC/LYMPHATIC NEGATIVE: 1
GASTROINTESTINAL NEGATIVE: 1
RESPIRATORY NEGATIVE: 1
TREMORS: 0
FACIAL ASYMMETRY: 0
NECK PAIN: 0
CARDIOVASCULAR NEGATIVE: 1
ENDOCRINE NEGATIVE: 1
JOINT SWELLING: 0
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
## 2024-09-30 ENCOUNTER — TREATMENT (OUTPATIENT)
Dept: PHYSICAL THERAPY | Facility: CLINIC | Age: 74
End: 2024-09-30
Payer: MEDICARE

## 2024-09-30 DIAGNOSIS — M62.81 WEAKNESS OF TRUNK MUSCULATURE: ICD-10-CM

## 2024-09-30 DIAGNOSIS — M25.60 JOINT STIFFNESS OF SPINE: ICD-10-CM

## 2024-09-30 DIAGNOSIS — M62.89 MUSCLE TIGHTNESS: ICD-10-CM

## 2024-09-30 DIAGNOSIS — M54.50 CHRONIC LOW BACK PAIN WITHOUT SCIATICA, UNSPECIFIED BACK PAIN LATERALITY: Primary | ICD-10-CM

## 2024-09-30 DIAGNOSIS — G89.29 CHRONIC LOW BACK PAIN WITHOUT SCIATICA, UNSPECIFIED BACK PAIN LATERALITY: Primary | ICD-10-CM

## 2024-09-30 PROCEDURE — 97110 THERAPEUTIC EXERCISES: CPT | Mod: GP

## 2024-09-30 NOTE — PROGRESS NOTES
Physical Therapy    Physical Therapy Treatment    Patient Name: Shabbir Laurent  MRN: [id number]  Today's Date: 9/30/2024    Time Entry:   Time Calculation  Start Time: 0931  Stop Time: 1011  Time Calculation (min): 40 min     PT Therapeutic Procedures Time Entry  Therapeutic Exercise Time Entry: 40       Assessment:  Patient tolerated treatment well without increased complaints of pain during or after session. Likely lumbar flexion biased, need further assessment to confirm. Difficulty with mobility exercises due to tightness and body habitus. At the end of session patient reported knees felt better, but no change in LBP.   Patient will benefit from skilled PT intervention to allow the patient to progress to the goal of decreased pain during  all functional mobility.  PT will monitor progress towards goals and adjust intervention as appropriate.       Plan:  Assess response to today's session and adjust as needed.    Continue per POC, progressing as tolerated.     Current Problem  1. Chronic low back pain without sciatica, unspecified back pain laterality        2. Joint stiffness of spine        3. Weakness of trunk musculature        4. Muscle tightness            General  General  Reason for Referral: Chronic LBP M54.50  Referred By: Dr. Dominique Vance  Past Medical History Relevant to Rehab: MI 4/2024, [place] surgery 12/2003 ( aortic valve replacement and aortic aneurysm repair) heart stents 2012 and 2024  Visit 2   General Comment: MEDICARE A/B, MMO SUPP, [place], NO AUTH ( $0 USED PT/ST )          Subjective    Patient reports rough morning.  Currently reports pain rated 7/10 in LB, back of both thighs and B knees.  No new complaints.    Precautions  Precautions  STEADI Fall Risk Score (The score of 4 or more indicates an increased risk of falling): 9    Precautions Comment: cardiac precautions  Monitor vitals as needed due to cardiac issues (MI 4/2024, [place] surgery 12/2003 ( aortic valve replacement and aortic aneurysm  "repair) heart stents 2012 and 2024)  [place] - wears hearing aids    Pain  7/10 at the start of session.  Decreased B knee pain, no change in LBP - per patient report at the end of session.    Objective  Amb modified indep with straight cane to/from PT.       Treatments:  Therapeutic exercises to decrease pain, increase ROM/flexibility, and strength to improve tolerance for functional activity.   Recumbent scifit , seat 15 and elevated, level 1.0 x 5 mins (vitals 96% SpO2%, 62 bpm, no complaints of SOB/CP)    Hooklying  TA bracing with deep breathing 3\" hold x 10 reps  R/L SKTC with strap 5\" hold x 10 reps  R/L hamstring stretch with strap 3 x 20\" hold   B isometric hip ADD ball squeeze 5\" hold x 15 reps  R/L  SAQ 3\" hold 2 x 15 reps   DKTC with use of SB under legs 5\" hold x 10 reps      OP EDUCATION:  Correct performance of therapeutic exercises for optimal muscle recruitment.        Goals:  Active       PT Problem       Pain       Start:  09/24/24    Expected End:  11/26/24       Patient will report reduction of pain by  80%  to ease performance of all ADLs, leisure activities and work/household tasks in order to return to PLOF.           ROM       Start:  09/24/24    Expected End:  11/26/24       Patient will demonstrate increased lumbar ROM  to WFL and without pain to ease the performance of prolonged standing and walking and all functional mobility.            Flexibility       Start:  09/24/24    Expected End:  11/26/24       Patient will demonstrate increased BLE flexibility to Good  in order to decrease pain, improve positioning and/or promote improved functional mobility.          HEP       Start:  09/24/24    Expected End:  11/26/24       Patient will be independent and compliant with safe and recommended  HEP to maximize and maintain functional gains made in physical therapy.  -to enhance functional progress and long term management of condition.           Outcome       Start:  09/24/24    Expected End:  " 11/26/24       Patient will improve outcome measures score on  LINDSAY by 15% to show a clinically significant improvement  in functional mobility.          Patient Stated Goal       Start:  09/24/24    Expected End:  11/26/24       Patient will negotiate one flight of steps with handrail and/or AD,  with 75% improved ease of performance.          Patient Stated Goal        Start:  09/24/24    Expected End:  11/26/24        Patient will report increased ease with rising from low couch by 50%.

## 2024-10-01 ENCOUNTER — APPOINTMENT (OUTPATIENT)
Dept: PRIMARY CARE | Facility: CLINIC | Age: 74
End: 2024-10-01
Payer: COMMERCIAL

## 2024-10-01 DIAGNOSIS — Z95.2 HISTORY OF HEART VALVE REPLACEMENT WITH MECHANICAL VALVE: ICD-10-CM

## 2024-10-01 LAB
POC INR: 3.4 (ref 2.5–3.5)
POC PROTHROMBIN TIME: NORMAL

## 2024-10-01 PROCEDURE — 85610 PROTHROMBIN TIME: CPT | Performed by: FAMILY MEDICINE

## 2024-10-05 DIAGNOSIS — M06.4 INFLAMMATORY POLYARTHROPATHY (MULTI): ICD-10-CM

## 2024-10-05 DIAGNOSIS — M13.0 POLYARTHRITIS: ICD-10-CM

## 2024-10-07 ENCOUNTER — TREATMENT (OUTPATIENT)
Dept: PHYSICAL THERAPY | Facility: CLINIC | Age: 74
End: 2024-10-07
Payer: MEDICARE

## 2024-10-07 DIAGNOSIS — M62.89 MUSCLE TIGHTNESS: ICD-10-CM

## 2024-10-07 DIAGNOSIS — M54.50 CHRONIC LOW BACK PAIN WITHOUT SCIATICA, UNSPECIFIED BACK PAIN LATERALITY: Primary | ICD-10-CM

## 2024-10-07 DIAGNOSIS — M62.81 WEAKNESS OF TRUNK MUSCULATURE: ICD-10-CM

## 2024-10-07 DIAGNOSIS — G89.29 CHRONIC LOW BACK PAIN WITHOUT SCIATICA, UNSPECIFIED BACK PAIN LATERALITY: Primary | ICD-10-CM

## 2024-10-07 DIAGNOSIS — M25.60 JOINT STIFFNESS OF SPINE: ICD-10-CM

## 2024-10-07 PROCEDURE — 97110 THERAPEUTIC EXERCISES: CPT | Mod: GP,CQ

## 2024-10-07 RX ORDER — LEFLUNOMIDE 20 MG/1
20 TABLET ORAL DAILY
Qty: 90 TABLET | Refills: 0 | Status: SHIPPED | OUTPATIENT
Start: 2024-10-07

## 2024-10-07 NOTE — PROGRESS NOTES
"Physical Therapy                                                                                  Physical Therapy Treatment       Patient name: Shabbir Laurent  MRN:   [id number]  Today's Date: 10/7/24     Time Calculation  Start Time: 1132  Stop Time: 1215  Time Calculation (min): 43 min  1. Chronic low back pain without sciatica, unspecified back pain laterality  Follow Up In Physical Therapy      2. Joint stiffness of spine        3. Weakness of trunk musculature        4. Muscle tightness               General  Reason for Referral: Chronic LBP M54.50  Referred By: Dr. Dominique Vance  Past Medical History Relevant to Rehab: [place] 4/2024, [place] 12/[place] ( aortic valve replacement and aortic aneurysm repair) heart stents 2012 and 2024,   [place] A/B, [place], [place] AUTH ( $0 USED PT/ST )  Visit#3  Precautions  cardiac precautions     Subjective: My best 1.5-2.0 mph on TM <10 minutes at the time I was going to cardiac rehab. \"Can't do that now\"  Response to last session:  sore for a few days following previous session       Pain   7/10  Continuous radiating pain bilaterally 7/10    Treatment:    Therapeutic exercises x 43 minutes to decrease pain, increase ROM/flexibility, and strength to improve tolerance for functional activity.     Recumbent scifit , seat 15 and elevated, level 1.0 x 10 mins (vitals 97% SpO2%, 73 bpm, no complaints of SOB/CP)     Hooklying  TA bracing with deep breathing 5\" hold x 15 reps  R/L SKTC with strap 5\" hold x 10 reps  R/L hamstring stretch with strap 3 x 20\" hold   B isometric hip ADD ball squeeze 5\" hold x 15 reps  R/L  SAQ 3\" hold  x 15 reps   DKTC with use of SB under legs 5\" hold x 15 rep  Assessment:  Benefits from close supervision to re instruct with exercise performed especially  to breath with methods to do so . Pt requires extended time to complete exercises performed. Completed session with moderate effort , without increase in pain or symptoms  Plan:    OP PT " Hospitalist Progress Note    NAME: Sandra Olson   :  1938   MRN:  [id number]     Interim Hospital Summary: 66 y.o. female whom presented on 2017 with      Assessment / Plan:  1. Recurrent Hypercalcemia(POA): now improved with hydration. On IVF. D/w Dr Dasia Meek. Previous testing done incl SPEP which was negative, possibly related to vitamin D.  PTH is low so not primary parathyroid -improved with IVF. Stop Vitamin D and tums  2. Acute on CKD(POA): could be dehydration,poor po intake, hypercalcemia. Now cr improved. Nephrology consulted. 3.  Glaucoma: continue eye drops   4. Hypokalemia: not POA. repleted  5. Hypertension: not on antihypertensive meds. Taken off norvasc recently. Add PRN hydralazine.   6. Diarrhea: no recent abx.  stool for CX negative. 7. PT/OT. Dispo: possible dc today after phosphorus repletion. Code Status: full  Surrogate Decision Maker:      DVT Prophylaxis: lovenox  GI Prophylaxis: not indicated     Baseline: independent            Subjective:     Chief Complaint / Reason for Physician Visit  No c/o abd pain/N/V. Had some vivid dreams last night. Discussed with RN events overnight. Review of Systems:  Symptom Y/N Comments  Symptom Y/N Comments   Fever/Chills n   Chest Pain n    Poor Appetite y   Edema n    Cough n   Abdominal Pain n    Sputum n   Joint Pain n    SOB/SAMPSON n   Pruritis/Rash     Nausea/vomit n   Tolerating PT/OT     Diarrhea y   Tolerating Diet     Constipation n   Other         Objective:     VITALS:   Last 24hrs VS reviewed since prior progress note.  Most recent are:  Patient Vitals for the past 24 hrs:   Temp Pulse Resp BP SpO2   17 0831 97.8 °F (36.6 °C) 91 18 115/56 97 %   17 0534 97.9 °F (36.6 °C) 83 18 124/65 96 %   17 2208 98.2 °F (36.8 °C) 97 18 (!) 165/92 100 %   17 98.2 °F (36.8 °C) 89 18 140/80 99 %   17 1403 98.2 °F (36.8 °C) 91 18 148/85 99 % Intake/Output Summary (Last 24 hours) at 01/19/17 1143  Last data filed at 01/18/17 2146   Gross per 24 hour   Intake                0 ml   Output              250 ml   Net             -250 ml        PHYSICAL EXAM:  General: WD, WN. Alert, cooperative, no acute distress    EENT:  EOMI. Anicteric sclerae. MMM  Resp:  CTA bilaterally, no wheezing or rales. No accessory muscle use  CV:  Regular  rhythm,  No edema  GI:  Soft, Non distended, Non tender.  +Bowel sounds  Neurologic:  Alert and oriented X 3, normal speech,   Psych:   Good insight. Not anxious nor agitated  Skin:  no rashes or ulcers. No jaundice    Reviewed most current lab test results and cultures  YES  Reviewed most current radiology test results   YES  Review and summation of old records today    NO  Reviewed patient's current orders and MAR    YES  PMH/ reviewed - no change compared to H&P  ________________________________________________________________________  Care Plan discussed with:    Comments   Patient x    Family      RN x    Care Manager                    Consultant:  baltazar Negron   ________________________________________________________________________  Total NON critical care TIME: 25  Minutes    Total CRITICAL CARE TIME Spent:   Minutes non procedure based      Comments   >50% of visit spent in counseling and coordination of care     ________________________________________________________________________  Cass Garner MD     Procedures: see electronic medical records for all procedures/Xrays and details which were not copied into this note but were reviewed prior to creation of Plan. LABS:  I reviewed today's most current labs and imaging studies.   Pertinent labs include:  Recent Labs      01/18/17   0300  01/17/17   1135   WBC  11.6*  15.4*   HGB  8.2*  9.6*   HCT  25.0*  29.6*   PLT  346  488*     Recent Labs      01/19/17   0136  01/18/17   1245  01/18/17   0300  01/17/17   1135   NA  143   --   141  140   K  3.5 Plan  Treatment/Interventions: Education/ Instruction, Manual therapy, Taping techniques, Therapeutic activities, Therapeutic exercises  PT Plan: Skilled PT  PT Frequency: 2 times per week  Duration: 6-8 weeks  Onset Date: 08/19/24  Certification Period Start Date: 09/24/24  Certification Period End Date: 12/23/24  Number of Treatments Authorized: med nec   Education:   Much focus on breathing    Preeti Gama, PTA    Active       PT Problem       Pain       Start:  09/24/24    Expected End:  11/26/24       Patient will report reduction of pain by  80%  to ease performance of all ADLs, leisure activities and work/household tasks in order to return to PLOF.           ROM       Start:  09/24/24    Expected End:  11/26/24       Patient will demonstrate increased lumbar ROM  to WFL and without pain to ease the performance of prolonged standing and walking and all functional mobility.            Flexibility       Start:  09/24/24    Expected End:  11/26/24       Patient will demonstrate increased BLE flexibility to Good  in order to decrease pain, improve positioning and/or promote improved functional mobility.          HEP       Start:  09/24/24    Expected End:  11/26/24       Patient will be independent and compliant with safe and recommended  HEP to maximize and maintain functional gains made in physical therapy.  -to enhance functional progress and long term management of condition.           Outcome       Start:  09/24/24    Expected End:  11/26/24       Patient will improve outcome measures score on  LINDSAY by 15% to show a clinically significant improvement  in functional mobility.          Patient Stated Goal       Start:  09/24/24    Expected End:  11/26/24       Patient will negotiate one flight of steps with handrail and/or AD,  with 75% improved ease of performance.          Patient Stated Goal        Start:  09/24/24    Expected End:  11/26/24        Patient will report increased ease with rising from low  --   3.0*  3.5   CL  110*   --   115*  108   CO2  22   --   16*  18*   GLU  115*   --   103*  131*   BUN  15   --   20  25*   CREA  1.51*   --   1.65*  2.13*   CA  8.9   --   9.7  11.7*   MG  1.7   --    --    --    PHOS  1.2*  1.6*  2.4*   --    ALB  2.8*   --   3.2*  3.8   TBILI   --    --    --   0.3   SGOT   --    --    --   15   ALT   --    --    --   15 couch by 50%.

## 2024-10-09 ENCOUNTER — TREATMENT (OUTPATIENT)
Dept: PHYSICAL THERAPY | Facility: CLINIC | Age: 74
End: 2024-10-09
Payer: MEDICARE

## 2024-10-09 DIAGNOSIS — G89.29 CHRONIC LOW BACK PAIN WITHOUT SCIATICA, UNSPECIFIED BACK PAIN LATERALITY: ICD-10-CM

## 2024-10-09 DIAGNOSIS — M54.50 CHRONIC LOW BACK PAIN WITHOUT SCIATICA, UNSPECIFIED BACK PAIN LATERALITY: ICD-10-CM

## 2024-10-09 PROCEDURE — 97110 THERAPEUTIC EXERCISES: CPT | Mod: GP,CQ

## 2024-10-09 NOTE — PROGRESS NOTES
"Physical Therapy                                                                                  Physical Therapy Treatment       Patient name: Shabbir Laurent  MRN:   [id number]  Today's Date: 10/15/24     Time Calculation  Start Time: 1330  Stop Time: 1410  Time Calculation (min): 40 min  1. Chronic low back pain without sciatica, unspecified back pain laterality  Follow Up In Physical Therapy      2. Joint stiffness of spine        3. Weakness of trunk musculature        4. Muscle tightness               General  Reason for Referral: Chronic LBP M54.50  Referred By: Dr. Dominique Vance  Past Medical History Relevant to Rehab: [place] 4/2024, [place] 12/[place] ( aortic valve replacement and aortic aneurysm repair) heart stents 2012 and 2024,   [place] A/B, [place], [place] ( $0 USED PT/ST ) visit#3  Precautions : cardiac precautions     Subjective:Pt reports increase in symptoms felt in buttocks posterior thighs and anterior thighs radiating shooting sharp in description. Does note muscular soreness lateral thighs   Response to last session: ok       Pain  8-9/10 start of session  6-7/10 end of session     Treatment: Therapeutic Exercise 40 minutes   Sci fit x 10 min level 1 seat 15  Manually guided B LE stretching SKTC , piriformis cross body and press away, hip  Adductor stretch, lumbar rotation stretch ,HS stretch   x 25 minutes  TA bracing 10 reps x 5 \"  Assessment:  Symptoms radiating worse at arrival, calmed some but still present . Pt tight all motions . Bed mobility slightly improved , pt did not require physical assist to complete supine to sit transition. No increase in pain or symptoms  Plan:    OP PT Plan  Treatment/Interventions: Education/ Instruction, Manual therapy, Taping techniques, Therapeutic activities, Therapeutic exercises  PT Plan: Skilled PT  PT Frequency: 2 times per week  Duration: 6-8 weeks  Onset Date: 08/19/24  Certification Period Start Date: 09/24/24  Certification Period End Date: " 12/23/24  Number of Treatments Authorized: med mark Gama, PTA    Active       PT Problem       Pain       Start:  09/24/24    Expected End:  11/26/24       Patient will report reduction of pain by  80%  to ease performance of all ADLs, leisure activities and work/household tasks in order to return to PLOF.           ROM       Start:  09/24/24    Expected End:  11/26/24       Patient will demonstrate increased lumbar ROM  to WFL and without pain to ease the performance of prolonged standing and walking and all functional mobility.            Flexibility       Start:  09/24/24    Expected End:  11/26/24       Patient will demonstrate increased BLE flexibility to Good  in order to decrease pain, improve positioning and/or promote improved functional mobility.          HEP       Start:  09/24/24    Expected End:  11/26/24       Patient will be independent and compliant with safe and recommended  HEP to maximize and maintain functional gains made in physical therapy.  -to enhance functional progress and long term management of condition.           Outcome       Start:  09/24/24    Expected End:  11/26/24       Patient will improve outcome measures score on  LINDSAY by 15% to show a clinically significant improvement  in functional mobility.          Patient Stated Goal       Start:  09/24/24    Expected End:  11/26/24       Patient will negotiate one flight of steps with handrail and/or AD,  with 75% improved ease of performance.          Patient Stated Goal        Start:  09/24/24    Expected End:  11/26/24        Patient will report increased ease with rising from low couch by 50%.

## 2024-10-10 ENCOUNTER — APPOINTMENT (OUTPATIENT)
Dept: PHYSICAL THERAPY | Facility: CLINIC | Age: 74
End: 2024-10-10
Payer: MEDICARE

## 2024-10-15 ENCOUNTER — TREATMENT (OUTPATIENT)
Dept: PHYSICAL THERAPY | Facility: CLINIC | Age: 74
End: 2024-10-15
Payer: MEDICARE

## 2024-10-15 DIAGNOSIS — M62.89 MUSCLE TIGHTNESS: ICD-10-CM

## 2024-10-15 DIAGNOSIS — M54.50 CHRONIC LOW BACK PAIN WITHOUT SCIATICA, UNSPECIFIED BACK PAIN LATERALITY: Primary | ICD-10-CM

## 2024-10-15 DIAGNOSIS — G89.29 CHRONIC LOW BACK PAIN WITHOUT SCIATICA, UNSPECIFIED BACK PAIN LATERALITY: Primary | ICD-10-CM

## 2024-10-15 DIAGNOSIS — M25.60 JOINT STIFFNESS OF SPINE: ICD-10-CM

## 2024-10-15 DIAGNOSIS — M62.81 WEAKNESS OF TRUNK MUSCULATURE: ICD-10-CM

## 2024-10-15 PROCEDURE — 97110 THERAPEUTIC EXERCISES: CPT | Mod: GP,CQ

## 2024-10-15 NOTE — PROGRESS NOTES
"Physical Therapy                                                                                  Physical Therapy Treatment       Patient name: Shabbir Laurent  MRN:   [id number]  Today's Date: 10/15/24     Time Calculation  Start Time: 1430  Stop Time: 1515  Time Calculation (min): 45 min  1. Chronic low back pain without sciatica, unspecified back pain laterality  Follow Up In Physical Therapy      2. Joint stiffness of spine        3. Weakness of trunk musculature        4. Muscle tightness             General  Reason for Referral: Chronic LBP M54.50  Referred By: Dr. Dominique Vance  Past Medical History Relevant to Rehab: [place] 4/2024, [place] 12/[place] ( aortic valve replacement and aortic aneurysm repair) heart stents 2012 and 2024,   [place] A/B, [place], [place] ( $0 USED PT/ST ) visit#5    Precautions cardiac precautions     Subjective: Felt sore Friday . Felt significant relief of symptoms on Saturday->today.   Relief of pain felt BLE s , No sharp pain in back \"just sore\" .   Yesterday full day  with eye doc appt in [employer] and shopping at [employer] using cart for support \"It didn't hurt me\"  Response to last session:  Good   Performing HEP: further  re instruction required    Pain   6/10 upon arrival in back and buttocks slight notice of symptoms in LE s   Objective:    Treatment:    Therapeutic Exercise 40 minutes   Sci fit x 10 min level 1 seat 14  Manually guided B LE stretching SKTC , piriformis cross body and press away, hip  Adductor stretch, lumbar rotation stretch ,HS stretch   x 25 minutes  TA bracing 10 reps x 5 \"  Hook lying isoball squeeze hip Adduction 10 reps 5\" holds  Conversation and repositioning = 5 minutes  Assessment:  Keeping on the same path with program based on pt  favorable response to previous session. Able to  re add in another neutral core exercise also today .  Plan:    OP PT Plan  Treatment/Interventions: Education/ Instruction, Manual therapy, Taping techniques, " Therapeutic activities, Therapeutic exercises  PT Plan: Skilled PT  PT Frequency: 2 times per week  Duration: 6-8 weeks  Onset Date: 08/19/24  Certification Period Start Date: 09/24/24  Certification Period End Date: 12/23/24  Number of Treatments Authorized: paola Gama, DEB    Active       PT Problem       Pain       Start:  09/24/24    Expected End:  11/26/24       Patient will report reduction of pain by  80%  to ease performance of all ADLs, leisure activities and work/household tasks in order to return to PLOF.           ROM       Start:  09/24/24    Expected End:  11/26/24       Patient will demonstrate increased lumbar ROM  to WFL and without pain to ease the performance of prolonged standing and walking and all functional mobility.            Flexibility       Start:  09/24/24    Expected End:  11/26/24       Patient will demonstrate increased BLE flexibility to Good  in order to decrease pain, improve positioning and/or promote improved functional mobility.          HEP       Start:  09/24/24    Expected End:  11/26/24       Patient will be independent and compliant with safe and recommended  HEP to maximize and maintain functional gains made in physical therapy.  -to enhance functional progress and long term management of condition.           Outcome       Start:  09/24/24    Expected End:  11/26/24       Patient will improve outcome measures score on  LINDSAY by 15% to show a clinically significant improvement  in functional mobility.          Patient Stated Goal       Start:  09/24/24    Expected End:  11/26/24       Patient will negotiate one flight of steps with handrail and/or AD,  with 75% improved ease of performance.          Patient Stated Goal        Start:  09/24/24    Expected End:  11/26/24        Patient will report increased ease with rising from low couch by 50%.

## 2024-10-16 PROBLEM — Z95.5 S/P PRIMARY ANGIOPLASTY WITH CORONARY STENT: Status: RESOLVED | Noted: 2024-05-08 | Resolved: 2024-10-16

## 2024-10-16 PROBLEM — M19.042 PRIMARY OSTEOARTHRITIS, LEFT HAND: Status: ACTIVE | Noted: 2024-10-16

## 2024-10-17 ENCOUNTER — TREATMENT (OUTPATIENT)
Dept: PHYSICAL THERAPY | Facility: CLINIC | Age: 74
End: 2024-10-17
Payer: MEDICARE

## 2024-10-17 DIAGNOSIS — M25.60 JOINT STIFFNESS OF SPINE: ICD-10-CM

## 2024-10-17 DIAGNOSIS — M54.50 CHRONIC LOW BACK PAIN WITHOUT SCIATICA, UNSPECIFIED BACK PAIN LATERALITY: Primary | ICD-10-CM

## 2024-10-17 DIAGNOSIS — M62.81 WEAKNESS OF TRUNK MUSCULATURE: ICD-10-CM

## 2024-10-17 DIAGNOSIS — G89.29 CHRONIC LOW BACK PAIN WITHOUT SCIATICA, UNSPECIFIED BACK PAIN LATERALITY: Primary | ICD-10-CM

## 2024-10-17 DIAGNOSIS — M62.89 MUSCLE TIGHTNESS: ICD-10-CM

## 2024-10-17 PROCEDURE — 97110 THERAPEUTIC EXERCISES: CPT | Mod: GP

## 2024-10-17 NOTE — PROGRESS NOTES
Physical Therapy    Physical Therapy Treatment    Patient Name: Shabbir Laurent  MRN: [id number]  Today's Date: 10/17/2024    Time Entry:   Time Calculation  Start Time: 1146  Stop Time: 1232  Time Calculation (min): 46 min     PT Therapeutic Procedures Time Entry  Therapeutic Exercise Time Entry: 46     Assessment:  Patient tolerated treatment well without increased complaints of pain during or after session.  Patient tolerating LQ stretches well and reporting reduction of pain with these exercises. Appears to be progressing with present program.   Patient will continue to benefit from skilled PT services to address deficits/impairments contributing to limited functional abilities and pain.     Plan:  Assess response to today's session and adjust plan as needed.   Continue per POC, progressing as tolerated. Progress standing core and BLE strengthening exercises.        Current Problem  1. Chronic low back pain without sciatica, unspecified back pain laterality  Follow Up In Physical Therapy      2. Joint stiffness of spine        3. Weakness of trunk musculature        4. Muscle tightness            General  Reason for Referral: Chronic LBP M54.50  Referred By: Dr. Dominique Vance  Past Medical History Relevant to Rehab: [place] 4/2024, [place] 12/[place] ( aortic valve replacement and aortic aneurysm repair) heart stents 2012 and 2024,  [place] A/B, [place], [place] ( $0 USED PT/ST )   Visit 6    Subjective    States he notices a lot of improvement following the last 2 sessions.  Currently rates pain 6/10 in LB, notably left lower side.  Had a bad night sleeping last night, increased B leg pain.  Leg pain was better once she got up and started moving around.     Precautions  Denies recent falls  cardiac precautions     Pain  6/10 at the start of session  Reports less pain at the end of session, but no number given.     Treatments:  Therapeutic Exercise to decrease pain, increase ROM/flexibility, increase  "strength.  Sci fit x 10 min level 1 seat 14 - deferred this date due to unavailable    Hooklying  TA bracing with deep breathing 3\" hold 2 x 10 reps  PPT 3\" hold 1 x 10 reps  TA bracing with isometric B hip ADD ball squeeze 2 x 10 reps  SKTC R/L with strap 10\" x 10 reps  Piriformis stretch R/L with strap 5-10\" x 10 reps  Hamstrings stretch R/L with strap 10\" x 10 reps  Hip Adductor stretch R/L with strap 3 x 15 reps with manual assist   Bridges with PPT 2 x 10 reps  TA bracing alt R/L bent knee raise 2 x 10 reps        OP EDUCATION:  Continue with current HEP as instructed.   Educated in correct performance of therapeutic exercises.        Goals:  Active       PT Problem       Pain       Start:  09/24/24    Expected End:  11/26/24       Patient will report reduction of pain by  80%  to ease performance of all ADLs, leisure activities and work/household tasks in order to return to PLOF.           ROM       Start:  09/24/24    Expected End:  11/26/24       Patient will demonstrate increased lumbar ROM  to WFL and without pain to ease the performance of prolonged standing and walking and all functional mobility.            Flexibility       Start:  09/24/24    Expected End:  11/26/24       Patient will demonstrate increased BLE flexibility to Good  in order to decrease pain, improve positioning and/or promote improved functional mobility.          HEP       Start:  09/24/24    Expected End:  11/26/24       Patient will be independent and compliant with safe and recommended  HEP to maximize and maintain functional gains made in physical therapy.  -to enhance functional progress and long term management of condition.           Outcome       Start:  09/24/24    Expected End:  11/26/24       Patient will improve outcome measures score on  LINDSAY by 15% to show a clinically significant improvement  in functional mobility.          Patient Stated Goal       Start:  09/24/24    Expected End:  11/26/24       Patient will " negotiate one flight of steps with handrail and/or AD,  with 75% improved ease of performance.          Patient Stated Goal        Start:  09/24/24    Expected End:  11/26/24        Patient will report increased ease with rising from low couch by 50%.

## 2024-10-21 ENCOUNTER — TREATMENT (OUTPATIENT)
Dept: PHYSICAL THERAPY | Facility: CLINIC | Age: 74
End: 2024-10-21
Payer: MEDICARE

## 2024-10-21 ENCOUNTER — APPOINTMENT (OUTPATIENT)
Dept: PRIMARY CARE | Facility: CLINIC | Age: 74
End: 2024-10-21
Payer: MEDICARE

## 2024-10-21 DIAGNOSIS — M54.50 CHRONIC LOW BACK PAIN WITHOUT SCIATICA, UNSPECIFIED BACK PAIN LATERALITY: Primary | ICD-10-CM

## 2024-10-21 DIAGNOSIS — G89.29 CHRONIC LOW BACK PAIN WITHOUT SCIATICA, UNSPECIFIED BACK PAIN LATERALITY: Primary | ICD-10-CM

## 2024-10-21 DIAGNOSIS — Z95.2 MECHANICAL HEART VALVE PRESENT: ICD-10-CM

## 2024-10-21 DIAGNOSIS — M25.60 JOINT STIFFNESS OF SPINE: ICD-10-CM

## 2024-10-21 DIAGNOSIS — M62.89 MUSCLE TIGHTNESS: ICD-10-CM

## 2024-10-21 DIAGNOSIS — M62.81 WEAKNESS OF TRUNK MUSCULATURE: ICD-10-CM

## 2024-10-21 PROCEDURE — 97110 THERAPEUTIC EXERCISES: CPT | Mod: GP,CQ

## 2024-10-21 RX ORDER — WARFARIN SODIUM 5 MG/1
TABLET ORAL
Qty: 124 TABLET | Refills: 3 | Status: SHIPPED | OUTPATIENT
Start: 2024-10-21

## 2024-10-21 NOTE — PROGRESS NOTES
"Physical Therapy                                                                                  Physical Therapy Treatment       Patient name: Shabbir Laurent  MRN:   [id number]  Today's Date: 10/21/24     Time Calculation  Start Time: 1345  Stop Time: 1430  Time Calculation (min): 45 min  1. Chronic low back pain without sciatica, unspecified back pain laterality  Follow Up In Physical Therapy      2. Joint stiffness of spine        3. Weakness of trunk musculature        4. Muscle tightness               General  Reason for Referral: Chronic LBP M54.50  Referred By: Dr. Dominique Vance  Past Medical History Relevant to Rehab: [place] 4/2024, [place] 12/[place] ( aortic valve replacement and aortic aneurysm repair) heart stents 2012 and 2024,   [place] A/B, [place], [place] ( $0 USED PT/ST ) visit#7  Precautions  cardiac precautions     Subjective: Sleeping has improved . Feels ready for more specific HEP.  Response to last session:  good   Performing HEP: instructed in HEP today to continue at home     Pain   6/10  3/10 end of session    Treatment:    Therapeutic Exercise 45 minutes to decrease pain, increase ROM/flexibility, increase strength.  Sci fit x 10 min level 1 seat 14  Hook lying HS stretch with strap 20\" x 3 bilaterally  Hook lying towel support piriformis stretch 20\" x 3 bilaterally  SKFO 20\" holds 3 reps each  Butterfly groin stretch 20\" x 3 reps  Hook lying SKTC with towel support 20\" x 3 bilaterally  Hook lying LTR 20\" x 3 bilaterally  Assessment:  Pt demonstrates adequate return performance with good understanding of exercises assigned today to HEP . Tolerated session well without increase in pain or symptoms  Plan:    OP PT Plan  Treatment/Interventions: Education/ Instruction, Manual therapy, Taping techniques, Therapeutic activities, Therapeutic exercises  PT Plan: Skilled PT  PT Frequency: 2 times per week  Duration: 6-8 weeks  Onset Date: 08/19/24  Certification Period Start Date: " 09/24/24  Certification Period End Date: 12/23/24  Number of Treatments Authorized: med nec   Education:   Access Code: [email or web address]  URL: [email or web address]  Date: 10/21/2024  Prepared by: Preeti Gama    Exercises  - Hooklying Hamstring Stretch with Strap   - Supine Piriformis Stretch with Towel   - Hooklying Single Knee to Chest Stretch with Towel   - Supine Hip Adductor Stretch   - Supine Lower Trunk Rotation   - Supine Butterfly Groin Stretch   Preeti Gama, PTA    Active       PT Problem       Pain       Start:  09/24/24    Expected End:  11/26/24       Patient will report reduction of pain by  80%  to ease performance of all ADLs, leisure activities and work/household tasks in order to return to PLOF.           ROM       Start:  09/24/24    Expected End:  11/26/24       Patient will demonstrate increased lumbar ROM  to WFL and without pain to ease the performance of prolonged standing and walking and all functional mobility.            Flexibility       Start:  09/24/24    Expected End:  11/26/24       Patient will demonstrate increased BLE flexibility to Good  in order to decrease pain, improve positioning and/or promote improved functional mobility.          HEP       Start:  09/24/24    Expected End:  11/26/24       Patient will be independent and compliant with safe and recommended  HEP to maximize and maintain functional gains made in physical therapy.  -to enhance functional progress and long term management of condition.           Outcome       Start:  09/24/24    Expected End:  11/26/24       Patient will improve outcome measures score on  LINDSAY by 15% to show a clinically significant improvement  in functional mobility.          Patient Stated Goal       Start:  09/24/24    Expected End:  11/26/24       Patient will negotiate one flight of steps with handrail and/or AD,  with 75% improved ease of performance.          Patient Stated Goal        Start:  09/24/24     Expected End:  11/26/24        Patient will report increased ease with rising from low couch by 50%.

## 2024-10-24 DIAGNOSIS — G60.9 HEREDITARY AND IDIOPATHIC NEUROPATHY: ICD-10-CM

## 2024-10-25 ENCOUNTER — TREATMENT (OUTPATIENT)
Dept: PHYSICAL THERAPY | Facility: CLINIC | Age: 74
End: 2024-10-25
Payer: MEDICARE

## 2024-10-25 DIAGNOSIS — M25.60 JOINT STIFFNESS OF SPINE: ICD-10-CM

## 2024-10-25 DIAGNOSIS — M54.50 CHRONIC LOW BACK PAIN WITHOUT SCIATICA, UNSPECIFIED BACK PAIN LATERALITY: Primary | ICD-10-CM

## 2024-10-25 DIAGNOSIS — I95.89: ICD-10-CM

## 2024-10-25 DIAGNOSIS — M62.89 MUSCLE TIGHTNESS: ICD-10-CM

## 2024-10-25 DIAGNOSIS — M62.81 WEAKNESS OF TRUNK MUSCULATURE: ICD-10-CM

## 2024-10-25 DIAGNOSIS — G89.29 CHRONIC LOW BACK PAIN WITHOUT SCIATICA, UNSPECIFIED BACK PAIN LATERALITY: Primary | ICD-10-CM

## 2024-10-25 DIAGNOSIS — I21.4 NSTEMI (NON-ST ELEVATED MYOCARDIAL INFARCTION) (MULTI): ICD-10-CM

## 2024-10-25 PROCEDURE — 97110 THERAPEUTIC EXERCISES: CPT | Mod: GP,CQ

## 2024-10-25 RX ORDER — PREGABALIN 200 MG/1
200 CAPSULE ORAL NIGHTLY
Qty: 90 CAPSULE | Refills: 3 | Status: SHIPPED | OUTPATIENT
Start: 2024-10-25

## 2024-10-25 RX ORDER — METOPROLOL TARTRATE 25 MG/1
25 TABLET, FILM COATED ORAL DAILY
Qty: 90 TABLET | Refills: 3 | Status: SHIPPED | OUTPATIENT
Start: 2024-10-25
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
## 2024-10-28 ENCOUNTER — APPOINTMENT (OUTPATIENT)
Dept: PRIMARY CARE | Facility: CLINIC | Age: 74
End: 2024-10-28
Payer: COMMERCIAL

## 2024-10-28 DIAGNOSIS — I95.89: ICD-10-CM

## 2024-10-28 DIAGNOSIS — I21.4 NSTEMI (NON-ST ELEVATED MYOCARDIAL INFARCTION) (MULTI): ICD-10-CM

## 2024-10-28 DIAGNOSIS — Z95.2 HISTORY OF HEART VALVE REPLACEMENT WITH MECHANICAL VALVE: ICD-10-CM

## 2024-10-28 LAB
POC INR: 2.3
POC PROTHROMBIN TIME: NORMAL

## 2024-10-28 PROCEDURE — 85610 PROTHROMBIN TIME: CPT | Performed by: FAMILY MEDICINE

## 2024-10-28 RX ORDER — METOPROLOL TARTRATE 25 MG/1
25 TABLET, FILM COATED ORAL DAILY
Qty: 90 TABLET | Refills: 3 | Status: SHIPPED | OUTPATIENT
Start: 2024-10-28

## 2024-10-29 ENCOUNTER — TREATMENT (OUTPATIENT)
Dept: PHYSICAL THERAPY | Facility: CLINIC | Age: 74
End: 2024-10-29
Payer: MEDICARE

## 2024-10-29 DIAGNOSIS — G89.29 CHRONIC LOW BACK PAIN WITHOUT SCIATICA, UNSPECIFIED BACK PAIN LATERALITY: Primary | ICD-10-CM

## 2024-10-29 DIAGNOSIS — M62.81 WEAKNESS OF TRUNK MUSCULATURE: ICD-10-CM

## 2024-10-29 DIAGNOSIS — M62.89 MUSCLE TIGHTNESS: ICD-10-CM

## 2024-10-29 DIAGNOSIS — M25.60 JOINT STIFFNESS OF SPINE: ICD-10-CM

## 2024-10-29 DIAGNOSIS — M54.50 CHRONIC LOW BACK PAIN WITHOUT SCIATICA, UNSPECIFIED BACK PAIN LATERALITY: Primary | ICD-10-CM

## 2024-10-29 PROCEDURE — 97110 THERAPEUTIC EXERCISES: CPT | Mod: GP

## 2024-10-31 ENCOUNTER — TREATMENT (OUTPATIENT)
Dept: PHYSICAL THERAPY | Facility: CLINIC | Age: 74
End: 2024-10-31
Payer: MEDICARE

## 2024-10-31 DIAGNOSIS — M54.50 CHRONIC LOW BACK PAIN WITHOUT SCIATICA, UNSPECIFIED BACK PAIN LATERALITY: Primary | ICD-10-CM

## 2024-10-31 DIAGNOSIS — M25.60 JOINT STIFFNESS OF SPINE: ICD-10-CM

## 2024-10-31 DIAGNOSIS — M62.81 WEAKNESS OF TRUNK MUSCULATURE: ICD-10-CM

## 2024-10-31 DIAGNOSIS — G89.29 CHRONIC LOW BACK PAIN WITHOUT SCIATICA, UNSPECIFIED BACK PAIN LATERALITY: Primary | ICD-10-CM

## 2024-10-31 DIAGNOSIS — M62.89 MUSCLE TIGHTNESS: ICD-10-CM

## 2024-10-31 PROCEDURE — 97110 THERAPEUTIC EXERCISES: CPT | Mod: GP

## 2024-11-01 ENCOUNTER — APPOINTMENT (OUTPATIENT)
Dept: PRIMARY CARE | Facility: CLINIC | Age: 74
End: 2024-11-01
Payer: MEDICARE

## 2024-11-01 VITALS
OXYGEN SATURATION: 98 % | BODY MASS INDEX: 36.03 KG/M2 | DIASTOLIC BLOOD PRESSURE: 68 MMHG | WEIGHT: 266 LBS | SYSTOLIC BLOOD PRESSURE: 128 MMHG | HEIGHT: 72 IN | HEART RATE: 55 BPM

## 2024-11-01 DIAGNOSIS — E66.01 CLASS 2 SEVERE OBESITY DUE TO EXCESS CALORIES WITH SERIOUS COMORBIDITY AND BODY MASS INDEX (BMI) OF 36.0 TO 36.9 IN ADULT: ICD-10-CM

## 2024-11-01 DIAGNOSIS — G95.9 CERVICAL MYELOPATHY: ICD-10-CM

## 2024-11-01 DIAGNOSIS — I25.10 CORONARY ARTERY DISEASE INVOLVING NATIVE CORONARY ARTERY OF NATIVE HEART WITHOUT ANGINA PECTORIS: ICD-10-CM

## 2024-11-01 DIAGNOSIS — I21.4 NSTEMI (NON-ST ELEVATED MYOCARDIAL INFARCTION) (MULTI): ICD-10-CM

## 2024-11-01 DIAGNOSIS — E11.42 TYPE 2 DIABETES MELLITUS WITH DIABETIC POLYNEUROPATHY, WITHOUT LONG-TERM CURRENT USE OF INSULIN: ICD-10-CM

## 2024-11-01 DIAGNOSIS — E66.812 CLASS 2 SEVERE OBESITY DUE TO EXCESS CALORIES WITH SERIOUS COMORBIDITY AND BODY MASS INDEX (BMI) OF 36.0 TO 36.9 IN ADULT: ICD-10-CM

## 2024-11-01 DIAGNOSIS — J45.21 MILD INTERMITTENT ASTHMA WITH ACUTE EXACERBATION (HHS-HCC): ICD-10-CM

## 2024-11-01 DIAGNOSIS — E78.2 MIXED HYPERLIPIDEMIA: ICD-10-CM

## 2024-11-01 DIAGNOSIS — Z00.00 ROUTINE GENERAL MEDICAL EXAMINATION AT HEALTH CARE FACILITY: Primary | ICD-10-CM

## 2024-11-01 DIAGNOSIS — M06.4 INFLAMMATORY POLYARTHROPATHY (MULTI): ICD-10-CM

## 2024-11-01 DIAGNOSIS — F19.982: ICD-10-CM

## 2024-11-01 DIAGNOSIS — M13.0 POLYARTHRITIS: ICD-10-CM

## 2024-11-01 DIAGNOSIS — I73.9 PVD (PERIPHERAL VASCULAR DISEASE) (CMS-HCC): ICD-10-CM

## 2024-11-01 DIAGNOSIS — E03.9 ACQUIRED HYPOTHYROIDISM: ICD-10-CM

## 2024-11-01 DIAGNOSIS — F32.0 CURRENT MILD EPISODE OF MAJOR DEPRESSIVE DISORDER WITHOUT PRIOR EPISODE (CMS-HCC): ICD-10-CM

## 2024-11-01 DIAGNOSIS — G60.9 HEREDITARY AND IDIOPATHIC NEUROPATHY: ICD-10-CM

## 2024-11-01 DIAGNOSIS — Z12.5 SCREENING FOR MALIGNANT NEOPLASM OF PROSTATE: ICD-10-CM

## 2024-11-01 DIAGNOSIS — I10 BENIGN ESSENTIAL HYPERTENSION: ICD-10-CM

## 2024-11-01 DIAGNOSIS — G47.33 OBSTRUCTIVE SLEEP APNEA ON CPAP: ICD-10-CM

## 2024-11-01 LAB
ALBUMIN SERPL BCP-MCNC: 4 G/DL (ref 3.4–5)
ALP SERPL-CCNC: 83 U/L (ref 33–136)
ALT SERPL W P-5'-P-CCNC: 12 U/L (ref 10–52)
ANION GAP SERPL CALC-SCNC: 14 MMOL/L (ref 10–20)
AST SERPL W P-5'-P-CCNC: 19 U/L (ref 9–39)
BILIRUB SERPL-MCNC: 0.6 MG/DL (ref 0–1.2)
BUN SERPL-MCNC: 19 MG/DL (ref 6–23)
CALCIUM SERPL-MCNC: 9 MG/DL (ref 8.6–10.3)
CHLORIDE SERPL-SCNC: 105 MMOL/L (ref 98–107)
CHOLEST SERPL-MCNC: 147 MG/DL (ref 0–199)
CHOLESTEROL/HDL RATIO: 3.6
CO2 SERPL-SCNC: 23 MMOL/L (ref 21–32)
CREAT SERPL-MCNC: 1.06 MG/DL (ref 0.5–1.3)
EGFRCR SERPLBLD CKD-EPI 2021: 74 ML/MIN/1.73M*2
GLUCOSE SERPL-MCNC: 131 MG/DL (ref 74–99)
HDLC SERPL-MCNC: 41.2 MG/DL
LDLC SERPL CALC-MCNC: 29 MG/DL
NON HDL CHOLESTEROL: 106 MG/DL (ref 0–149)
POC HEMOGLOBIN A1C: 6.4 % (ref 4.2–6.5)
POTASSIUM SERPL-SCNC: 4.1 MMOL/L (ref 3.5–5.3)
PROT SERPL-MCNC: 6.6 G/DL (ref 6.4–8.2)
PSA SERPL-MCNC: 3.7 NG/ML
SODIUM SERPL-SCNC: 138 MMOL/L (ref 136–145)
T4 FREE SERPL-MCNC: 0.71 NG/DL (ref 0.61–1.12)
TRIGL SERPL-MCNC: 386 MG/DL (ref 0–149)
TSH SERPL-ACNC: 5.84 MIU/L (ref 0.44–3.98)
VLDL: 77 MG/DL (ref 0–40)

## 2024-11-01 PROCEDURE — 99214 OFFICE O/P EST MOD 30 MIN: CPT | Performed by: FAMILY MEDICINE

## 2024-11-01 PROCEDURE — 1123F ACP DISCUSS/DSCN MKR DOCD: CPT | Performed by: FAMILY MEDICINE

## 2024-11-01 PROCEDURE — 3078F DIAST BP <80 MM HG: CPT | Performed by: FAMILY MEDICINE

## 2024-11-01 PROCEDURE — 1158F ADVNC CARE PLAN TLK DOCD: CPT | Performed by: FAMILY MEDICINE

## 2024-11-01 PROCEDURE — 1170F FXNL STATUS ASSESSED: CPT | Performed by: FAMILY MEDICINE

## 2024-11-01 PROCEDURE — G0439 PPPS, SUBSEQ VISIT: HCPCS | Performed by: FAMILY MEDICINE

## 2024-11-01 PROCEDURE — 83036 HEMOGLOBIN GLYCOSYLATED A1C: CPT | Performed by: FAMILY MEDICINE

## 2024-11-01 PROCEDURE — 1159F MED LIST DOCD IN RCRD: CPT | Performed by: FAMILY MEDICINE

## 2024-11-01 PROCEDURE — 3074F SYST BP LT 130 MM HG: CPT | Performed by: FAMILY MEDICINE

## 2024-11-01 PROCEDURE — 4010F ACE/ARB THERAPY RXD/TAKEN: CPT | Performed by: FAMILY MEDICINE

## 2024-11-01 PROCEDURE — 3048F LDL-C <100 MG/DL: CPT | Performed by: FAMILY MEDICINE

## 2024-11-01 PROCEDURE — G0103 PSA SCREENING: HCPCS

## 2024-11-01 PROCEDURE — 84439 ASSAY OF FREE THYROXINE: CPT

## 2024-11-01 PROCEDURE — 3008F BODY MASS INDEX DOCD: CPT | Performed by: FAMILY MEDICINE

## 2024-11-01 PROCEDURE — 84443 ASSAY THYROID STIM HORMONE: CPT

## 2024-11-01 PROCEDURE — 80053 COMPREHEN METABOLIC PANEL: CPT

## 2024-11-01 PROCEDURE — 1160F RVW MEDS BY RX/DR IN RCRD: CPT | Performed by: FAMILY MEDICINE

## 2024-11-01 PROCEDURE — 80061 LIPID PANEL: CPT

## 2024-11-01 PROCEDURE — 1036F TOBACCO NON-USER: CPT | Performed by: FAMILY MEDICINE

## 2024-11-01 RX ORDER — FLUOXETINE HYDROCHLORIDE 20 MG/1
20 CAPSULE ORAL DAILY
Qty: 90 CAPSULE | Refills: 3 | Status: SHIPPED | OUTPATIENT
Start: 2024-11-01 | End: 2025-11-01

## 2024-11-01 RX ORDER — PREGABALIN 200 MG/1
200 CAPSULE ORAL NIGHTLY
Qty: 90 CAPSULE | Refills: 3 | Status: SHIPPED | OUTPATIENT
Start: 2024-11-01

## 2024-11-01 RX ORDER — LEFLUNOMIDE 20 MG/1
20 TABLET ORAL DAILY
Qty: 90 TABLET | Refills: 3 | Status: SHIPPED | OUTPATIENT
Start: 2024-11-01

## 2024-11-01 RX ORDER — FOLIC ACID 1 MG/1
1 TABLET ORAL EVERY MORNING
Qty: 90 TABLET | Refills: 3 | Status: SHIPPED | OUTPATIENT
Start: 2024-11-01 | End: 2025-11-01

## 2024-11-01 ASSESSMENT — ENCOUNTER SYMPTOMS
COUGH: 0
POLYPHAGIA: 0
ABDOMINAL PAIN: 0
BLOOD IN STOOL: 0
NERVOUS/ANXIOUS: 0
SORE THROAT: 0
POLYDIPSIA: 0
ADENOPATHY: 0
DIZZINESS: 0
FEVER: 0
CHEST TIGHTNESS: 0
ARTHRALGIAS: 0
BACK PAIN: 1
CONSTIPATION: 0
PALPITATIONS: 0
NUMBNESS: 1
NAUSEA: 0
EYE REDNESS: 0
HEADACHES: 0
VOMITING: 0
DYSURIA: 0
BRUISES/BLEEDS EASILY: 0
FREQUENCY: 0
DIARRHEA: 0
COLOR CHANGE: 0
WEAKNESS: 0
EYE PAIN: 0
TREMORS: 0
TROUBLE SWALLOWING: 0
HEMATURIA: 0
DYSPHORIC MOOD: 0
WHEEZING: 0
CHILLS: 0
SHORTNESS OF BREATH: 0
FATIGUE: 1

## 2024-11-01 ASSESSMENT — ACTIVITIES OF DAILY LIVING (ADL)
DRESSING: INDEPENDENT
TAKING_MEDICATION: INDEPENDENT
MANAGING_FINANCES: INDEPENDENT
BATHING: INDEPENDENT
DOING_HOUSEWORK: INDEPENDENT
GROCERY_SHOPPING: INDEPENDENT

## 2024-11-04 ENCOUNTER — APPOINTMENT (OUTPATIENT)
Dept: PHYSICAL THERAPY | Facility: CLINIC | Age: 74
End: 2024-11-04
Payer: MEDICARE

## 2024-11-04 ENCOUNTER — APPOINTMENT (OUTPATIENT)
Dept: CARDIOLOGY | Facility: CLINIC | Age: 74
End: 2024-11-04
Payer: MEDICARE

## 2024-11-04 VITALS
HEART RATE: 76 BPM | OXYGEN SATURATION: 97 % | WEIGHT: 262 LBS | DIASTOLIC BLOOD PRESSURE: 78 MMHG | HEIGHT: 72 IN | BODY MASS INDEX: 35.49 KG/M2 | SYSTOLIC BLOOD PRESSURE: 122 MMHG

## 2024-11-04 DIAGNOSIS — R06.09 EXERTIONAL DYSPNEA: ICD-10-CM

## 2024-11-04 DIAGNOSIS — R00.1 BRADYCARDIA: ICD-10-CM

## 2024-11-04 DIAGNOSIS — I21.4 NSTEMI (NON-ST ELEVATED MYOCARDIAL INFARCTION) (MULTI): ICD-10-CM

## 2024-11-04 DIAGNOSIS — R53.1 WEAKNESS: ICD-10-CM

## 2024-11-04 DIAGNOSIS — I10 BENIGN ESSENTIAL HYPERTENSION: ICD-10-CM

## 2024-11-04 DIAGNOSIS — Z95.2 HISTORY OF HEART VALVE REPLACEMENT WITH MECHANICAL VALVE: ICD-10-CM

## 2024-11-04 DIAGNOSIS — E78.2 MIXED HYPERLIPIDEMIA: ICD-10-CM

## 2024-11-04 DIAGNOSIS — I25.10 CORONARY ARTERY DISEASE INVOLVING NATIVE CORONARY ARTERY OF NATIVE HEART WITHOUT ANGINA PECTORIS: Primary | ICD-10-CM

## 2024-11-04 DIAGNOSIS — Z95.1 HISTORY OF CORONARY ARTERY BYPASS GRAFT X 1: ICD-10-CM

## 2024-11-04 DIAGNOSIS — I50.32 CHRONIC DIASTOLIC CONGESTIVE HEART FAILURE: ICD-10-CM

## 2024-11-04 PROBLEM — R07.9 CHEST PAIN: Status: RESOLVED | Noted: 2023-03-03 | Resolved: 2024-11-04

## 2024-11-04 PROCEDURE — 99214 OFFICE O/P EST MOD 30 MIN: CPT | Performed by: INTERNAL MEDICINE

## 2024-11-04 PROCEDURE — 3048F LDL-C <100 MG/DL: CPT | Performed by: INTERNAL MEDICINE

## 2024-11-04 PROCEDURE — 3074F SYST BP LT 130 MM HG: CPT | Performed by: INTERNAL MEDICINE

## 2024-11-04 PROCEDURE — 1036F TOBACCO NON-USER: CPT | Performed by: INTERNAL MEDICINE

## 2024-11-04 PROCEDURE — 3008F BODY MASS INDEX DOCD: CPT | Performed by: INTERNAL MEDICINE

## 2024-11-04 PROCEDURE — 3078F DIAST BP <80 MM HG: CPT | Performed by: INTERNAL MEDICINE

## 2024-11-04 PROCEDURE — 1123F ACP DISCUSS/DSCN MKR DOCD: CPT | Performed by: INTERNAL MEDICINE

## 2024-11-04 PROCEDURE — 4010F ACE/ARB THERAPY RXD/TAKEN: CPT | Performed by: INTERNAL MEDICINE

## 2024-11-04 PROCEDURE — 1159F MED LIST DOCD IN RCRD: CPT | Performed by: INTERNAL MEDICINE

## 2024-11-04 ASSESSMENT — ENCOUNTER SYMPTOMS
BACK PAIN: 1
DYSPNEA ON EXERTION: 1
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
## 2024-11-04 NOTE — PATIENT INSTRUCTIONS
If you proceed with spinal blocks, you will need to stop the clopidogrel (plavix) 75 mg 7 days prior to the procedure.    Stop the coumadin 7 days prior to the procedure 3 days, 2 days and the day before the procedure take the Lovenox.    After the procedure restart coumadin and take the Lovenox 4 days after the procedure.

## 2024-11-05 ENCOUNTER — TREATMENT (OUTPATIENT)
Dept: PHYSICAL THERAPY | Facility: CLINIC | Age: 74
End: 2024-11-05
Payer: MEDICARE

## 2024-11-05 DIAGNOSIS — M25.60 JOINT STIFFNESS OF SPINE: ICD-10-CM

## 2024-11-05 DIAGNOSIS — G89.29 CHRONIC LOW BACK PAIN WITHOUT SCIATICA, UNSPECIFIED BACK PAIN LATERALITY: Primary | ICD-10-CM

## 2024-11-05 DIAGNOSIS — M54.50 CHRONIC LOW BACK PAIN WITHOUT SCIATICA, UNSPECIFIED BACK PAIN LATERALITY: Primary | ICD-10-CM

## 2024-11-05 DIAGNOSIS — M62.81 WEAKNESS OF TRUNK MUSCULATURE: ICD-10-CM

## 2024-11-05 DIAGNOSIS — M62.89 MUSCLE TIGHTNESS: ICD-10-CM

## 2024-11-05 PROCEDURE — 97530 THERAPEUTIC ACTIVITIES: CPT | Mod: GP

## 2024-11-05 PROCEDURE — 97110 THERAPEUTIC EXERCISES: CPT | Mod: GP
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
## 2024-11-07 ENCOUNTER — TREATMENT (OUTPATIENT)
Dept: PHYSICAL THERAPY | Facility: CLINIC | Age: 74
End: 2024-11-07
Payer: MEDICARE

## 2024-11-07 DIAGNOSIS — M62.89 MUSCLE TIGHTNESS: ICD-10-CM

## 2024-11-07 DIAGNOSIS — M54.50 CHRONIC LOW BACK PAIN WITHOUT SCIATICA, UNSPECIFIED BACK PAIN LATERALITY: Primary | ICD-10-CM

## 2024-11-07 DIAGNOSIS — M62.81 WEAKNESS OF TRUNK MUSCULATURE: ICD-10-CM

## 2024-11-07 DIAGNOSIS — G89.29 CHRONIC LOW BACK PAIN WITHOUT SCIATICA, UNSPECIFIED BACK PAIN LATERALITY: Primary | ICD-10-CM

## 2024-11-07 DIAGNOSIS — M25.60 JOINT STIFFNESS OF SPINE: ICD-10-CM

## 2024-11-07 PROCEDURE — 97110 THERAPEUTIC EXERCISES: CPT | Mod: GP

## 2024-11-07 NOTE — PROGRESS NOTES
"Physical Therapy    Physical Therapy Treatment/PT Discharge Summary    Patient Name: Shabbir Laurent  MRN: [id number]  Today's Date: 11/7/2024    Time Entry:   Time Calculation  Start Time: 1017  Stop Time: 1102  Time Calculation (min): 45 min     PT Therapeutic Procedures Time Entry  Therapeutic Exercise Time Entry: 45      Assessment:  Patient has made good progress in PT.  PT goals achieved or partially achieved.  Patient reports he feels he is ready for discharge and will work on home exercises by himself. Reviewed and prioritized HEP this date.  Patient to continue indep with HEP as instructed and follow up with MD as needed.  Patient advised to stop any exercise that causes lasting increase in pain.  Patient verbalizes understanding and agreement. See below for goal status.   Reports overall 40% better.     Plan:   Discharge PT.  Continue indep with HEP.    Current Problem  1. Chronic low back pain without sciatica, unspecified back pain laterality  Follow Up In Physical Therapy      2. Joint stiffness of spine        3. Weakness of trunk musculature        4. Muscle tightness            General  Reason for Referral: Chronic LBP M54.50  Referred By: Dr. Dominique Vance  Past Medical History Relevant to Rehab: MI 4/2024, [place] surgery 12/2003 ( aortic valve replacement and aortic aneurysm repair) heart stents 2012 and 2024,   MEDICARE A/B, MMO SUPP, [place], NO AUTH ( $0 USED PT/ST )   visit# 12          Subjective    Patient reports increased blood sugar levels this morning. Patient reports he feels a little \"off\" .  Had some dizziness this AM, but resolved now.  LBP rated 3/10 with very slight pain in B thighs. States he needs to have B TKA and pain in thighs may be muscular.  Denies recent falls. No other adverse complaints.     Precautions  Cardiac precautions     Pain  3/10 at the start and end of session.     Treatments:  Reviewed and performed the following exercises:  Hooklying  TA bracing with deep breathing 5\" " "hold x 5 reps  TA bracing with ball squeeze 5\" hold x 5 reps  TA bracing with marching 1 x 10 reps  Single Knee to Chest Stretch with Towel 10\" hold x 5 reps  Piriformis Stretch with Foot on Ground  10\" hold x 5 reps  Butterfly Groin Stretch 2 x 10\" hold  Beginner Bridge  1 x 5 reps    Standing with BUE support  B heel/toe raises 1 x 10 reps  MIP 1 x 10 reps  R/L hip ABD 1 x 10 reps  Sidestepping along rail 1 x 5 ft    Written instructions provided for patient reference.    OP EDUCATION:  Access Code: [email or web address]  URL: [email or web address]  Date: 11/07/2024  Prepared by: Dianne Baeza    Program Notes  Stop any exercise that causes increased lasting pain in low back or pain in legs.  You can split the exercises into 2 parts and do half one day and half the next day.  or just do several exercises once a day and stitch off.     Exercises  - Supine Transversus Abdominis Bracing - Hands on Stomach  - 1 x daily - 7 x weekly - 2 sets - 10 reps - 5\" hold  - Supine March  - 1 x daily - 7 x weekly - 2 sets - 10 reps - 5\" hold  - Hooklying Hamstring Stretch with Strap  - 1 x daily - 7 x weekly - 1 sets - 3 reps - 20\" hold  - Hooklying Single Knee to Chest Stretch with Towel  - 1 x daily - 7 x weekly - 1 sets - 10 reps - 10\" hold  - Supine Piriformis Stretch with Foot on Ground  - 1 x daily - 7 x weekly - 1 sets - 10 reps - 10\" hold  - Supine Butterfly Groin Stretch  - 1 x daily - 7 x weekly - 1 sets - 3 reps - 20\" hold  - Supine Knee Extension Strengthening  - 10 reps - 5\" hold  - Beginner Bridge  - 1 x daily - 7 x weekly - 1 sets - 10 reps - 3\" hold  - Standing March with Counter Support  - 1 x daily - 7 x weekly - 2 sets - 10 reps  - Standing Hip Abduction with Counter Support  - 1 x daily - 7 x weekly - 2 sets - 10 reps  - Heel Toe Raises with Counter Support  - 1 x daily - 7 x weekly - 2 sets - 10 reps  - Side Stepping with Counter Support  - 1 x daily - 7 x weekly - 3 sets - 10 reps   "     Goals:  Resolved       PT Problem       Pain (Progressing)       Start:  09/24/24    Expected End:  11/26/24    Resolved:  11/07/24    Patient will report reduction of pain by  80%  to ease performance of all ADLs, leisure activities and work/household tasks in order to return to PLOF.           ROM (Progressing)       Start:  09/24/24    Expected End:  11/26/24    Resolved:  11/07/24    Patient will demonstrate increased lumbar ROM  to WFL and without pain to ease the performance of prolonged standing and walking and all functional mobility.            Flexibility (Progressing)       Start:  09/24/24    Expected End:  11/26/24    Resolved:  11/07/24    Patient will demonstrate increased BLE flexibility to Good  in order to decrease pain, improve positioning and/or promote improved functional mobility.          HEP (Met)       Start:  09/24/24    Expected End:  11/26/24    Resolved:  11/07/24    Patient will be independent and compliant with safe and recommended  HEP to maximize and maintain functional gains made in physical therapy.  -to enhance functional progress and long term management of condition.           Outcome (Progressing)       Start:  09/24/24    Expected End:  11/26/24    Resolved:  11/07/24    Patient will improve outcome measures score on  LINDSAY by 15% to show a clinically significant improvement  in functional mobility.          Patient Stated Goal (Progressing)       Start:  09/24/24    Expected End:  11/26/24    Resolved:  11/07/24    Patient will negotiate one flight of steps with handrail and/or AD,  with 75% improved ease of performance.          Patient Stated Goal  (Not Progressing)       Start:  09/24/24    Expected End:  11/26/24    Resolved:  11/07/24     Patient will report increased ease with rising from low couch by 50%.

## 2024-11-11 ENCOUNTER — APPOINTMENT (OUTPATIENT)
Dept: PRIMARY CARE | Facility: CLINIC | Age: 74
End: 2024-11-11
Payer: MEDICARE

## 2024-11-11 DIAGNOSIS — Z95.2 HISTORY OF HEART VALVE REPLACEMENT WITH MECHANICAL VALVE: ICD-10-CM

## 2024-11-11 LAB
POC INR: 2.7 (ref 2.5–3.5)
POC PROTHROMBIN TIME: 32.9

## 2024-11-11 PROCEDURE — 85610 PROTHROMBIN TIME: CPT | Performed by: FAMILY MEDICINE

## 2024-12-03 ENCOUNTER — APPOINTMENT (OUTPATIENT)
Dept: PRIMARY CARE | Facility: CLINIC | Age: 74
End: 2024-12-03
Payer: MEDICARE

## 2024-12-03 ENCOUNTER — ANTICOAGULATION - WARFARIN VISIT (OUTPATIENT)
Dept: PRIMARY CARE | Facility: CLINIC | Age: 74
End: 2024-12-03
Payer: MEDICARE

## 2024-12-03 DIAGNOSIS — Z95.2 HISTORY OF HEART VALVE REPLACEMENT WITH MECHANICAL VALVE: ICD-10-CM

## 2024-12-03 DIAGNOSIS — E11.9 CONTROLLED TYPE 2 DIABETES MELLITUS WITHOUT COMPLICATION, WITHOUT LONG-TERM CURRENT USE OF INSULIN (MULTI): ICD-10-CM

## 2024-12-03 LAB
POC INR: 2.9
POC PROTHROMBIN TIME: NORMAL

## 2024-12-03 PROCEDURE — 85610 PROTHROMBIN TIME: CPT | Performed by: FAMILY MEDICINE

## 2024-12-03 RX ORDER — LANCETS 33 GAUGE
EACH MISCELLANEOUS
Qty: 100 EACH | Refills: 2 | Status: SHIPPED | OUTPATIENT
Start: 2024-12-03

## 2024-12-03 RX ORDER — BLOOD SUGAR DIAGNOSTIC
STRIP MISCELLANEOUS
Qty: 100 STRIP | Refills: 2 | Status: SHIPPED | OUTPATIENT
Start: 2024-12-03

## 2024-12-06 NOTE — PROGRESS NOTES
Subjective   Patient ID: Shabbir Laurent is a 74 y.o. male who presents for Error (VOID this visit).  HPI    Review of Systems    Objective   Physical Exam    Assessment/Plan            Jean Polanco DO 12/06/24 7:46 AM

## 2024-12-17 ENCOUNTER — OFFICE VISIT (OUTPATIENT)
Dept: PAIN MEDICINE | Facility: CLINIC | Age: 74
End: 2024-12-17
Payer: MEDICARE

## 2024-12-17 VITALS — DIASTOLIC BLOOD PRESSURE: 78 MMHG | RESPIRATION RATE: 20 BRPM | SYSTOLIC BLOOD PRESSURE: 166 MMHG | HEART RATE: 81 BPM

## 2024-12-17 DIAGNOSIS — M48.062 NEUROGENIC CLAUDICATION DUE TO LUMBAR SPINAL STENOSIS: Primary | ICD-10-CM

## 2024-12-17 DIAGNOSIS — M16.11 ARTHRITIS OF RIGHT HIP: ICD-10-CM

## 2024-12-17 DIAGNOSIS — M51.16 DISPLACEMENT OF LUMBAR DISC WITH RADICULOPATHY: ICD-10-CM

## 2024-12-17 PROCEDURE — 3077F SYST BP >= 140 MM HG: CPT | Performed by: NURSE PRACTITIONER

## 2024-12-17 PROCEDURE — 99214 OFFICE O/P EST MOD 30 MIN: CPT | Performed by: NURSE PRACTITIONER

## 2024-12-17 PROCEDURE — 3078F DIAST BP <80 MM HG: CPT | Performed by: NURSE PRACTITIONER

## 2024-12-17 PROCEDURE — 1160F RVW MEDS BY RX/DR IN RCRD: CPT | Performed by: NURSE PRACTITIONER

## 2024-12-17 PROCEDURE — 1123F ACP DISCUSS/DSCN MKR DOCD: CPT | Performed by: NURSE PRACTITIONER

## 2024-12-17 PROCEDURE — 1159F MED LIST DOCD IN RCRD: CPT | Performed by: NURSE PRACTITIONER

## 2024-12-17 PROCEDURE — 4010F ACE/ARB THERAPY RXD/TAKEN: CPT | Performed by: NURSE PRACTITIONER

## 2024-12-17 PROCEDURE — 3048F LDL-C <100 MG/DL: CPT | Performed by: NURSE PRACTITIONER

## 2024-12-17 PROCEDURE — 1125F AMNT PAIN NOTED PAIN PRSNT: CPT | Performed by: NURSE PRACTITIONER

## 2024-12-17 PROCEDURE — 1036F TOBACCO NON-USER: CPT | Performed by: NURSE PRACTITIONER

## 2024-12-17 RX ORDER — HYDROCODONE BITARTRATE AND ACETAMINOPHEN 5; 325 MG/1; MG/1
1 TABLET ORAL EVERY 6 HOURS PRN
Qty: 84 TABLET | Refills: 0 | Status: SHIPPED | OUTPATIENT
Start: 2025-01-14 | End: 2025-02-11

## 2024-12-17 RX ORDER — HYDROCODONE BITARTRATE AND ACETAMINOPHEN 5; 325 MG/1; MG/1
1 TABLET ORAL EVERY 6 HOURS PRN
Qty: 84 TABLET | Refills: 0 | Status: SHIPPED | OUTPATIENT
Start: 2025-02-11 | End: 2025-03-11

## 2024-12-17 RX ORDER — HYDROCODONE BITARTRATE AND ACETAMINOPHEN 5; 325 MG/1; MG/1
1 TABLET ORAL EVERY 6 HOURS PRN
Qty: 84 TABLET | Refills: 0 | Status: SHIPPED | OUTPATIENT
Start: 2024-12-17 | End: 2025-01-14

## 2024-12-17 ASSESSMENT — ENCOUNTER SYMPTOMS
FACIAL ASYMMETRY: 0
ARTHRALGIAS: 1
WEAKNESS: 1
ALLERGIC/IMMUNOLOGIC NEGATIVE: 1
HEMATOLOGIC/LYMPHATIC NEGATIVE: 1
RESPIRATORY NEGATIVE: 1
SEIZURES: 0
GASTROINTESTINAL NEGATIVE: 1
SPEECH DIFFICULTY: 0
NECK STIFFNESS: 0
JOINT SWELLING: 0
CARDIOVASCULAR NEGATIVE: 1
BACK PAIN: 1
HEADACHES: 0
EYES NEGATIVE: 1
NUMBNESS: 1
PSYCHIATRIC NEGATIVE: 1
DIZZINESS: 0
CONSTITUTIONAL NEGATIVE: 1
NECK PAIN: 0
TREMORS: 0
ENDOCRINE NEGATIVE: 1
MYALGIAS: 1
LIGHT-HEADEDNESS: 0

## 2024-12-17 ASSESSMENT — PAIN SCALES - GENERAL: PAINLEVEL_OUTOF10: 7

## 2024-12-17 ASSESSMENT — PAIN DESCRIPTION - DESCRIPTORS: DESCRIPTORS: ACHING;RADIATING;NUMBNESS

## 2024-12-17 ASSESSMENT — PAIN - FUNCTIONAL ASSESSMENT: PAIN_FUNCTIONAL_ASSESSMENT: 0-10
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
## 2024-12-18 DIAGNOSIS — Z95.2 MECHANICAL HEART VALVE PRESENT: ICD-10-CM

## 2024-12-18 RX ORDER — ENOXAPARIN SODIUM 150 MG/ML
120 INJECTION SUBCUTANEOUS EVERY 12 HOURS
Qty: 20 EACH | Refills: 0 | Status: SHIPPED | OUTPATIENT
Start: 2024-12-18

## 2025-01-02 ENCOUNTER — APPOINTMENT (OUTPATIENT)
Dept: PRIMARY CARE | Facility: CLINIC | Age: 75
End: 2025-01-02
Payer: COMMERCIAL

## 2025-01-02 DIAGNOSIS — Z95.2 HISTORY OF HEART VALVE REPLACEMENT WITH MECHANICAL VALVE: ICD-10-CM

## 2025-01-02 LAB
POC INR: 2.9 (ref 2.5–3.5)
POC PROTHROMBIN TIME: 35

## 2025-01-02 PROCEDURE — 85610 PROTHROMBIN TIME: CPT | Performed by: FAMILY MEDICINE

## 2025-01-08 ENCOUNTER — OFFICE VISIT (OUTPATIENT)
Dept: SLEEP MEDICINE | Facility: CLINIC | Age: 75
End: 2025-01-08
Payer: MEDICARE

## 2025-01-08 VITALS
BODY MASS INDEX: 37.24 KG/M2 | SYSTOLIC BLOOD PRESSURE: 126 MMHG | OXYGEN SATURATION: 95 % | DIASTOLIC BLOOD PRESSURE: 76 MMHG | HEART RATE: 54 BPM | WEIGHT: 274.6 LBS | RESPIRATION RATE: 16 BRPM

## 2025-01-08 DIAGNOSIS — G47.33 OSA ON CPAP: Primary | ICD-10-CM

## 2025-01-08 PROCEDURE — 99214 OFFICE O/P EST MOD 30 MIN: CPT | Performed by: INTERNAL MEDICINE

## 2025-01-08 PROCEDURE — 1123F ACP DISCUSS/DSCN MKR DOCD: CPT | Performed by: INTERNAL MEDICINE

## 2025-01-08 PROCEDURE — 1160F RVW MEDS BY RX/DR IN RCRD: CPT | Performed by: INTERNAL MEDICINE

## 2025-01-08 PROCEDURE — 3074F SYST BP LT 130 MM HG: CPT | Performed by: INTERNAL MEDICINE

## 2025-01-08 PROCEDURE — 1159F MED LIST DOCD IN RCRD: CPT | Performed by: INTERNAL MEDICINE

## 2025-01-08 PROCEDURE — 3078F DIAST BP <80 MM HG: CPT | Performed by: INTERNAL MEDICINE

## 2025-01-08 PROCEDURE — 4010F ACE/ARB THERAPY RXD/TAKEN: CPT | Performed by: INTERNAL MEDICINE

## 2025-01-08 PROCEDURE — G2211 COMPLEX E/M VISIT ADD ON: HCPCS | Performed by: INTERNAL MEDICINE

## 2025-01-08 PROCEDURE — 1125F AMNT PAIN NOTED PAIN PRSNT: CPT | Performed by: INTERNAL MEDICINE

## 2025-01-08 ASSESSMENT — PATIENT HEALTH QUESTIONNAIRE - PHQ9
SUM OF ALL RESPONSES TO PHQ9 QUESTIONS 1 AND 2: 0
1. LITTLE INTEREST OR PLEASURE IN DOING THINGS: NOT AT ALL
2. FEELING DOWN, DEPRESSED OR HOPELESS: NOT AT ALL

## 2025-01-08 ASSESSMENT — SLEEP AND FATIGUE QUESTIONNAIRES
SITING INACTIVE IN A PUBLIC PLACE LIKE A CLASS ROOM OR A MOVIE THEATER: MODERATE CHANCE OF DOZING
HOW LIKELY ARE YOU TO NOD OFF OR FALL ASLEEP WHEN YOU ARE A PASSENGER IN A CAR FOR AN HOUR WITHOUT A BREAK: WOULD NEVER DOZE
ESS-CHAD TOTAL SCORE: 11
HOW LIKELY ARE YOU TO NOD OFF OR FALL ASLEEP WHILE SITTING AND READING: MODERATE CHANCE OF DOZING
HOW LIKELY ARE YOU TO NOD OFF OR FALL ASLEEP WHILE WATCHING TV: MODERATE CHANCE OF DOZING
HOW LIKELY ARE YOU TO NOD OFF OR FALL ASLEEP IN A CAR, WHILE STOPPED FOR A FEW MINUTES IN TRAFFIC: WOULD NEVER DOZE
HOW LIKELY ARE YOU TO NOD OFF OR FALL ASLEEP WHILE LYING DOWN TO REST IN THE AFTERNOON WHEN CIRCUMSTANCES PERMIT: MODERATE CHANCE OF DOZING
HOW LIKELY ARE YOU TO NOD OFF OR FALL ASLEEP WHILE SITTING AND TALKING TO SOMEONE: SLIGHT CHANCE OF DOZING
HOW LIKELY ARE YOU TO NOD OFF OR FALL ASLEEP WHILE SITTING QUIETLY AFTER LUNCH WITHOUT ALCOHOL: MODERATE CHANCE OF DOZING

## 2025-01-08 ASSESSMENT — PAIN SCALES - GENERAL: PAINLEVEL_OUTOF10: 5

## 2025-01-08 NOTE — PROGRESS NOTES
[facility] SLEEP MEDICINE   FOLLOW-UP VISIT NOTE      PCP: Jean Polanco DO    HISTORY OF PRESENT ILLNESS     Patient ID: Shabbir Laurent is a 74 y.o. male who presents for follow-up of AUBREY on PAP, yearly checkup.     Patient is here accompanied by wife who adds to history.  To review, patient's medical history is notable for obesity (BMI 37), HTN, CAD, s/p AVR, TIA, allergic rhinitis, hypothyroidism, inflammatory polyarthropathy, and AUBREY on CPAP.      Interval History  Patient was last seen in 1/10/2024. Plan was to continue PAP and follow-up yearly.  Since last visit, patient has been using machine every night. Current mask interface being used is F[employer]P Rasheeda full face mask. Per records, patient is getting supplies thru [employer]  Patient denies machine problems, perceived mask leak, air hunger, aerophagia, skin irritation, and nasal congestion.   Complains of dry mouth.  The following are patient's perceived benefits of PAP: {PAP benefits:82855}    RLS Follow-up:   ***    SLEEP STUDY HISTORY (personally reviewed raw data such as interpretation report, data sheet, hypnogram, and titration table if available and applicable)  split night PSG 5/21/1999: AHI 38, SpO2 loly 81%. CPAP was started at 5-11 cm H2O. RDI was 0 and SpO2>90% on all pressures tested. AUBREY seemed controlled at CPAP 9 cm H2O.   PAP titration 12/11/2009: CPAP was started at 4-17 cm H2O. RDI 4.3, REM RDI 3.1, SpO2 loly 89%. AUBREY appeared well-controlled at CPAP 17 cm H2O    SLEEP-WAKE SCHEDULE  Bedtime:  10 PM to 11:30 PM daily  Subjective sleep latency: 30 minutes  Number of awakenings:  1-5 times per night due to pain. Per patient, without CPAP, he wakes up more frequently  Nocturia: No  Falls back asleep in 5-30 minutes  Final wake time:  6:30 AM daily  Out of bed time:  6:30 AM daily  Shift work: No   Naps: once daily for 30 minutes  Feels rested after a nap: No (not using CPAP during naps)  Average sleep duration (excluding naps): 6-7  hours    SLEEP ENVIRONMENT  Sleep location: bed  Sleep status: sleeps with wife  Preferred sleep position: left side    SOCIAL HISTORY  Smoking: no  ETOH: no  Marijuana: no  Caffeine: 2 cups coffee daily in the morning  Sleep aids: yes on melatonin    WEIGHT: gained 10 lbs in 2 months     Claustrophobia: No     REVIEW OF SYSTEMS     All other systems have been reviewed and are negative.    ALLERGIES     Allergies   Allergen Reactions   • Hydrochlorothiazide Other     BP drops too low and passes out   • Phenylpropanolamine Hives     Patient states he doesn't know what this is   • Amiloride-Hydrochlorothiazide Unknown   • Ceramide 3b Unknown     Patient states he doesn't know what this is   • Chlorpheniramine-Phenylpropan Unknown     Patient states he doesn't know what this is       MEDICATIONS     Current Outpatient Medications   Medication Sig Dispense Refill   • blood glucose control, normal solution Use as directed     • blood sugar diagnostic (OneTouch Ultra Test) strip use to test blood glucose once daily 100 strip 2   • cetirizine (ZYRTEC) 10 mg capsule Take 1 capsule (10 mg) by mouth once daily in the morning.     • cholecalciferol (Vitamin D-3) 50 mcg (2,000 unit) capsule Take 1 capsule (50 mcg) by mouth early in the morning..     • clopidogrel (Plavix) 75 mg tablet Take 1 tablet (75 mg) by mouth once daily. 30 tablet 11   • enoxaparin (Lovenox) 120 mg/0.8 mL syringe Inject 0.8 mL (120 mg) under the skin every 12 hours. 20 each 0   • FLUoxetine (PROzac) 20 mg capsule Take 1 capsule (20 mg) by mouth once daily. 90 capsule 3   • fluticasone propion-salmeteroL (Advair Diskus) 250-50 mcg/dose diskus inhaler Inhale 1 puff 2 times a day. During summer (grass cutting) 180 each 3   • folic acid (Folvite) 1 mg tablet Take 1 tablet (1 mg) by mouth once daily in the morning. 90 tablet 3   • furosemide (Lasix) 20 mg tablet Take 1 tablet (20 mg) by mouth 4 times a week. Take every Mon, Wed, Fri, and Sat     •  HYDROcodone-acetaminophen (Norco) 5-325 mg tablet Take 1 tablet by mouth every 6 hours if needed for severe pain (7 - 10) for up to 28 days. 84 tablet 0   • [START ON 1/14/2025] HYDROcodone-acetaminophen (Norco) 5-325 mg tablet Take 1 tablet by mouth every 6 hours if needed for severe pain (7 - 10) for up to 28 days. Do not fill before January 14, 2025. 84 tablet 0   • [START ON 2/11/2025] HYDROcodone-acetaminophen (Norco) 5-325 mg tablet Take 1 tablet by mouth every 6 hours if needed for severe pain (7 - 10) for up to 28 days. Do not fill before February 11, 2025. 84 tablet 0   • Jardiance 25 mg TAKE 1 TABLET BY MOUTH DAILY 90 tablet 3   • krill-om3-dha-epa-om6-lip-astx (Krill Oil, Omega 3 and 6,) 1,500-165-67.5 mg capsule Take 1 capsule by mouth once daily.     • lancets (OneTouch Delica Plus Lancet) 33 gauge misc USE 1 LANCET PER CUTANEOUS ROUTE TO TEST BLOOD SUGAR ONCE DAILY 100 each 2   • leflunomide (Arava) 20 mg tablet Take 1 tablet (20 mg) by mouth once daily. 90 tablet 3   • levothyroxine (Synthroid, Levoxyl) 137 mcg tablet TAKE 1 TABLET BY MOUTH ONCE  DAILY 90 tablet 3   • losartan (Cozaar) 100 mg tablet TAKE 1 TABLET BY MOUTH ONCE  DAILY 90 tablet 3   • metFORMIN (Glucophage) 850 mg tablet TAKE 1 TABLET BY MOUTH TWICE  DAILY WITH MEALS 180 tablet 3   • metoprolol tartrate (Lopressor) 25 mg tablet Take 1 tablet (25 mg) by mouth once daily. 90 tablet 3   • multivit-min/FA/lycopen/lutein (CENTRUM SILVER MEN ORAL) Take 1 tablet by mouth once daily.     • naloxone (Narcan) 4 mg/0.1 mL nasal spray Administer 1 spray (4 mg) into affected nostril(s) if needed for opioid reversal or respiratory depression. May repeat every 2-3 minutes if needed, alternating nostrils, until medical assistance becomes available. 2 each 0   • nitroglycerin (Nitrostat) 0.4 mg SL tablet Place 1 tablet (0.4 mg) under the tongue every 5 minutes if needed for chest pain. 20 tablet 1   • pregabalin (Lyrica) 200 mg capsule Take 1 capsule  (200 mg) by mouth once daily at bedtime. 90 capsule 3   • rosuvastatin (Crestor) 20 mg tablet Take 1 tablet (20 mg) by mouth once daily. 90 tablet 3   • warfarin (Coumadin) 5 mg tablet Take 1.5 tablets (7.5 mg) by mouth 5 times a week. On Sunday, Monday, Wednesday, Thursday, Saturday evenings     • warfarin (Coumadin) 5 mg tablet TAKE 1 AND 1/2 TABLETS BY MOUTH  5 TIMES WEEKLY AND 1 TABLET BY  MOUTH TWICE WEEKLY OR AS  DIRECTED AFTER INR TESTING. 124 tablet 3   • oxyCODONE-acetaminophen (Percocet) 5-325 mg tablet Take 1 tablet by mouth 3 times a day as needed for severe pain (7 - 10) for up to 28 days. 84 tablet 0   • oxyCODONE-acetaminophen (Percocet) 5-325 mg tablet Take 1 tablet by mouth 3 times a day as needed for severe pain (7 - 10) for up to 28 days. Do not fill before October 24, 2024. 84 tablet 0   • oxyCODONE-acetaminophen (Percocet) 5-325 mg tablet Take 1 tablet by mouth 3 times a day as needed for severe pain (7 - 10) for up to 28 days. Do not fill before November 21, 2024. 84 tablet 0     No current facility-administered medications for this visit.       Active Problems, Allergy List, Medication List, and PMH/PSH/FH/Social Hx have been reviewed and reconciled in chart. No significant changes unless documented in the pertinent chart section. Updates made when necessary.       PHYSICAL EXAM     VITAL SIGNS: /76   Pulse 54   Resp 16   Wt 125 kg (274 lb 9.6 oz)   SpO2 95%   BMI 37.24 kg/m²     NECK CIRCUMFERENCE: *** inches    Today ESS: 11  Last visit ESS: ***  Last visit ANDIE: ***    Physical Exam  Constitutional: Awake, not in distress  Lungs: Clear to auscultation bilateral, no rales  Heart: Regular rate and rhythm, no murmurs  Skin: Warm, no rash  Neuro: No tremors, moves all extremities  Psych: alert and oriented to time, place, and person    RESULTS/DATA     Iron (ug/dL)   Date Value   01/24/2024 71     % Saturation (%)   Date Value   01/24/2024 21 (L)     TIBC (ug/dL)   Date Value    01/24/2024 334     Ferritin (ng/mL)   Date Value   01/24/2024 94       Bicarbonate   Date Value Ref Range Status   11/01/2024 23 21 - 32 mmol/L Final       PAP Adherence  A PAP adherence data was obtained and reviewed personally today in clinic. (see scanned document in EPIC)      ASSESSMENT/PLAN     Shabbir Laurent is a 74 y.o. male who returns in [facility] for the following problems:    OBSTRUCTIVE SLEEP APNEA, *** positional ({EWssbaseline:22349} {EWrespiindices:50492}  SLEEP-RELATED HYPOXEMIA  - Now on auto-CPAP *** cm H2O and EPR ***  - PAP adherence data reviewed today. Usage days ***/30, days> 4 hours at ***%, residual AHI ***.   - Patient reports improvement of AUBREY symptoms.   {EWPAPFUPLANS:63086}  - Continue supportive management as follows: Lose weight. Stay off your back when sleeping. Avoid smoking, alcohol, and sedating medications if applicable. Don't drive when sleepy.    SEASONAL ALLERGIES / ALLERGIC RHINITIS / CHRONIC NASAL CONGESTION / POST-NASAL DRIP  - Start fluticasone nasal spray 2 sprays per nostril once daily 1 hour before bedtime.  - If there is inadequate or lack of improvement on the above intranasal steroid spray, consider adding Azelastine nasal spray, 2 sprays per nostril once daily in the morning.   - Alternatively, may add cetirizine or loratadine 10 mg once daily.     OBESITY / MORBID OBESITY  - Recommend weight loss with diet and exercise.  - Weight loss can help in long term management of AUBREY.  - Defer management to PCP.  - Will do preop risk evaluation once patient comes back with good PAP compliance    HYPERTENSION  - BP stable today.  - BP slightly high today. Denies chest discomfort, shortness of breath, palpitations, headache, blurred vision, dizziness, or neurologic deficit.  - Untreated  or inadequately treated sleep apnea can lead to new onset hypertension, resistant hypertension, or refractory hypertension.  - Continue anti-hypertensive  medications per PCP.  - Supportive management: low salt DASH diet (less than 2000 mg sodium intake daily), moderate intensity aerobic exercise at least 30 minutes 5 days per week, reduce stress, quit smoking, limit alcohol, lose weight, and monitor BP once daily  - PCP following. Defer management to PCP.    ATRIAL FIBRILLATION, ***  - currently in sinus rhythm / rate-controlled***  - Continue meds per Cardio.  - Cardio following. Defer management to Cardio.    {EWFOLLOW-UP:50829}    All of patient's questions were answered. He verbalizes understanding and agreement with my assessment and plan. Refer to after-visit-summary (AVS) for patient education and if applicable, for more details on sleep study preparation, troubleshooting issues with PAP usage, proper cleaning instructions of PAP supplies, and usual recommended replacement schedule for each of the PAP supplies.

## 2025-01-13 ENCOUNTER — TELEPHONE (OUTPATIENT)
Dept: PRIMARY CARE | Facility: CLINIC | Age: 75
End: 2025-01-13
Payer: MEDICARE

## 2025-01-13 DIAGNOSIS — M06.4 INFLAMMATORY POLYARTHROPATHY (MULTI): ICD-10-CM

## 2025-01-13 DIAGNOSIS — M13.0 POLYARTHRITIS: ICD-10-CM

## 2025-01-13 RX ORDER — LEFLUNOMIDE 20 MG/1
20 TABLET ORAL DAILY
Qty: 90 TABLET | Refills: 3 | Status: SHIPPED | OUTPATIENT
Start: 2025-01-13

## 2025-01-14 ENCOUNTER — TELEPHONE (OUTPATIENT)
Dept: PAIN MEDICINE | Facility: CLINIC | Age: 75
End: 2025-01-14
Payer: MEDICARE

## 2025-01-14 NOTE — TELEPHONE ENCOUNTER
Please call patient in regards to injection on 2-6. Has questions about his coumadin, lovenox etc.   Thanks

## 2025-01-15 ENCOUNTER — TELEPHONE (OUTPATIENT)
Dept: PRIMARY CARE | Facility: CLINIC | Age: 75
End: 2025-01-15
Payer: MEDICARE

## 2025-01-15 DIAGNOSIS — E03.9 ACQUIRED HYPOTHYROIDISM: Primary | ICD-10-CM

## 2025-01-15 RX ORDER — LEVOTHYROXINE SODIUM 150 UG/1
150 TABLET ORAL DAILY
Qty: 90 TABLET | Refills: 3 | Status: SHIPPED | OUTPATIENT
Start: 2025-01-15 | End: 2026-01-15

## 2025-01-20 ENCOUNTER — APPOINTMENT (OUTPATIENT)
Dept: RHEUMATOLOGY | Facility: CLINIC | Age: 75
End: 2025-01-20
Payer: COMMERCIAL

## 2025-01-20 ENCOUNTER — LAB (OUTPATIENT)
Dept: LAB | Facility: LAB | Age: 75
End: 2025-01-20
Payer: MEDICARE

## 2025-01-20 VITALS
HEART RATE: 51 BPM | BODY MASS INDEX: 36.84 KG/M2 | WEIGHT: 272 LBS | DIASTOLIC BLOOD PRESSURE: 73 MMHG | OXYGEN SATURATION: 94 % | HEIGHT: 72 IN | SYSTOLIC BLOOD PRESSURE: 147 MMHG

## 2025-01-20 DIAGNOSIS — M06.00 SERONEGATIVE RHEUMATOID ARTHRITIS (MULTI): Primary | ICD-10-CM

## 2025-01-20 DIAGNOSIS — Z79.899 ENCOUNTER FOR LONG-TERM (CURRENT) USE OF MEDICATIONS: ICD-10-CM

## 2025-01-20 DIAGNOSIS — M06.00 SERONEGATIVE RHEUMATOID ARTHRITIS (MULTI): ICD-10-CM

## 2025-01-20 LAB
ALBUMIN SERPL BCP-MCNC: 4.5 G/DL (ref 3.4–5)
ALP SERPL-CCNC: 85 U/L (ref 33–136)
ALT SERPL W P-5'-P-CCNC: 15 U/L (ref 10–52)
ANION GAP SERPL CALC-SCNC: 14 MMOL/L (ref 10–20)
AST SERPL W P-5'-P-CCNC: 21 U/L (ref 9–39)
BILIRUB SERPL-MCNC: 0.6 MG/DL (ref 0–1.2)
BUN SERPL-MCNC: 23 MG/DL (ref 6–23)
CALCIUM SERPL-MCNC: 9.4 MG/DL (ref 8.6–10.3)
CCP IGG SERPL-ACNC: <1 U/ML
CENTROMERE B AB SER-ACNC: <0.2 AI
CHLORIDE SERPL-SCNC: 102 MMOL/L (ref 98–107)
CHROMATIN AB SERPL-ACNC: <0.2 AI
CO2 SERPL-SCNC: 25 MMOL/L (ref 21–32)
CREAT SERPL-MCNC: 1.09 MG/DL (ref 0.5–1.3)
CRP SERPL-MCNC: 0.32 MG/DL
DSDNA AB SER-ACNC: <1 IU/ML
EGFRCR SERPLBLD CKD-EPI 2021: 71 ML/MIN/1.73M*2
ENA JO1 AB SER QL IA: <0.2 AI
ENA RNP AB SER IA-ACNC: 0.3 AI
ENA SCL70 AB SER QL IA: <0.2 AI
ENA SM AB SER IA-ACNC: <0.2 AI
ENA SM+RNP AB SER QL IA: <0.2 AI
ENA SS-A AB SER IA-ACNC: <0.2 AI
ENA SS-B AB SER IA-ACNC: <0.2 AI
ERYTHROCYTE [DISTWIDTH] IN BLOOD BY AUTOMATED COUNT: 14.1 % (ref 11.5–14.5)
ERYTHROCYTE [SEDIMENTATION RATE] IN BLOOD BY WESTERGREN METHOD: 11 MM/H (ref 0–20)
GLUCOSE SERPL-MCNC: 137 MG/DL (ref 74–99)
HBV CORE AB SER QL: NONREACTIVE
HBV SURFACE AG SERPL QL IA: NONREACTIVE
HCT VFR BLD AUTO: 47 % (ref 41–52)
HCV AB SER QL: NONREACTIVE
HGB BLD-MCNC: 15.7 G/DL (ref 13.5–17.5)
MCH RBC QN AUTO: 31 PG (ref 26–34)
MCHC RBC AUTO-ENTMCNC: 33.4 G/DL (ref 32–36)
MCV RBC AUTO: 93 FL (ref 80–100)
NRBC BLD-RTO: 0 /100 WBCS (ref 0–0)
PLATELET # BLD AUTO: 254 X10*3/UL (ref 150–450)
POTASSIUM SERPL-SCNC: 4.2 MMOL/L (ref 3.5–5.3)
PROT SERPL-MCNC: 7.3 G/DL (ref 6.4–8.2)
RBC # BLD AUTO: 5.07 X10*6/UL (ref 4.5–5.9)
RHEUMATOID FACT SER NEPH-ACNC: <10 IU/ML (ref 0–15)
RIBOSOMAL P AB SER-ACNC: <0.2 AI
SODIUM SERPL-SCNC: 137 MMOL/L (ref 136–145)
URATE SERPL-MCNC: 5.4 MG/DL (ref 4–7.5)
WBC # BLD AUTO: 6 X10*3/UL (ref 4.4–11.3)

## 2025-01-20 PROCEDURE — 80053 COMPREHEN METABOLIC PANEL: CPT

## 2025-01-20 PROCEDURE — 3008F BODY MASS INDEX DOCD: CPT | Performed by: INTERNAL MEDICINE

## 2025-01-20 PROCEDURE — 1036F TOBACCO NON-USER: CPT | Performed by: INTERNAL MEDICINE

## 2025-01-20 PROCEDURE — 86704 HEP B CORE ANTIBODY TOTAL: CPT

## 2025-01-20 PROCEDURE — 86200 CCP ANTIBODY: CPT

## 2025-01-20 PROCEDURE — 36415 COLL VENOUS BLD VENIPUNCTURE: CPT

## 2025-01-20 PROCEDURE — 3077F SYST BP >= 140 MM HG: CPT | Performed by: INTERNAL MEDICINE

## 2025-01-20 PROCEDURE — 87340 HEPATITIS B SURFACE AG IA: CPT

## 2025-01-20 PROCEDURE — 1123F ACP DISCUSS/DSCN MKR DOCD: CPT | Performed by: INTERNAL MEDICINE

## 2025-01-20 PROCEDURE — 1125F AMNT PAIN NOTED PAIN PRSNT: CPT | Performed by: INTERNAL MEDICINE

## 2025-01-20 PROCEDURE — 85652 RBC SED RATE AUTOMATED: CPT

## 2025-01-20 PROCEDURE — 86481 TB AG RESPONSE T-CELL SUSP: CPT

## 2025-01-20 PROCEDURE — 3078F DIAST BP <80 MM HG: CPT | Performed by: INTERNAL MEDICINE

## 2025-01-20 PROCEDURE — 86225 DNA ANTIBODY NATIVE: CPT

## 2025-01-20 PROCEDURE — 1159F MED LIST DOCD IN RCRD: CPT | Performed by: INTERNAL MEDICINE

## 2025-01-20 PROCEDURE — 86431 RHEUMATOID FACTOR QUANT: CPT

## 2025-01-20 PROCEDURE — 85027 COMPLETE CBC AUTOMATED: CPT

## 2025-01-20 PROCEDURE — 86235 NUCLEAR ANTIGEN ANTIBODY: CPT

## 2025-01-20 PROCEDURE — 86038 ANTINUCLEAR ANTIBODIES: CPT

## 2025-01-20 PROCEDURE — 86803 HEPATITIS C AB TEST: CPT

## 2025-01-20 PROCEDURE — 99213 OFFICE O/P EST LOW 20 MIN: CPT | Performed by: INTERNAL MEDICINE

## 2025-01-20 PROCEDURE — 86140 C-REACTIVE PROTEIN: CPT

## 2025-01-20 PROCEDURE — 4010F ACE/ARB THERAPY RXD/TAKEN: CPT | Performed by: INTERNAL MEDICINE

## 2025-01-20 PROCEDURE — 84550 ASSAY OF BLOOD/URIC ACID: CPT

## 2025-01-20 PROCEDURE — G2211 COMPLEX E/M VISIT ADD ON: HCPCS | Performed by: INTERNAL MEDICINE

## 2025-01-20 ASSESSMENT — PAIN SCALES - GENERAL: PAINLEVEL_OUTOF10: 5

## 2025-01-20 ASSESSMENT — ENCOUNTER SYMPTOMS
DEPRESSION: 0
ABDOMINAL PAIN: 0
OCCASIONAL FEELINGS OF UNSTEADINESS: 1
LOSS OF SENSATION IN FEET: 1
APNEA: 1
SLEEP DISTURBANCE: 1
FATIGUE: 1

## 2025-01-20 ASSESSMENT — PATIENT HEALTH QUESTIONNAIRE - PHQ9
2. FEELING DOWN, DEPRESSED OR HOPELESS: NOT AT ALL
SUM OF ALL RESPONSES TO PHQ9 QUESTIONS 1 AND 2: 0
1. LITTLE INTEREST OR PLEASURE IN DOING THINGS: NOT AT ALL
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
## 2025-01-21 LAB — ANA SER QL HEP2 SUBST: NEGATIVE

## 2025-01-22 ENCOUNTER — APPOINTMENT (OUTPATIENT)
Dept: PRIMARY CARE | Facility: CLINIC | Age: 75
End: 2025-01-22
Payer: MEDICARE

## 2025-01-22 LAB
NIL(NEG) CONTROL SPOT COUNT: NORMAL
PANEL A SPOT COUNT: 0
PANEL B SPOT COUNT: 0
POS CONTROL SPOT COUNT: NORMAL
T-SPOT. TB INTERPRETATION: NEGATIVE

## 2025-01-23 ENCOUNTER — APPOINTMENT (OUTPATIENT)
Dept: PAIN MEDICINE | Facility: CLINIC | Age: 75
End: 2025-01-23
Payer: MEDICARE

## 2025-01-25 NOTE — PATIENT INSTRUCTIONS
"Thank you for coming to the Sleep Medicine Clinic today! Your sleep medicine provider today was: Roselyn Gamboa MD Below is a summary of your treatment plan, patient education, other important information, and our contact numbers.      TREATMENT PLAN     - Continue current machine settings. Continue using machine every night all night.   - Renew PAP supplies. Order placed and sent to [facility].  - Please read the \"Patient Education\" section below for more detailed information. Try implementing tips, reminders, strategies, and supportive management.   - If not yet done, please sign up for "[employer] to make a future schedule, send prescription requests, or send messages.    Follow-up Appointment:   Follow-up in 1 year.    PATIENT EDUCATION     OBSTRUCTIVE SLEEP APNEA (AUBREY) is a sleep disorder where your upper airway muscles relax during sleep and the airway intermittently and repetitively narrows and collapses leading to partially blocked airway (hypopnea) or completely blocked airway (apnea) which, in turn, can disrupt breathing in sleep, lower oxygen levels while you sleep and cause night time wakings. Because both apnea and hypopnea may cause higher carbon dioxide or low oxygen levels, untreated AUBREY can lead to heart arrhythmia, elevation of blood pressure, and make it harder for the body to consolidate memory and facilitate metabolism (leading to higher blood sugars at night). Frequent partial arousals occur during sleep resulting in sleep deprivation and daytime sleepiness. AUBREY is associated with an increased risk of cardiovascular disease, stroke, hypertension, and insulin resistance. Moreover, untreated AUBREY with excessive daytime sleepiness can increase the risk of motor vehicular accidents.    Below are conservative strategies for AUBREY regardless of AUBREY severity are:   Positional therapy - Avoid sleeping on your back.   Healthy diet and regular exercise to optimize weight is highly encouraged.   Avoid alcohol late " in the evening and sedative-hypnotics as these substances can make sleep apnea worse.   Improve breathing through the nose with intranasal steroid spray, saline rinse, or antihistamines    Safety: Avoid driving vehicle and operating heavy equipment while sleepy. Drowsy driving may lead to life-threatening motor vehicle accidents. A person driving while sleepy is 5 times more likely to have an accident. If you feel sleepy, pull over and take a short power nap (sleep for less than 30 minutes). Otherwise, ask somebody to drive you.    Treatment options for sleep apnea include weight management, positional therapy, Positive Airway Therapy (PAP) therapy, oral appliance therapy, hypoglossal nerve stimulator (Inspire) and select airway surgeries.    Annual Reminders About Your Sleep Apnea    Your sleep apnea is well controlled based on reviewing your PAP Data Report.     General Reminders:  Continue current machine settings. Continue using machine every night. Need at least 4 hours daily usage.   Remember to clean your mask, tubings, and water chamber regularly as instructed.   Know the replacement schedule of your supplies/ accessories and contact your DME (durable medical equipment) provider if you are due for them.   Avoid driving or operating heavy machinery when drowsy. A person driving while sleepy is 5 times more likely to have an accident. If you feel sleepy, pull over and take a short power nap (sleep for less than 30 minutes). Otherwise, ask somebody to drive you.    Follow-up sooner through [employer] [employer] or calling our office if you have any of the following symptoms:  Snoring or stopping breathing while using the machine  Recurrence of fatigue, sleepiness, insomnia, and other symptoms you had prior using machine  Persistent or worsening fatigue or sleepiness despite regular use of machine  Issues tolerating the machine like bloating sensation, air hunger, too much hot air, too much pressure, taking off mask without  recall in the middle of sleep, etc.     Other conservative measures to improve sleep apnea:  Losing weight can lead to some improvement of AUBREY which means you will need lower pressures in machine to control your AUBREY. In some patients, they don't need to use the machine after bariatric surgery. Hence, consider medical and/or surgical weight loss especially if your BMI is more than 35.  Avoiding alcohol, sedative-hypnotics, and sedating medications is imperative as these substances can worsen snoring and sleep apnea  If you have nasal congestion or seasonal allergies, improving your breathing through the nose is critical for treating sleep apnea, tolerating CPAP, and improving your sleep; hence, using intranasal steroid spray like Flonase might help. Make sure you know the proper way to use it.  Stay off your back when sleeping.    Common issues with PAP machine:  Mask gets dislodged when turning to the side: Consider getting a CPAP pillow or switching to a mask with hose on top.     Dry mouth:  Your machine has built-in humidifier that heats up the air to prevent dry mouth. It can be adjusted to your comfort. You can try that first and increase setting one level one night at a time to check which setting is comfortable and effective in lessening dry mouth. In some patients with heated hose, adjusting tube temperature to make air warmer can improve dry mouth. If dry mouth persists despite adjusting humidity or tube temperature setting, may apply OTC Biotene gel over the gums at bedtime.  If Biotene gel is not effective, consider trying XEROSTOM gel from [employer].  Also, eliminate or reduce dose of medications that can cause dry mouth if possible. Lastly, may try getting a separate room humidifier machine.    Airleaks: Please call DME as they may need to adjust your mask or refit you with a different kind or different size of mask. In addition, you can ask DME for tips on getting a good mask seal and mask fit.  "    Difficulty tolerating the mask: Contact your DME to try a different kind of mask and/or call office to get a referral to Sleep Psychologist for CPAP desensitization. CPAP desensitization technique is a set of strategies that helps patient cope with claustrophobia and anxiety related to wearing mask. Alternatively, we can do a daytime mini-sleep study called PAP-nap trial wherein you will try on different kinds of mask and the sleep technician will try different pressure settings on CPAP and BPAP machines to see which specific pressure is tolerable and comfortable for you.     Water droplets or moisture within the hose and/or mask: This is called rain-out and it is caused by condensation of too much heated humidity on the cooler walls of the hose. If you have rain-out, turn down humidity settings or get a heated hose. If you already have a heated hose, turn up the \"tube temperature\" of the heated hose. Alternatively, if you don't want to get a heated hose or warmer air, may wrap the CPAP hose with stockings to keep it somewhat warm. Also, you need to place the machine on the floor and lower the hose so that water won't travel upward towards your mask.     Maintaining your CPAP/BPAP device:    The humidification chamber (aka water tank or water chamber) needs to be filled with distilled water to prevent buildup of white deposits in the future. If you cannot find distilled water, you can use tap water but expect to have white deposits buildup seen after prolonged use with tap water. If you start seeing white deposits on the water chamber, you can clean it by filling it with equal parts of distilled white vinegar and water. Let the vinegar-water mixture sit for 2 hours, and then rinse it with running tap water. Clean with soap and water then let it dry.     You should try to keep your machine clean in order to work well. Here are some tips to clean PAP supplies / accessories:    Clean the humidification chamber (aka " water tank) as well as your mask and tubing at least once a week with soap and water.   Alternatively, you can fill a sink or basin with warm water and add a little mild detergent, like Ivory dish soap. Gently wipe your supplies with the soapy water to free all the oils and dirt that may have collected. Once that's done, rinse these items with clean water until the soap is gone and let them air dry. You can hang your tubing over the curtain amelia in your bathroom so that it dries.  The mask insert (part of the mask that has contact with your skin) needs to be cleaned with soap and water daily. Another option is to wipe them down with CPAP wipes or baby wipes.    You should replace your mask and tubing frequently in order to prevent bacteria buildup, machine damage, and mask seal issues. The older the mask and hose, the high likelihood that there is bacteria buildup in it especially if they are not cleaned regularly. Dirty filters damage machines because build-up of dust and contaminants can cause machine to over-heat, and in time, damage the motor of machine. Cushions lose their seal over time as most masks are made of plastic and silicone while headgear is made of neoprene. These materials will break down with age and frequent use. Here is the recommended replacement schedule for PAP supplies / accessories:    Twice a month- disposable filters and cushions for nasal mask or nasal pillows.  Once a month- cushion for full face mask  Every 3 months- mask with headgear and PAP tubing (standard or heated hose)  Every 6 months- reusable filter, water chamber, and chin strap     Other useful information:    Some people do not put water in the tank while other people prefers to put water in the tank to prevent mouth dryness. Try to experiment to determine which is more comfortable for you.   In general, new machines have 2 years warranty on parts while health insurance allows you to have a new machine once every 5 years.      You can also go to the following EDUCATION WEBSITES for further information:   American Academy of Sleep Medicine http://sleepeducation.org  National Sleep Foundation: https://sleepfoundation.org  American Sleep Apnea Association: https://www.sleepapnea.org (for patients with sleep apnea)  Narcolepsy Network: https://www.narcolepsynetwork.org (for patients with narcolepsy)  [employer]: https://www.Cloudarycolepsy.org (for patients with narcolepsy)  Hypersomnia Foundation: https://www.hypersomniafoundation.org (for patients with idiopathic hypersomnia)  RLS foundation: https://www.rls.org (for patients with restless leg syndrome)    IMPORTANT INFORMATION     Call 911 for medical emergencies.  Our offices are generally open from Monday-Friday, 8 am - 5 pm.   There are no supporting services by either the sleep doctors or their staff on weekends and Holidays, or after 5 PM on weekdays.   If you need to get in touch with me, you may either call my office number or you can use [employer].  If a referral for a test, for CPAP, or for another specialist was made, and you have not heard about scheduling this within a week, please call scheduling at [phone] ([phone]).  If you are unable to make your appointment for clinic or an overnight study, kindly call the office or sleep testing center at least 48 hours in advance to cancel and reschedule.  If you are on CPAP, please bring your device's card and/or the device to each clinic appointment.   In case of problems with PAP machine or mask interface, please contact your [employer] (Durable Medical Equipment) company first. [employer] is the company who provides you the machine and/or PAP supplies.       PRESCRIPTIONS     We require 7 days advanced notice for prescription refills. If we do not receive the request in this time, we cannot guarantee that your medication will be refilled in time.    IMPORTANT PHONE NUMBERS     Sleep Medicine Clinic Fax: [phone]  Appointments  (for Pediatric Sleep Clinic): [phone] - option 1  Appointments (for Adult Sleep Clinic): [phone] - option 2  Appointments (For Sleep Studies): [phone] - option 3  Behavioral Sleep Medicine: [phone]  Sleep Surgery: [phone]  Nutrition Service: [phone]  Weight management clinics with endocrinology: [phone]  Bariatric Services: [phone] (includes weight loss medications and weight loss surgery)  [facility]: [phone] (offers holistic approaches to weight management)  ENT (Otolaryngology): [phone]  Headache Clinic (Neurology): [phone]  Neurology: [phone]  Psychiatry: [phone]  Pulmonary Function Testing (PFT) Center: [phone]  Pulmonary Medicine: [phone]  [employer] ([place]): [phone]      OUR SLEEP TESTING LOCATIONS     Our team will contact you to schedule your sleep study, however, you can contact us as follow:  Main Phone Line (scheduling only): [phone]), option 3  Adult and Pediatric Locations  [facility] (6 years and older): [facility] - 4th floor ([address] [place] [address]) After hours line: [phone]  [facility] (Main campus: All ages): [facility], 6th floor. After hours line: [phone]   [place] (5 years and older; younger considered on case-by-case basis): [address]; [address], [address]. Scheduling  After hours line: [phone]   [place] (6 years and older): [address]; [address]; [address]   [place] (6 years and older): [address] [facility] [address]  After hours line: [phone]   [place] (13 years and older) in [place]: [address] [facility] [address], 2nd floor  After hours line: [phone]   [place] (13 year and older): [address]  After hours line: [phone]     Adult Only Locations:  [place] (18 years and older): [address] [place] [address],  "2nd floor   [place] (18 years and older): [address]; 4th floor  After hours line: [phone]   [place] (18 years and older) at [place]: [address] [facility]  After hours line: [phone]     CONTACTING YOUR SLEEP MEDICINE PROVIDER AND SLEEP TEAM      For issues with your machine or mask interface, please call your DME provider first. DME stands for durable medical company. DME is the company who provides you the machine and/or PAP supplies / accessories.   To schedule, cancel, or reschedule SLEEP STUDY APPOINTMENTS, please call the Main Phone Line at [phone] - option 3.   To schedule, cancel, or reschedule CLINIC APPOINTMENTS, you can do it in \"[employer]\", call [phone] (direct line of [place] clinic), call [phone] (direct line of [facility]), or call the Main Phone Line at [phone] - option 2  For CLINICAL QUESTIONS or MEDICATION REFILLS, please call direct line for Adult Sleep Nurses at [phone].   Lastly, you can also send a message directly to your provider through \"My Chart\", which is the email service through your  Records Account: [email or web address]       Here at [facility], we wish you a restful sleep!    "

## 2025-01-29 ENCOUNTER — APPOINTMENT (OUTPATIENT)
Dept: PRIMARY CARE | Facility: CLINIC | Age: 75
End: 2025-01-29
Payer: MEDICARE

## 2025-02-05 ENCOUNTER — PREP FOR PROCEDURE (OUTPATIENT)
Dept: PAIN MEDICINE | Facility: CLINIC | Age: 75
End: 2025-02-05

## 2025-02-05 ENCOUNTER — APPOINTMENT (OUTPATIENT)
Dept: PRIMARY CARE | Facility: CLINIC | Age: 75
End: 2025-02-05
Payer: MEDICARE

## 2025-02-05 DIAGNOSIS — Z95.2 MECHANICAL HEART VALVE PRESENT: ICD-10-CM

## 2025-02-05 DIAGNOSIS — I21.4 NSTEMI (NON-ST ELEVATED MYOCARDIAL INFARCTION) (MULTI): ICD-10-CM

## 2025-02-05 DIAGNOSIS — I95.89: ICD-10-CM

## 2025-02-05 LAB
POC INR: 1.3
POC PROTHROMBIN TIME: NORMAL

## 2025-02-05 PROCEDURE — 85610 PROTHROMBIN TIME: CPT | Performed by: FAMILY MEDICINE

## 2025-02-05 RX ORDER — METOPROLOL TARTRATE 25 MG/1
25 TABLET, FILM COATED ORAL DAILY
Qty: 90 TABLET | Refills: 3 | Status: CANCELLED | OUTPATIENT
Start: 2025-02-05

## 2025-02-05 NOTE — PROGRESS NOTES
Pt is having spinal block Feb 6, per Dr Polanco, check level 1 wk after he restarts his coumadin. Pt given copy-SM

## 2025-02-06 ENCOUNTER — HOSPITAL ENCOUNTER (OUTPATIENT)
Dept: PAIN MEDICINE | Facility: CLINIC | Age: 75
Discharge: HOME | End: 2025-02-06
Payer: MEDICARE

## 2025-02-06 VITALS
OXYGEN SATURATION: 96 % | DIASTOLIC BLOOD PRESSURE: 79 MMHG | RESPIRATION RATE: 16 BRPM | SYSTOLIC BLOOD PRESSURE: 152 MMHG | HEART RATE: 73 BPM

## 2025-02-06 DIAGNOSIS — M48.062 NEUROGENIC CLAUDICATION DUE TO LUMBAR SPINAL STENOSIS: ICD-10-CM

## 2025-02-06 DIAGNOSIS — I95.89: ICD-10-CM

## 2025-02-06 DIAGNOSIS — Z95.2 MECHANICAL HEART VALVE PRESENT: ICD-10-CM

## 2025-02-06 DIAGNOSIS — I21.4 NSTEMI (NON-ST ELEVATED MYOCARDIAL INFARCTION) (MULTI): ICD-10-CM

## 2025-02-06 PROCEDURE — 2550000001 HC RX 255 CONTRASTS: Performed by: ANESTHESIOLOGY

## 2025-02-06 PROCEDURE — 2500000004 HC RX 250 GENERAL PHARMACY W/ HCPCS (ALT 636 FOR OP/ED): Performed by: ANESTHESIOLOGY

## 2025-02-06 PROCEDURE — 64483 NJX AA&/STRD TFRM EPI L/S 1: CPT | Performed by: ANESTHESIOLOGY

## 2025-02-06 PROCEDURE — 64483 NJX AA&/STRD TFRM EPI L/S 1: CPT | Mod: 50 | Performed by: ANESTHESIOLOGY

## 2025-02-06 RX ORDER — LIDOCAINE HYDROCHLORIDE 5 MG/ML
INJECTION, SOLUTION INFILTRATION; INTRAVENOUS AS NEEDED
Status: COMPLETED | OUTPATIENT
Start: 2025-02-06 | End: 2025-02-06

## 2025-02-06 RX ORDER — DEXAMETHASONE SODIUM PHOSPHATE 10 MG/ML
INJECTION INTRAMUSCULAR; INTRAVENOUS AS NEEDED
Status: COMPLETED | OUTPATIENT
Start: 2025-02-06 | End: 2025-02-06

## 2025-02-06 RX ORDER — METOPROLOL TARTRATE 25 MG/1
25 TABLET, FILM COATED ORAL DAILY
Qty: 90 TABLET | Refills: 3 | Status: SHIPPED | OUTPATIENT
Start: 2025-02-06 | End: 2025-02-07 | Stop reason: SDUPTHER

## 2025-02-06 RX ORDER — INDOMETHACIN 25 MG/1
CAPSULE ORAL AS NEEDED
Status: COMPLETED | OUTPATIENT
Start: 2025-02-06 | End: 2025-02-06

## 2025-02-06 RX ADMIN — LIDOCAINE HYDROCHLORIDE 11 ML: 5 INJECTION, SOLUTION INFILTRATION at 14:02

## 2025-02-06 RX ADMIN — SODIUM BICARBONATE 1 MEQ: 84 INJECTION, SOLUTION INTRAVENOUS at 14:02

## 2025-02-06 RX ADMIN — DEXAMETHASONE SODIUM PHOSPHATE 10 MG: 10 INJECTION, SOLUTION INTRAMUSCULAR; INTRAVENOUS at 14:03

## 2025-02-06 RX ADMIN — IOHEXOL 5 ML: 240 INJECTION, SOLUTION INTRATHECAL; INTRAVASCULAR; INTRAVENOUS; ORAL at 14:02

## 2025-02-06 ASSESSMENT — PAIN SCALES - GENERAL: PAINLEVEL_OUTOF10: 7

## 2025-02-06 ASSESSMENT — PAIN DESCRIPTION - DESCRIPTORS: DESCRIPTORS: ACHING;RADIATING

## 2025-02-06 ASSESSMENT — ENCOUNTER SYMPTOMS
DEPRESSION: 0
LOSS OF SENSATION IN FEET: 0
OCCASIONAL FEELINGS OF UNSTEADINESS: 0

## 2025-02-06 ASSESSMENT — PAIN - FUNCTIONAL ASSESSMENT: PAIN_FUNCTIONAL_ASSESSMENT: 0-10

## 2025-02-06 NOTE — H&P
HISTORY AND PHYSICAL UPDATE FOR PROCEDURE    History Of Present Illness  Shabbir Laurent is a 74 y.o. male presenting with chronic lower back and leg pain d/t lumbar radiculitis.  Here for Bilateral lumbar transforaminal epidural steroid injections, likely L3    This note is intended to be an update to the H&P from the last office consult note. Please refer to the last pain management consult note for full H&P.    he denies any recent antibiotic use or infections, he last took WARFARIN 6 days ago and lovenox for 24 hrs and INR done yesterday 1.3, and he denies contrast or local anesthetic allergies     Pre-sedation evaluation:  ASA Classification (bolded):   ASA I: Healthy patient, non-smoking, no none or minimal alcohol use  ASA II: Patient with mild systemic disease, without substantiative functional limitations.  Current smoker, social alcohol drinker, pregnancy, obesity (BMI 30-40)DM/HTN,, well-controlled mild lung disease  ASA III: Patient with severe systemic disease; substantiative of functional limitation; One or more moderate to severe diseases: Poorly controlled DM/HTN, COPD, morbid obesity (BMI>40), active hepatitis, alcohol abuse/dependence, implanted pacemaker, moderate reduction of ejection fraction, ESRD on dialysis, history (>3months) of MI, CVA, TIA or CAD/stents  ASA IV: Patient with severe systemic disease that is a constant threat to life; recent (<3 months) MI, CVA, TIA or CAD/stents, ongoing cardiac ischemia or severe valvular dysfunction, severely reduced ejection fraction, shock, sepsis, DIC, ESRD not undergoing regular scheduled dialysis    Mallampati score (bolded):   Class I: Complete visualization of the soft palate  Class II: Complete visualization of the uvula  Class III: Visualization of only the base of the uvula  Class IV: Soft palate is not visible at all    Past Medical History  Past Medical History:   Diagnosis Date    Abrasion, right lower leg, initial encounter 09/26/2019    Leg  abrasion, right, initial encounter    Body mass index (BMI) 37.0-37.9, adult 02/28/2020    BMI 37.0-37.9, adult    Closed fracture of lower end of right radius with routine healing 03/03/2023    Corns and callosities 03/22/2019    Callus of foot    Coronary artery disease     Diverticulosis of intestine, part unspecified, without perforation or abscess without bleeding     Diverticulosis    Hypertension     Other conditions influencing health status 08/30/2016    Bleeding from varicose vein, right    Other forms of angina pectoris     Chronic stable angina    Other hyperlipidemia     Other hyperlipidemia    Other postherpetic nervous system involvement 10/07/2015    Herpes zoster virus infection of face and ear nerves    Pain in right ankle and joints of right foot 01/15/2020    Acute right ankle pain    Personal history of colonic polyps     History of colonic polyps    Personal history of diseases of the skin and subcutaneous tissue 09/29/2016    History of folliculitis    Personal history of other (healed) physical injury and trauma 08/11/2014    History of sprain of elbow    Personal history of other diseases of the circulatory system     History of aortic valve stenosis    Personal history of other diseases of the musculoskeletal system and connective tissue     History of fibromyalgia    Personal history of other diseases of the musculoskeletal system and connective tissue 07/06/2013    History of low back pain    Personal history of other diseases of the nervous system and sense organs 10/28/2013    History of subconjunctival hemorrhage    Personal history of other diseases of the respiratory system 03/04/2019    History of acute bronchitis    Personal history of other endocrine, nutritional and metabolic disease     History of obesity    Personal history of other endocrine, nutritional and metabolic disease 05/02/2019    History of type 2 diabetes mellitus    Personal history of other endocrine, nutritional  and metabolic disease 05/10/2018    History of hyperglycemia    Personal history of other mental and behavioral disorders 04/07/2020    History of depression    Personal history of other specified conditions     History of shortness of breath    Personal history of other specified conditions     History of palpitations    Prediabetes 10/04/2018    Pre-diabetes    Presence of aortocoronary bypass graft     S/P CABG x 1    Presence of prosthetic heart valve     Status post mechanical aortic valve replacement    Rash and other nonspecific skin eruption 01/10/2017    Rash, skin    Spondylosis without myelopathy or radiculopathy, lumbar region 07/06/2013    Lumbar spondylosis    Strain of unspecified muscles, fascia and tendons at thigh level, unspecified thigh, initial encounter 12/05/2013    Muscle strain of thigh    Unspecified asthma, uncomplicated ([facility]) 03/18/2019    Asthmatic bronchitis    Unspecified fall, initial encounter 09/26/2019    Fall at home, initial encounter       Surgical History  Past Surgical History:   Procedure Laterality Date    AORTIC VALVE REPLACEMENT  06/25/2014    Aortic Valve Replacement    CARDIAC CATHETERIZATION  04/23/2018    Cardiac Cath Procedure Summary    CARDIAC CATHETERIZATION N/A 4/22/2024    Procedure: Left Heart Cath;  Surgeon: Pa Gold MD;  Location: [facility] Cardiac Cath Lab;  Service: Cardiovascular;  Laterality: N/A;    CARDIAC CATHETERIZATION N/A 4/22/2024    Procedure: PCI GURINDER Stent- Coronary;  Surgeon: Pa Gold MD;  Location: [facility] Cardiac Cath Lab;  Service: Cardiovascular;  Laterality: N/A;    COLONOSCOPY  05/16/2013    Complete Colonoscopy    COLONOSCOPY  09/21/2017    Colonoscopy (Fiberoptic)    COLONOSCOPY  07/25/2014    Colonoscopy (Fiberoptic)    CORONARY ARTERY BYPASS GRAFT  04/23/2018    CABG    GALLBLADDER SURGERY  10/22/2013    Gallbladder Surgery    KNEE ARTHROSCOPY W/ DEBRIDEMENT  10/22/2013    Arthroscopy Knee Left    OTHER SURGICAL HISTORY   04/23/2018    History Of Prior Surgery    OTHER SURGICAL HISTORY  10/02/2014    Laser Suite Retina Laser Treatment    OTHER SURGICAL HISTORY  08/03/2022    Uvulopalatopharyngoplasty    OTHER SURGICAL HISTORY  08/03/2022    Tonsillectomy with adenoidectomy    OTHER SURGICAL HISTORY  10/28/2013    Heart Valve Replacement        Social History  He reports that he quit smoking about 55 years ago. His smoking use included cigarettes. He has never used smokeless tobacco. He reports that he does not drink alcohol and does not use drugs.    Family History  Family History   Problem Relation Name Age of Onset    Arthritis Mother      Other (Stroke Syndrome) Mother      Arthritis Father      Other (CABG) Father      Sleep apnea Son          Allergies  Hydrochlorothiazide, Phenylpropanolamine, Amiloride-hydrochlorothiazide, Ceramide 3b, and Chlorpheniramine-phenylpropan    Review of Systems   12 point ROS done and negative except for the above.   Physical Exam     General: NAD, well groomed, well nourished  Eyes: Non-icteric sclera, EOMI  Ears, Nose, Mouth, and Throat: External ears and nose appear to be without deformity or rash. No lesions or masses noted. Hearing is grossly intact.   Neck: Trachea midline  Respiratory: Nonlabored breathing   Cardiovascular: No peripheral edema   Skin: No rashes or open lesions/ulcers identified on skin.    Last Recorded Vitals  There were no vitals taken for this visit.    Relevant Results  Current Outpatient Medications   Medication Instructions    blood glucose control, normal solution Use as directed    blood sugar diagnostic (OneTouch Ultra Test) strip use to test blood glucose once daily    cetirizine (ZYRTEC) 10 mg capsule 1 capsule, Every morning    cholecalciferol (Vitamin D-3) 50 mcg (2,000 unit) capsule 1 capsule, Daily    clopidogrel (PLAVIX) 75 mg, oral, Daily    enoxaparin (LOVENOX) 120 mg, subcutaneous, Every 12 hours    FLUoxetine (PROZAC) 20 mg, oral, Daily    fluticasone  propion-salmeteroL (Advair Diskus) 250-50 mcg/dose diskus inhaler 1 puff, inhalation, 2 times daily RT, During summer (grass cutting)    folic acid (FOLVITE) 1 mg, oral, Every morning    furosemide (LASIX) 20 mg, 4 times weekly    HYDROcodone-acetaminophen (Norco) 5-325 mg tablet 1 tablet, oral, Every 6 hours PRN    [START ON 2/11/2025] HYDROcodone-acetaminophen (Norco) 5-325 mg tablet 1 tablet, oral, Every 6 hours PRN    Jardiance 25 mg TAKE 1 TABLET BY MOUTH DAILY    krill-om3-dha-epa-om6-lip-astx (Krill Oil, Omega 3 and 6,) 1,500-165-67.5 mg capsule 1 capsule, Daily    lancets (OneTouch Delica Plus Lancet) 33 gauge misc USE 1 LANCET PER CUTANEOUS ROUTE TO TEST BLOOD SUGAR ONCE DAILY    leflunomide (ARAVA) 20 mg, oral, Daily    levothyroxine (SYNTHROID, LEVOXYL) 150 mcg, oral, Daily, Take on an empty stomach at the same time each day, either 30 to 60 minutes prior to breakfast    losartan (COZAAR) 100 mg, oral, Daily    metFORMIN (GLUCOPHAGE) 850 mg, oral, 2 times daily (morning and late afternoon)    metoprolol tartrate (LOPRESSOR) 25 mg, oral, Daily    multivit-min/FA/lycopen/lutein (CENTRUM SILVER MEN ORAL) 1 tablet, Daily    naloxone (NARCAN) 4 mg, nasal, As needed, May repeat every 2-3 minutes if needed, alternating nostrils, until medical assistance becomes available.    nitroglycerin (NITROSTAT) 0.4 mg, sublingual, Every 5 min PRN    oxyCODONE-acetaminophen (Percocet) 5-325 mg tablet 1 tablet, oral, 3 times daily PRN    oxyCODONE-acetaminophen (Percocet) 5-325 mg tablet 1 tablet, oral, 3 times daily PRN    oxyCODONE-acetaminophen (Percocet) 5-325 mg tablet 1 tablet, oral, 3 times daily PRN    pregabalin (LYRICA) 200 mg, oral, Nightly    rosuvastatin (CRESTOR) 20 mg, oral, Daily    warfarin (Coumadin) 5 mg tablet 1.5 tablets, 5 times weekly    warfarin (Coumadin) 5 mg tablet TAKE 1 AND 1/2 TABLETS BY MOUTH  5 TIMES WEEKLY AND 1 TABLET BY  MOUTH TWICE WEEKLY OR AS  DIRECTED AFTER INR TESTING.      Lab Results  "  Component Value Date    WBC 6.0 01/20/2025    HGB 15.7 01/20/2025    HCT 47.0 01/20/2025    MCV 93 01/20/2025     01/20/2025      Lab Results   Component Value Date    INR 1.30 02/05/2025    INR 2.90 01/02/2025    INR 2.90 12/03/2024    PROTIME 35.00 01/02/2025    PROTIME 32.90 11/11/2024    PROTIME 29.1 (H) 07/21/2024     No results found for: \"PTT\"  Lab Results   Component Value Date    GLUCOSE 137 (H) 01/20/2025    CALCIUM 9.4 01/20/2025     01/20/2025    K 4.2 01/20/2025    CO2 25 01/20/2025     01/20/2025    BUN 23 01/20/2025    CREATININE 1.09 01/20/2025       === 08/27/23 ===    MR LUMBAR SPINE WO CONTRAST    - Impression -  Stable degenerative changes most pronounced at L2-3.       Assessment/Plan   Shabbir Laurent is a 74 y.o. M who presents for bilateral Lumbar transforaminal epidural steroid injection likely L3.     Risks, benefits, alternatives discussed. All questions answered to the best of my ability. Patient agrees to proceed. Consent signed and patient marked appropriately.    -We will proceed with planned procedure  -Discussed with the patient that once appropriate from a recovery standpoint, they should continue physical therapy exercises at least 2-3 times per week for best long term outcomes        Jose Jernigan MD  Interventional Pain Fellow, PGY-5  [facility]    "

## 2025-02-06 NOTE — DISCHARGE INSTRUCTIONS
[facility]  [facility] [address]  [address] [id number] [phone]    POST PROCEDURE INSTRUCTIONS    Activity  Medication used during your procedure may cause some temporary weakness or numbness in your arms or legs, depending on the type of injection you received. It is recommended that you do not drive or operate machinery the day of your injection.  You do not need to stay in bed when you get home. You should be able to resume your normal activities, including work. However, any pre-existing physical restriction you had prior to the procedure may still remain.   Have a responsible adult with you if you received sedation for the procedure. Do not drive or operate machinery for 12 hours.    Pain  Immediate pain relief from the local anesthetic will wear off after several hours.  Prolonged relief from the steroid may take 3-14 days to occur.  Some patients may experience a “flare up” of their normal pain for a few days after the procedure. You may apply ice packs to the sore area for 15minutes on/ 2 hours off and take your prescribed or over the counter analgesics as needed.  Common side effects from the steroid include facial flushing, headaches,fluid retention,trouble sleeping,and cold like symptoms for 24-48 hours post procedure.  Patients receiving diagnostic injections (no steroid): pain relief is intended to be temporary. Pay attention to the percentage of pain relief that occurs compared to before your injection so you can report this to your doctor. Pain scores before and after the procedure need to be documented.    Medications  Resume your normal medications unless otherwise instructed  If you held your blood thinning medication for the procedure,you will be instructed on when to restart.    Injection site care  Keep the injection site clean and dry. You may shower and remove the Band Aid after you arrive home.  If you notice excessive  bleeding (slow general oozing that completely soaks the dressing or fresh bright red bleeding),apply pressure and call our office immediately.  Observe for signs of infection: increasing pain at injection site, redness, swelling, drainage with a foul smell, any associated fever or chills                        If you notice any of these, call our office immediately.    Diabetic patients   You may notice an elevation in blood sugar if steroid is used. Notify your primary care doctor if blood sugar levels do not return to normal.    Follow up  Make a virtual injection follow up appointment  with Dr. Irizarry in 3-4 weeks      Call our office at [phone] to speak to the clinical staff with any concerns or problems.  Go to the nearest emergency room if you are not able to reach us and your problem is urgent.  Call [phone] if you develop serious symptoms such as: chest pain or difficulty breathing.

## 2025-02-07 DIAGNOSIS — I21.4 NSTEMI (NON-ST ELEVATED MYOCARDIAL INFARCTION) (MULTI): ICD-10-CM

## 2025-02-07 DIAGNOSIS — I95.89: ICD-10-CM

## 2025-02-07 RX ORDER — METOPROLOL TARTRATE 25 MG/1
25 TABLET, FILM COATED ORAL DAILY
Qty: 90 TABLET | Refills: 3 | Status: SHIPPED | OUTPATIENT
Start: 2025-02-07

## 2025-02-14 ENCOUNTER — APPOINTMENT (OUTPATIENT)
Dept: PRIMARY CARE | Facility: CLINIC | Age: 75
End: 2025-02-14
Payer: MEDICARE

## 2025-02-14 DIAGNOSIS — Z95.2 HISTORY OF HEART VALVE REPLACEMENT WITH MECHANICAL VALVE: ICD-10-CM

## 2025-02-14 LAB
POC INR: 1.6 (ref 2.5–3.5)
POC PROTHROMBIN TIME: 19.2

## 2025-02-14 PROCEDURE — 85610 PROTHROMBIN TIME: CPT | Performed by: FAMILY MEDICINE

## 2025-02-21 ENCOUNTER — APPOINTMENT (OUTPATIENT)
Dept: PRIMARY CARE | Facility: CLINIC | Age: 75
End: 2025-02-21
Payer: MEDICARE

## 2025-02-21 DIAGNOSIS — I73.9 PVD (PERIPHERAL VASCULAR DISEASE) (CMS-HCC): ICD-10-CM

## 2025-02-21 LAB
POC INR: 2.6
POC PROTHROMBIN TIME: NORMAL

## 2025-02-21 PROCEDURE — 85610 PROTHROMBIN TIME: CPT | Performed by: FAMILY MEDICINE

## 2025-03-05 ENCOUNTER — TELEPHONE (OUTPATIENT)
Dept: CARDIOLOGY | Facility: CLINIC | Age: 75
End: 2025-03-05
Payer: COMMERCIAL

## 2025-03-05 NOTE — TELEPHONE ENCOUNTER
Pt wife called states  HR has been in 40's according to [employer] and has had increased fatigue.   Currently taking Metoprolol 25mg daily  Losartan 100mg daily

## 2025-03-06 NOTE — TELEPHONE ENCOUNTER
Per Dr. Goins, called and notified [place] to have pt stop Metoprolol tartrate. [place] verbalizes understanding. Medication list updated.

## 2025-03-07 ENCOUNTER — APPOINTMENT (OUTPATIENT)
Dept: PRIMARY CARE | Facility: CLINIC | Age: 75
End: 2025-03-07
Payer: MEDICARE

## 2025-03-07 DIAGNOSIS — I73.9 PVD (PERIPHERAL VASCULAR DISEASE) (CMS-HCC): ICD-10-CM

## 2025-03-07 LAB
POC INR: 2.8
POC PROTHROMBIN TIME: NORMAL

## 2025-03-07 PROCEDURE — 85610 PROTHROMBIN TIME: CPT | Performed by: FAMILY MEDICINE

## 2025-03-10 ENCOUNTER — OFFICE VISIT (OUTPATIENT)
Dept: PAIN MEDICINE | Facility: CLINIC | Age: 75
End: 2025-03-10
Payer: MEDICARE

## 2025-03-10 VITALS
DIASTOLIC BLOOD PRESSURE: 85 MMHG | SYSTOLIC BLOOD PRESSURE: 151 MMHG | OXYGEN SATURATION: 95 % | RESPIRATION RATE: 20 BRPM | HEART RATE: 83 BPM

## 2025-03-10 DIAGNOSIS — M51.16 DISPLACEMENT OF LUMBAR DISC WITH RADICULOPATHY: ICD-10-CM

## 2025-03-10 DIAGNOSIS — M16.11 ARTHRITIS OF RIGHT HIP: ICD-10-CM

## 2025-03-10 PROCEDURE — 1125F AMNT PAIN NOTED PAIN PRSNT: CPT | Performed by: ANESTHESIOLOGY

## 2025-03-10 PROCEDURE — G2211 COMPLEX E/M VISIT ADD ON: HCPCS | Performed by: ANESTHESIOLOGY

## 2025-03-10 PROCEDURE — 3077F SYST BP >= 140 MM HG: CPT | Performed by: ANESTHESIOLOGY

## 2025-03-10 PROCEDURE — 99213 OFFICE O/P EST LOW 20 MIN: CPT | Performed by: ANESTHESIOLOGY

## 2025-03-10 PROCEDURE — 1123F ACP DISCUSS/DSCN MKR DOCD: CPT | Performed by: ANESTHESIOLOGY

## 2025-03-10 PROCEDURE — 3079F DIAST BP 80-89 MM HG: CPT | Performed by: ANESTHESIOLOGY

## 2025-03-10 PROCEDURE — 4010F ACE/ARB THERAPY RXD/TAKEN: CPT | Performed by: ANESTHESIOLOGY

## 2025-03-10 RX ORDER — HYDROCODONE BITARTRATE AND ACETAMINOPHEN 5; 325 MG/1; MG/1
1 TABLET ORAL EVERY 6 HOURS PRN
Qty: 84 TABLET | Refills: 0 | Status: SHIPPED | OUTPATIENT
Start: 2025-04-07 | End: 2025-05-05

## 2025-03-10 RX ORDER — HYDROCODONE BITARTRATE AND ACETAMINOPHEN 5; 325 MG/1; MG/1
1 TABLET ORAL EVERY 6 HOURS PRN
Qty: 84 TABLET | Refills: 0 | Status: SHIPPED | OUTPATIENT
Start: 2025-05-05 | End: 2025-06-02

## 2025-03-10 RX ORDER — HYDROCODONE BITARTRATE AND ACETAMINOPHEN 5; 325 MG/1; MG/1
1 TABLET ORAL EVERY 6 HOURS PRN
Qty: 84 TABLET | Refills: 0 | Status: SHIPPED | OUTPATIENT
Start: 2025-03-10 | End: 2025-04-07

## 2025-03-10 RX ORDER — HYDROCODONE BITARTRATE AND ACETAMINOPHEN 5; 325 MG/1; MG/1
1 TABLET ORAL EVERY 6 HOURS PRN
Qty: 84 TABLET | Refills: 0 | Status: SHIPPED | OUTPATIENT
Start: 2025-06-02 | End: 2025-06-30

## 2025-03-10 ASSESSMENT — PAIN DESCRIPTION - DESCRIPTORS: DESCRIPTORS: SORE

## 2025-03-10 ASSESSMENT — PAIN - FUNCTIONAL ASSESSMENT: PAIN_FUNCTIONAL_ASSESSMENT: 0-10

## 2025-03-10 ASSESSMENT — PAIN SCALES - GENERAL: PAINLEVEL_OUTOF10: 4

## 2025-03-10 NOTE — PROGRESS NOTES
History Of Present Illness  Shabbir Laurent is a 74 y.o. male with a past medical history of coronary artery disease, mechanical aortic valve on anticoagulation, AUBREY, diastolic heart failure presents to pain clinic for management of low back pain.  Patient is followed with pain clinic for bilateral lower extremity radicular symptoms secondary to spinal stenosis with neurogenic claudication.  Patient underwent bilateral L3 transforaminal epidural steroid injection on 2/6/25 with nearly 90% relief that is still helping.  Patient reports that the radicular pain is nearly gone.  Still having some back pain currently rated 4 out of 10 in severity.  Patient taking the Norco 5 only as needed.  Patient did not fill last refill last month.  Denies any bleeding symptoms at this time.  No bowel or bladder incontinence, no saddle anesthesia, no weakness or falls.    Past Medical History  He has a past medical history of Abrasion, right lower leg, initial encounter (09/26/2019), Body mass index (BMI) 37.0-37.9, adult (02/28/2020), Closed fracture of lower end of right radius with routine healing (03/03/2023), Corns and callosities (03/22/2019), Coronary artery disease, Diverticulosis of intestine, part unspecified, without perforation or abscess without bleeding, Hypertension, Other conditions influencing health status (08/30/2016), Other forms of angina pectoris, Other hyperlipidemia, Other postherpetic nervous system involvement (10/07/2015), Pain in right ankle and joints of right foot (01/15/2020), Personal history of colonic polyps, Personal history of diseases of the skin and subcutaneous tissue (09/29/2016), Personal history of other (healed) physical injury and trauma (08/11/2014), Personal history of other diseases of the circulatory system, Personal history of other diseases of the musculoskeletal system and connective tissue, Personal history of other diseases of the musculoskeletal system and connective tissue  (07/06/2013), Personal history of other diseases of the nervous system and sense organs (10/28/2013), Personal history of other diseases of the respiratory system (03/04/2019), Personal history of other endocrine, nutritional and metabolic disease, Personal history of other endocrine, nutritional and metabolic disease (05/02/2019), Personal history of other endocrine, nutritional and metabolic disease (05/10/2018), Personal history of other mental and behavioral disorders (04/07/2020), Personal history of other specified conditions, Personal history of other specified conditions, Prediabetes (10/04/2018), Presence of aortocoronary bypass graft, Presence of prosthetic heart valve, Rash and other nonspecific skin eruption (01/10/2017), Spondylosis without myelopathy or radiculopathy, lumbar region (07/06/2013), Strain of unspecified muscles, fascia and tendons at thigh level, unspecified thigh, initial encounter (12/05/2013), Unspecified asthma, uncomplicated ([facility]) (03/18/2019), and Unspecified fall, initial encounter (09/26/2019).    Surgical History  He has a past surgical history that includes Knee arthroscopy w/ debridement (10/22/2013); Gallbladder surgery (10/22/2013); Colonoscopy (05/16/2013); Coronary artery bypass graft (04/23/2018); Other surgical history (04/23/2018); Colonoscopy (09/21/2017); Cardiac catheterization (04/23/2018); Colonoscopy (07/25/2014); Other surgical history (10/02/2014); Aortic valve replacement (06/25/2014); Other surgical history (08/03/2022); Other surgical history (08/03/2022); Other surgical history (10/28/2013); Cardiac catheterization (N/A, 4/22/2024); and Cardiac catheterization (N/A, 4/22/2024).     Social History  He reports that he quit smoking about 55 years ago. His smoking use included cigarettes. He has never used smokeless tobacco. He reports that he does not drink alcohol and does not use drugs.    Family History  Family History   Problem Relation Name Age of Onset     Arthritis Mother      Other (Stroke Syndrome) Mother      Arthritis Father      Other (CABG) Father      Sleep apnea Son          Allergies  Hydrochlorothiazide, Phenylpropanolamine, Amiloride-hydrochlorothiazide, Ceramide 3b, and Chlorpheniramine-phenylpropan    Review of Symptoms:   Constitutional: Negative for chills, diaphoresis or fever  HENT: Negative for neck swelling  Eyes:.  Negative for eye pain  Respiratory:.  Negative for cough, shortness of breath or wheezing    Cardiovascular:.  Negative for chest pain or palpitations  Gastrointestinal:.  Negative for abdominal pain, nausea and vomiting  Genitourinary:.  Negative for urgency  Musculoskeletal:  Positive for back pain. Positive for joint pain. Denies falls within the past 3 months.  Skin: Negative for wounds or itching   Neurological: Negative for dizziness, seizures, loss of consciousness and weakness  Endo/Heme/Allergies: Does not bruise/bleed easily  Psychiatric/Behavioral: Negative for depression. The patient does not appear anxious.       PHYSICAL EXAM  Vitals signs reviewed  Constitutional:       General: Not in acute distress     Appearance: Normal appearance. Not ill-appearing.  HENT:     Head: Normocephalic and atraumatic  Eyes:     Conjunctiva/sclera: Conjunctivae normal  Cardiovascular:     Rate and Rhythm: Normal rate and regular rhythm  Pulmonary:     Effort: No respiratory distress  Abdominal:     Palpations: Abdomen is soft  Musculoskeletal: DEL CID  Skin:     General: Skin is warm and dry  Neurological:     General: No focal deficit present  Psychiatric:         Mood and Affect: Mood normal         Behavior: Behavior normal    Advanced Exam   Inspection: No gross deformities, no surgical scars  Palpation: No tenderness of patient of lumbar midline, lumbar paraspinals, bilateral SI joints  ROM: Normal range of motion of the lumbar flexion extension  Motor: 5-5 strength upper and lower extremities  Sensory: Negative for sensory abnormalities  in upper and lower extremities  Reflexes: 2+ reflexes bilateral upper and lower extremities  Lumbar: Negative straight leg raising bilaterally, negative for facet loading  Sacral: Negative Lenin, negative Gaenslen's  Hip: Negative for pain with anterior, lateral, posterior palpation of hip joints, negative FADIR, negative for internal/external rotation of the hip, negative logroll     Last Recorded Vitals  /85   Pulse 83   Resp 20   SpO2 95%     Relevant Results  Current Outpatient Medications   Medication Instructions    blood glucose control, normal solution Use as directed    blood sugar diagnostic (OneTouch Ultra Test) strip use to test blood glucose once daily    cetirizine (ZYRTEC) 10 mg capsule 1 capsule, Every morning    cholecalciferol (Vitamin D-3) 50 mcg (2,000 unit) capsule 1 capsule, Daily    clopidogrel (PLAVIX) 75 mg, oral, Daily    enoxaparin (LOVENOX) 120 mg, subcutaneous, Every 12 hours    FLUoxetine (PROZAC) 20 mg, oral, Daily    fluticasone propion-salmeteroL (Advair Diskus) 250-50 mcg/dose diskus inhaler 1 puff, inhalation, 2 times daily RT, During summer (grass cutting)    folic acid (FOLVITE) 1 mg, oral, Every morning    furosemide (LASIX) 20 mg, 4 times weekly    HYDROcodone-acetaminophen (Norco) 5-325 mg tablet 1 tablet, oral, Every 6 hours PRN    Jardiance 25 mg TAKE 1 TABLET BY MOUTH DAILY    krill-om3-dha-epa-om6-lip-astx (Krill Oil, Omega 3 and 6,) 1,500-165-67.5 mg capsule 1 capsule, Daily    lancets (OneTouch Delica Plus Lancet) 33 gauge misc USE 1 LANCET PER CUTANEOUS ROUTE TO TEST BLOOD SUGAR ONCE DAILY    leflunomide (ARAVA) 20 mg, oral, Daily    levothyroxine (SYNTHROID, LEVOXYL) 150 mcg, oral, Daily, Take on an empty stomach at the same time each day, either 30 to 60 minutes prior to breakfast    losartan (COZAAR) 100 mg, oral, Daily    metFORMIN (GLUCOPHAGE) 850 mg, oral, 2 times daily (morning and late afternoon)    multivit-min/FA/lycopen/lutein (CENTRUM SILVER MEN  ORAL) 1 tablet, Daily    naloxone (NARCAN) 4 mg, nasal, As needed, May repeat every 2-3 minutes if needed, alternating nostrils, until medical assistance becomes available.    nitroglycerin (NITROSTAT) 0.4 mg, sublingual, Every 5 min PRN    oxyCODONE-acetaminophen (Percocet) 5-325 mg tablet 1 tablet, oral, 3 times daily PRN    oxyCODONE-acetaminophen (Percocet) 5-325 mg tablet 1 tablet, oral, 3 times daily PRN    oxyCODONE-acetaminophen (Percocet) 5-325 mg tablet 1 tablet, oral, 3 times daily PRN    pregabalin (LYRICA) 200 mg, oral, Nightly    rosuvastatin (CRESTOR) 20 mg, oral, Daily    warfarin (Coumadin) 5 mg tablet 1.5 tablets, 5 times weekly    warfarin (Coumadin) 5 mg tablet TAKE 1 AND 1/2 TABLETS BY MOUTH  5 TIMES WEEKLY AND 1 TABLET BY  MOUTH TWICE WEEKLY OR AS  DIRECTED AFTER INR TESTING.         MR lumbar spine wo IV contrast 08/27/2023    Narrative  Interpreted By:  WILLY PEREZ MD, PHD  MRN: [id number]  Patient Name: GIORGIO EDWARDS    STUDY:  MRI L-SPINE WO;  8/27/2023 10:40 am    INDICATION:  Persistent low back pain and radiating hip and pelvis pain and groin  pain on the right.  Can radiate towards the knee.  Sharp and  stabbing.  More persistent and intense over the last 6 months.  Weakness with weightbearing activities.  No fall    COMPARISON:  Lumbar spine radiographs, same day and lumbar spine MRI, 03/12/2021    ACCESSION NUMBER(S):  [id number]    ORDERING CLINICIAN:  AVELINA MELO    TECHNIQUE:  Sagittal T1, T2, STIR, axial T1 and T2 weighted images of the lumbar  spine were acquired.    FINDINGS:  5 lumbar-type vertebral bodies are again noted, the last well-formed  disc space is labeled L5-S1.    Alignment: Mild retrolisthesis at L1-2, L2-3, and L3-4 and grade 1  anterolisthesis at L4-5, similar to previous.    Vertebrae/Intervertebral Discs: The vertebral bodies demonstrate  expected height. Multilevel degenerative endplate changes with  associated marrow edema at L2-3. Nodular T1 hyperintense  signal at L1  is unchanged from previous and may represent focal fatty marrow or a  benign hemangioma. Multilevel loss of normal disc T2 signal intensity  and disc height.    Conus: The lower thoracic cord appears unremarkable. The conus  terminates at L1. The cauda equina is unremarkable.    T12-L1: Disc bulge and facet hypertrophy. No canal or foraminal  narrowing.    L1-2: Disc bulge, facet hypertrophy, and ligamentum flavum thickening  with diffuse mild spinal canal narrowing and mild bilateral neural  foramen narrowing, similar to previous.    L2-3: Disc bulge with a superimposed inferiorly directed right  central disc extrusion, facet hypertrophy, ligamentum flavum  thickening, and prominent epidural fat with severe spinal canal  narrowing and moderate left and severe right neural foramen  narrowing, similar to previous.    L3-4: Disc bulge, facet hypertrophy, ligamentum flavum thickening,  and prominent epidural fat with diffuse moderate spinal canal  narrowing and moderate bilateral neural foramen narrowing, similar to  previous.    L4-5: Disc bulge, facet hypertrophy, and ligamentum flavum thickening  with diffuse moderate spinal canal narrowing and mild right and  moderate left neural foramen narrowing, similar to previous.    L5-S1: Disc bulge, facet hypertrophy, and ligamentum flavum  thickening with mild left neural foramen narrowing, similar to  previous.    Marked paraspinal muscular atrophy, the paraspinal soft tissues are  otherwise unremarkable. Partially visualized distension of the  bladder.    Impression  Stable degenerative changes most pronounced at L2-3.      MR CERVICAL SPINE WO IV CONTRAST 02/18/2022    Narrative  MRN: [id number]  Patient Name: GIORGIO EDWARDS    STUDY:  MRI CERVICAL WO;  2/18/2022 11:43 am    INDICATION:  severe neck pain and cracking noise and feel dizziness when bends  head and neck in different directions  R42: Vertigo M47.812: Cervical  arthritis G95.9: Cervical  myelopathy.    COMPARISON:  MRI of the cervical spine from 06/21/2010    ACCESSION NUMBER(S):  [id number]    ORDERING CLINICIAN:  MALINDA STEELE    TECHNIQUE:  Sagittal T1, T2, STIR, axial T1 and axial T2 weighted images were  acquired through the cervical spine.    FINDINGS:  Alignment: The vertebral alignment is within normal limits.    Vertebrae/Intervertebral Discs: The vertebral bodies demonstrate  expected height.  A well-defined T1 hypointense and T2 hyperintense  lesion within the odontoid is minimally increased in size from the  2010 MRI, now measuring up to 1.4 cm (previously 1.0 cm). The marrow  signal is otherwise within normal limits. Disc desiccation throughout  the cervical spine.    Cord: Normal in caliber and signal.    C1-C2: The cervicomedullary junction appears unremarkable. There is  no central canal stenosis.    C2-C3: Posterior disc osteophyte complex slightly eccentric to the  left and mild bilateral facet arthropathy with mild effacement of the  ventral thecal sac. The bilateral neural foramen are normal. No  measurable spinal canal stenosis.    C3-C4: No posterior disc contour abnormality. There is moderate  bilateral facet joint arthropathy. Bilateral uncovertebral joint  spurs more prominent on the right. Constellation of findings causes  mild narrowing of the spinal canal with disc material and  uncovertebral joint spurring causing moderate left and severe right  neural foraminal narrowing.    C4-C5: Posterior disc osteophyte complex and bilateral facet  arthropathy with mild spinal canal narrowing and moderate bilateral  neural foraminal narrowing, right more than left.    C5-C6: Posterior disc osteophyte complex, asymmetric to the left, and  moderate bilateral facet arthropathy. Causing effacement of the  ventral thecal sac and mild narrowing of the left lateral recess and  left neural foramen. No spinal canal or right neural foraminal  narrowing.    C6-C7: Posterior disc osteophyte  complex mildly effaces the ventral  thecal sac without spinal canal stenosis or neural foraminal  narrowing.    C7-T1: There is a focal central disc herniation which mildly effaces  the ventral thecal sac. No spinal canal stenosis. There is mild  bilateral facet joint arthropathy. No neural foraminal narrowing.    T1-T2: Given sagittal views only there is no spinal canal stenosis or  neural foramen narrowing.    The prevertebral and posterior paraspinous soft tissues are within  normal limits.    Impression  1. Multilevel cervical spondylosis, with severe neural foramen  narrowing on the right at C3-C4 and multilevel moderate neural  foramen narrowing. Detailed description is given above. There is no  severe spinal canal stenosis.  2. Minimal interval increase in size of a well-defined osseous lesion  in the odontoid process from 2010, favoring a benign/nonaggressive  process.    I personally reviewed the images/study and I agree with the findings  as stated. This study was interpreted at [place], [place].     Transforaminal  Table formatting from the original result was not included.  Procedure  Transforaminal    Indication  Neurogenic claudication due to lumbar spinal stenosis    Medications  lidocaine PF (Xylocaine) 5 mg/mL (0.5 %) injection 11 mL   sodium bicarbonate injection 1 mEq   iohexol (OMNIPaque) 240 mg iodine/mL solution 5 mL   dexAMETHasone (PF) (Decadron) injection 10 mg   (Totals for administrations occurring from 1359 to 1436 on 02/06/25)     Preprocedure  A history and physical has been performed, and patient medication   allergies have been reviewed. The patient's tolerance of previous   anesthesia has been reviewed. The risks and benefits of the procedure and   the sedation options and risks were discussed with the patient. All   questions were answered and informed consent obtained.    Details of the Procedure  History reviewed patient interviewed  and examined.  Risks and benefits of   procedures discussed and patient agreed to proceed and consent was signed.    With the patient in the prone position the lower back was prepped and   draped in sterile fashion.  Under AP guidance L3-L4 vertebral bodies were   identified.  In a lateral oblique view the L3  foramen were identified and   used as a trajectory view.  A 22-gauge 5 spinal needle was introduced into   L3 foramen.  Position was identified in AP and lateral.  IV contrast   showed adequate foraminal spread without any vascular or intrathecal   uptake.  Lidocaine 0.5% mixed with dexamethasone 10 mg a total of 2.0cc   was injected in each foramen.  Patient tolerated the procedure without any   problems and was transferred to recovery room in stable condition.     Procedure Provider  Brandon Irizarry MD    Procedure Location  [facility]  [address] [place] [address]    Referring Provider  MYRA Spangler-[address]  [place] [address]       1. Displacement of lumbar disc with radiculopathy        2. Arthritis of right hip             ASSESSMENT/PLAN  Shabbir Laurent is a 73 y.o. male with a past medical history of coronary artery disease, mechanical aortic valve on anticoagulation, AUBREY, diastolic heart failure presents to pain clinic for management of low back pain.  Patient has had sustained lasting relief with bilateral L3 transforaminal injection (>90%, ongoing from 2/6/25) currently pain is well-controlled.  Current pain medications include Lyrica 200 mg at nighttime and norco 5 mg q6h as needed  PDMP reviewed this visit with no violations.         Our plan is as follows:  Continue medications as prescribed, opioid contract renewed today  - Continue physical therapy and home exercise program  - Can consider repeat bilateral L3 transforaminal injections in the future, patient has had 90% relief and is still working at this time.  -  The patient was counseled on the appropriate use of the opioid medication, its potential dangers and the responsibilities that go along with being prescribed opioid medications including safe storage, use and disposal of the medication. Patient was also counseled on the adverse effects of long-term opioid use including hyperalgesia, tolerance, decreased immune function, and changes to the body's natural hormones. The patient also understands the potential risks for respiratory depression especially if the medication is combined with other central nervous system depressants such as benzodiazepines or alcohol.    Patient seen and plan of care discussed with Dr. Juan C Marcos MD

## 2025-03-24 DIAGNOSIS — I21.4 NSTEMI (NON-ST ELEVATED MYOCARDIAL INFARCTION) (MULTI): ICD-10-CM

## 2025-03-24 DIAGNOSIS — I50.32 CHRONIC DIASTOLIC CONGESTIVE HEART FAILURE: ICD-10-CM

## 2025-03-24 RX ORDER — CLOPIDOGREL BISULFATE 75 MG/1
75 TABLET ORAL DAILY
Qty: 90 TABLET | Refills: 3 | Status: SHIPPED | OUTPATIENT
Start: 2025-03-24

## 2025-03-27 ENCOUNTER — APPOINTMENT (OUTPATIENT)
Dept: PRIMARY CARE | Facility: CLINIC | Age: 75
End: 2025-03-27
Payer: COMMERCIAL

## 2025-03-27 DIAGNOSIS — I73.9 PVD (PERIPHERAL VASCULAR DISEASE) (CMS-HCC): ICD-10-CM

## 2025-03-27 LAB
POC INR: 2.6
POC PROTHROMBIN TIME: NORMAL

## 2025-03-27 PROCEDURE — 85610 PROTHROMBIN TIME: CPT

## 2025-04-16 PROBLEM — R35.0 URINARY FREQUENCY: Status: RESOLVED | Noted: 2023-03-03 | Resolved: 2025-04-16

## 2025-04-16 PROBLEM — R35.0 FREQUENCY OF URINATION: Status: RESOLVED | Noted: 2023-03-03 | Resolved: 2025-04-16

## 2025-04-16 PROBLEM — N39.0 URINARY TRACT INFECTION: Status: RESOLVED | Noted: 2023-03-03 | Resolved: 2025-04-16

## 2025-04-16 PROBLEM — R31.9 HEMATURIA: Status: RESOLVED | Noted: 2023-03-03 | Resolved: 2025-04-16

## 2025-04-17 ENCOUNTER — APPOINTMENT (OUTPATIENT)
Dept: PRIMARY CARE | Facility: CLINIC | Age: 75
End: 2025-04-17
Payer: COMMERCIAL

## 2025-04-18 ENCOUNTER — APPOINTMENT (OUTPATIENT)
Dept: PRIMARY CARE | Facility: CLINIC | Age: 75
End: 2025-04-18
Payer: COMMERCIAL

## 2025-04-18 VITALS
HEIGHT: 72 IN | OXYGEN SATURATION: 98 % | BODY MASS INDEX: 37.25 KG/M2 | HEART RATE: 72 BPM | DIASTOLIC BLOOD PRESSURE: 70 MMHG | WEIGHT: 275 LBS | SYSTOLIC BLOOD PRESSURE: 132 MMHG

## 2025-04-18 DIAGNOSIS — I10 BENIGN ESSENTIAL HYPERTENSION: ICD-10-CM

## 2025-04-18 DIAGNOSIS — I50.32 CHRONIC DIASTOLIC CONGESTIVE HEART FAILURE: ICD-10-CM

## 2025-04-18 DIAGNOSIS — Z12.11 SCREENING FOR COLORECTAL CANCER: ICD-10-CM

## 2025-04-18 DIAGNOSIS — E11.42 TYPE 2 DIABETES MELLITUS WITH DIABETIC POLYNEUROPATHY, WITHOUT LONG-TERM CURRENT USE OF INSULIN: ICD-10-CM

## 2025-04-18 DIAGNOSIS — F19.982: ICD-10-CM

## 2025-04-18 DIAGNOSIS — Z12.12 SCREENING FOR COLORECTAL CANCER: ICD-10-CM

## 2025-04-18 DIAGNOSIS — G95.9 CERVICAL MYELOPATHY: ICD-10-CM

## 2025-04-18 DIAGNOSIS — Z95.2 HISTORY OF HEART VALVE REPLACEMENT WITH MECHANICAL VALVE: Primary | ICD-10-CM

## 2025-04-18 DIAGNOSIS — E78.2 MIXED HYPERLIPIDEMIA: ICD-10-CM

## 2025-04-18 DIAGNOSIS — E66.812 CLASS 2 SEVERE OBESITY DUE TO EXCESS CALORIES WITH SERIOUS COMORBIDITY AND BODY MASS INDEX (BMI) OF 35.0 TO 35.9 IN ADULT: ICD-10-CM

## 2025-04-18 DIAGNOSIS — E66.01 CLASS 2 SEVERE OBESITY DUE TO EXCESS CALORIES WITH SERIOUS COMORBIDITY AND BODY MASS INDEX (BMI) OF 35.0 TO 35.9 IN ADULT: ICD-10-CM

## 2025-04-18 DIAGNOSIS — I11.0 HYPERTENSIVE HEART DISEASE WITH HEART FAILURE: ICD-10-CM

## 2025-04-18 LAB
POC APPEARANCE, URINE: CLEAR
POC BILIRUBIN, URINE: NEGATIVE
POC BLOOD, URINE: NEGATIVE
POC COLOR, URINE: YELLOW
POC GLUCOSE, URINE: NEGATIVE MG/DL
POC HEMOGLOBIN A1C: 6.8 % (ref 4.2–6.5)
POC INR: 2.4 (ref 0.9–1.1)
POC KETONES, URINE: NEGATIVE MG/DL
POC LEUKOCYTES, URINE: NEGATIVE
POC NITRITE,URINE: NEGATIVE
POC PH, URINE: 5 PH
POC PROTEIN, URINE: NEGATIVE MG/DL
POC PROTHROMBIN TIME: ABNORMAL (ref 9.3–12.5)
POC SPECIFIC GRAVITY, URINE: 1.01
POC UROBILINOGEN, URINE: 0.2 EU/DL

## 2025-04-18 ASSESSMENT — ENCOUNTER SYMPTOMS
LOSS OF SENSATION IN FEET: 0
DEPRESSION: 0
OCCASIONAL FEELINGS OF UNSTEADINESS: 1

## 2025-04-18 ASSESSMENT — PATIENT HEALTH QUESTIONNAIRE - PHQ9
SUM OF ALL RESPONSES TO PHQ9 QUESTIONS 1 & 2: 0
2. FEELING DOWN, DEPRESSED OR HOPELESS: NOT AT ALL
1. LITTLE INTEREST OR PLEASURE IN DOING THINGS: NOT AT ALL

## 2025-04-18 NOTE — PROGRESS NOTES
Subjective   Patient ID: Shabbir Laurent is a 74 y.o. male who presents for Diabetes (Diabetes checkup refills ).  HPI  No SE meds  Back not as bad- got an injection  Still some fatigue    No CP, SOB, sweating  No palpitations, n/v, dizziness  No HA, vision changes  No weakness  Usual numbness in feet     Ophtho- 10/24  Dentist- 10/24  Colonoscopy- 2014- repeat 10 years  RENE-  Cologuard- 8/22  PSA- 11/24  UA/Micro- ordered  Lung CT-  Coronary Calcium CT Score-  AAA- had repaired  EKG-  Pneumovax- 10/15  RSV- 1/24  Prevnar- 11/17  Flu- 10/24  Shingrix- 2 doses  Td- 1/24  Hep C-  Advance Directives- has them  AWV- 11/24  MDD screen- 11/24  ETOH screen- 11/24      Current Medications[1]   Surgical History[2]   Medical History[3]  Social History[4]   Family History[5]   Review of Systems    Objective   /70   Pulse 72   Ht 1.829 m (6')   Wt 125 kg (275 lb)   SpO2 98%   BMI 37.30 kg/m²    Physical Exam  Vitals and nursing note reviewed.   Constitutional:       General: He is not in acute distress.     Appearance: Normal appearance. He is not ill-appearing.   HENT:      Head: Normocephalic and atraumatic.      Right Ear: Tympanic membrane, ear canal and external ear normal. Decreased hearing noted.      Left Ear: Tympanic membrane, ear canal and external ear normal. Decreased hearing noted.      Nose: No congestion or rhinorrhea.      Mouth/Throat:      Pharynx: No oropharyngeal exudate or posterior oropharyngeal erythema.   Eyes:      Extraocular Movements: Extraocular movements intact.      Conjunctiva/sclera: Conjunctivae normal.      Pupils: Pupils are equal, round, and reactive to light.   Neck:      Vascular: No carotid bruit.   Cardiovascular:      Rate and Rhythm: Normal rate and regular rhythm. Frequent Extrasystoles are present.     Pulses: Normal pulses.      Heart sounds: Murmur heard.      Systolic murmur is present with a grade of 2/6.      Comments: Mechanical heart valve heard  Pulmonary:       Effort: Pulmonary effort is normal.      Breath sounds: Normal breath sounds. No wheezing, rhonchi or rales.   Abdominal:      General: Abdomen is flat. Bowel sounds are normal.      Palpations: Abdomen is soft.   Musculoskeletal:         General: Normal range of motion.      Cervical back: Normal range of motion and neck supple.   Lymphadenopathy:      Cervical: No cervical adenopathy.   Skin:     Capillary Refill: Capillary refill takes less than 2 seconds.   Neurological:      General: No focal deficit present.      Mental Status: He is alert and oriented to person, place, and time.      Cranial Nerves: No cranial nerve deficit.      Sensory: No sensory deficit.      Motor: No weakness.      Coordination: Coordination normal.      Deep Tendon Reflexes: Reflexes normal.   Psychiatric:         Mood and Affect: Mood normal.         Behavior: Behavior normal.         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 (Multi)    Relevant Orders    POCT INR manually resulted (Completed)    Hyperlipidemia    Relevant Orders    Lipid Panel    Comprehensive Metabolic Panel    POCT INR manually resulted (Completed)    History of heart valve replacement with mechanical valve - Primary    Relevant Orders    POCT INR manually resulted (Completed)    Chronic diastolic congestive heart failure    Relevant Orders    POCT INR manually resulted (Completed)    Cervical myelopathy    Relevant Orders    POCT INR manually resulted (Completed)    Benign essential hypertension    Relevant Orders    Comprehensive Metabolic Panel    POCT INR manually resulted  (Completed)     Other Visit Diagnoses         Hypertensive heart disease with heart failure        Relevant Orders    POCT INR manually resulted (Completed)      Class 2 severe obesity due to excess calories with serious comorbidity and body mass index (BMI) of 35.0 to 35.9 in adult        Relevant Orders    POCT INR manually resulted (Completed)      Screening for colorectal cancer        Relevant Orders    Cologuard® colon cancer screening    POCT INR manually resulted (Completed)          Renewed/continued rest of medications  Checked  labs  Updated Health Maintenance in HPI section  HTN, controlled- continue meds, low sodium diet     Obesity- caloric restriction, increase CV exercise     Hyperlipidemia- continue meds, low fat/cholesterol diet      NSTEMI/CAD- 2 stents placed, Brilinta for 12 months, follow with cardiology, ASA, lipitor     Obesity- exercise, limit calories     PVD- ASA, lipitor, plavix     DM, type 2- avoid sugars, low carbs, increase CV exercise, continue meds, check feet daily     Mechanical Heart Valve- coumadin, INR goal 2.5-3.5     Inflammatory Polyarthropathy- Arava, heat     Asthma- avoid poor air quality, advair     Hypersomnia- sleep hygiene, avoid sedatives     AUBREY- CPAP, weight loss     Neuropathy- Lyrica, check feet daily     Hypothyroidism- follow TSH, levothyroxine    Patient understands and agrees with treatment plan    eJan Polanco DO        [1]   Current Outpatient Medications:     blood glucose control, normal solution, Use as directed, Disp: , Rfl:     blood sugar diagnostic (OneTouch Ultra Test) strip, use to test blood glucose once daily, Disp: 100 strip, Rfl: 2    cetirizine (ZYRTEC) 10 mg capsule, Take 1 capsule (10 mg) by mouth once daily in the morning., Disp: , Rfl:     cholecalciferol (Vitamin D-3) 50 mcg (2,000 unit) capsule, Take 1 capsule (50 mcg) by mouth early in the morning.., Disp: , Rfl:     clopidogrel (Plavix) 75 mg tablet, TAKE 1 TABLET BY MOUTH ONCE   DAILY, Disp: 90 tablet, Rfl: 3    FLUoxetine (PROzac) 20 mg capsule, Take 1 capsule (20 mg) by mouth once daily., Disp: 90 capsule, Rfl: 3    fluticasone propion-salmeteroL (Advair Diskus) 250-50 mcg/dose diskus inhaler, Inhale 1 puff 2 times a day. During summer (grass cutting), Disp: 180 each, Rfl: 3    folic acid (Folvite) 1 mg tablet, Take 1 tablet (1 mg) by mouth once daily in the morning., Disp: 90 tablet, Rfl: 3    furosemide (Lasix) 20 mg tablet, Take 1 tablet (20 mg) by mouth 4 times a week. Take every Mon, Wed, Fri, and Sat, Disp: , Rfl:     HYDROcodone-acetaminophen (Norco) 5-325 mg tablet, Take 1 tablet by mouth every 6 hours if needed for severe pain (7 - 10) for up to 28 days. Do not fill before April 7, 2025., Disp: 84 tablet, Rfl: 0    [START ON 5/5/2025] HYDROcodone-acetaminophen (Norco) 5-325 mg tablet, Take 1 tablet by mouth every 6 hours if needed for severe pain (7 - 10) for up to 28 days. Do not fill before May 5, 2025., Disp: 84 tablet, Rfl: 0    [START ON 6/2/2025] HYDROcodone-acetaminophen (Norco) 5-325 mg tablet, Take 1 tablet by mouth every 6 hours if needed for severe pain (7 - 10) for up to 28 days. Do not fill before June 2, 2025., Disp: 84 tablet, Rfl: 0    Jardiance 25 mg, TAKE 1 TABLET BY MOUTH DAILY, Disp: 90 tablet, Rfl: 3    krill-om3-dha-epa-om6-lip-astx (Krill Oil, Omega 3 and 6,) 1,500-165-67.5 mg capsule, Take 1 capsule by mouth once daily., Disp: , Rfl:     lancets (OneTouch Delica Plus Lancet) 33 gauge misc, USE 1 LANCET PER CUTANEOUS ROUTE TO TEST BLOOD SUGAR ONCE DAILY, Disp: 100 each, Rfl: 2    levothyroxine (Synthroid, Levoxyl) 150 mcg tablet, Take 1 tablet (150 mcg) by mouth early in the morning.. Take on an empty stomach at the same time each day, either 30 to 60 minutes prior to breakfast, Disp: 90 tablet, Rfl: 3    losartan (Cozaar) 100 mg tablet, TAKE 1 TABLET BY MOUTH ONCE  DAILY, Disp: 90 tablet, Rfl: 3    metFORMIN (Glucophage) 850 mg tablet, TAKE 1 TABLET BY  MOUTH TWICE  DAILY WITH MEALS, Disp: 180 tablet, Rfl: 3    multivit-min/FA/lycopen/lutein (CENTRUM SILVER MEN ORAL), Take 1 tablet by mouth once daily., Disp: , Rfl:     naloxone (Narcan) 4 mg/0.1 mL nasal spray, Administer 1 spray (4 mg) into affected nostril(s) if needed for opioid reversal or respiratory depression. May repeat every 2-3 minutes if needed, alternating nostrils, until medical assistance becomes available., Disp: 2 each, Rfl: 0    nitroglycerin (Nitrostat) 0.4 mg SL tablet, Place 1 tablet (0.4 mg) under the tongue every 5 minutes if needed for chest pain., Disp: 20 tablet, Rfl: 1    pregabalin (Lyrica) 200 mg capsule, Take 1 capsule (200 mg) by mouth once daily at bedtime., Disp: 90 capsule, Rfl: 3    rosuvastatin (Crestor) 20 mg tablet, Take 1 tablet (20 mg) by mouth once daily., Disp: 90 tablet, Rfl: 3    warfarin (Coumadin) 5 mg tablet, Take 1.5 tablets (7.5 mg) by mouth 5 times a week. On Sunday, Monday, Wednesday, Thursday, Saturday evenings, Disp: , Rfl:     warfarin (Coumadin) 5 mg tablet, TAKE 1 AND 1/2 TABLETS BY MOUTH  5 TIMES WEEKLY AND 1 TABLET BY  MOUTH TWICE WEEKLY OR AS  DIRECTED AFTER INR TESTING., Disp: 124 tablet, Rfl: 3    oxyCODONE-acetaminophen (Percocet) 5-325 mg tablet, Take 1 tablet by mouth 3 times a day as needed for severe pain (7 - 10) for up to 28 days., Disp: 84 tablet, Rfl: 0    oxyCODONE-acetaminophen (Percocet) 5-325 mg tablet, Take 1 tablet by mouth 3 times a day as needed for severe pain (7 - 10) for up to 28 days. Do not fill before October 24, 2024., Disp: 84 tablet, Rfl: 0    oxyCODONE-acetaminophen (Percocet) 5-325 mg tablet, Take 1 tablet by mouth 3 times a day as needed for severe pain (7 - 10) for up to 28 days. Do not fill before November 21, 2024., Disp: 84 tablet, Rfl: 0  [2]   Past Surgical History:  Procedure Laterality Date    AORTIC VALVE REPLACEMENT  06/25/2014    Aortic Valve Replacement    CARDIAC CATHETERIZATION  04/23/2018    Cardiac Cath  Procedure Summary    CARDIAC CATHETERIZATION N/A 4/22/2024    Procedure: Left Heart Cath;  Surgeon: Pa Gold MD;  Location: [facility];  Service: Cardiovascular;  Laterality: N/A;    CARDIAC CATHETERIZATION N/A 4/22/2024    Procedure: PCI GURINDER Stent- Coronary;  Surgeon: Pa Gold MD;  Location: [facility];  Service: Cardiovascular;  Laterality: N/A;    COLONOSCOPY  05/16/2013    Complete Colonoscopy    COLONOSCOPY  09/21/2017    Colonoscopy (Fiberoptic)    COLONOSCOPY  07/25/2014    Colonoscopy (Fiberoptic)    CORONARY ARTERY BYPASS GRAFT  04/23/2018    CABG    GALLBLADDER SURGERY  10/22/2013    Gallbladder Surgery    KNEE ARTHROSCOPY W/ DEBRIDEMENT  10/22/2013    Arthroscopy Knee Left    OTHER SURGICAL HISTORY  04/23/2018    History Of Prior Surgery    OTHER SURGICAL HISTORY  10/02/2014    Laser Suite Retina Laser Treatment    OTHER SURGICAL HISTORY  08/03/2022    Uvulopalatopharyngoplasty    OTHER SURGICAL HISTORY  08/03/2022    Tonsillectomy with adenoidectomy    OTHER SURGICAL HISTORY  10/28/2013    Heart Valve Replacement   [3]   Past Medical History:  Diagnosis Date    Abrasion, right lower leg, initial encounter 09/26/2019    Leg abrasion, right, initial encounter    Body mass index (BMI) 37.0-37.9, adult 02/28/2020    BMI 37.0-37.9, adult    Closed fracture of lower end of right radius with routine healing 03/03/2023    Corns and callosities 03/22/2019    Callus of foot    Coronary artery disease     Diverticulosis of intestine, part unspecified, without perforation or abscess without bleeding     Diverticulosis    Hypertension     Other conditions influencing health status 08/30/2016    Bleeding from varicose vein, right    Other forms of angina pectoris     Chronic stable angina    Other hyperlipidemia     Other hyperlipidemia    Other postherpetic nervous system involvement 10/07/2015    Herpes zoster virus infection of face and ear nerves    Pain in right ankle and  joints of right foot 01/15/2020    Acute right ankle pain    Personal history of colonic polyps     History of colonic polyps    Personal history of diseases of the skin and subcutaneous tissue 09/29/2016    History of folliculitis    Personal history of other (healed) physical injury and trauma 08/11/2014    History of sprain of elbow    Personal history of other diseases of the circulatory system     History of aortic valve stenosis    Personal history of other diseases of the musculoskeletal system and connective tissue     History of fibromyalgia    Personal history of other diseases of the musculoskeletal system and connective tissue 07/06/2013    History of low back pain    Personal history of other diseases of the nervous system and sense organs 10/28/2013    History of subconjunctival hemorrhage    Personal history of other diseases of the respiratory system 03/04/2019    History of acute bronchitis    Personal history of other endocrine, nutritional and metabolic disease     History of obesity    Personal history of other endocrine, nutritional and metabolic disease 05/02/2019    History of type 2 diabetes mellitus    Personal history of other endocrine, nutritional and metabolic disease 05/10/2018    History of hyperglycemia    Personal history of other mental and behavioral disorders 04/07/2020    History of depression    Personal history of other specified conditions     History of shortness of breath    Personal history of other specified conditions     History of palpitations    Prediabetes 10/04/2018    Pre-diabetes    Presence of aortocoronary bypass graft     S/P CABG x 1    Presence of prosthetic heart valve     Status post mechanical aortic valve replacement    Rash and other nonspecific skin eruption 01/10/2017    Rash, skin    Spondylosis without myelopathy or radiculopathy, lumbar region 07/06/2013    Lumbar spondylosis    Strain of unspecified muscles, fascia and tendons at thigh level,  unspecified thigh, initial encounter 2013    Muscle strain of thigh    Unspecified asthma, uncomplicated ([facility]) 2019    Asthmatic bronchitis    Unspecified fall, initial encounter 2019    Fall at home, initial encounter   [4]   Social History  Tobacco Use    Smoking status: Former     Current packs/day: 0.00     Types: Cigarettes     Quit date:      Years since quittin.3    Smokeless tobacco: Never   Vaping Use    Vaping status: Never Used   Substance Use Topics    Alcohol use: Never    Drug use: Never   [5]   Family History  Problem Relation Name Age of Onset    Arthritis Mother      Other (Stroke Syndrome) Mother      Arthritis Father      Other (CABG) Father      Sleep apnea Son

## 2025-04-19 LAB
ALBUMIN SERPL-MCNC: 4.6 G/DL (ref 3.6–5.1)
ALP SERPL-CCNC: 102 U/L (ref 35–144)
ALT SERPL-CCNC: 13 U/L (ref 9–46)
ANION GAP SERPL CALCULATED.4IONS-SCNC: 11 MMOL/L (CALC) (ref 7–17)
AST SERPL-CCNC: 19 U/L (ref 10–35)
BILIRUB SERPL-MCNC: 0.5 MG/DL (ref 0.2–1.2)
BUN SERPL-MCNC: 22 MG/DL (ref 7–25)
CALCIUM SERPL-MCNC: 9.6 MG/DL (ref 8.6–10.3)
CHLORIDE SERPL-SCNC: 105 MMOL/L (ref 98–110)
CHOLEST SERPL-MCNC: 197 MG/DL
CHOLEST/HDLC SERPL: 4.8 (CALC)
CO2 SERPL-SCNC: 23 MMOL/L (ref 20–32)
CREAT SERPL-MCNC: 1 MG/DL (ref 0.7–1.28)
CREAT UR-MCNC: 55 MG/DL (ref 20–320)
EGFRCR SERPLBLD CKD-EPI 2021: 79 ML/MIN/1.73M2
GLUCOSE SERPL-MCNC: 146 MG/DL (ref 65–99)
HDLC SERPL-MCNC: 41 MG/DL
LDLC SERPL CALC-MCNC: ABNORMAL MG/DL
NONHDLC SERPL-MCNC: 156 MG/DL (CALC)
POTASSIUM SERPL-SCNC: 4.6 MMOL/L (ref 3.5–5.3)
PROT SERPL-MCNC: 7.4 G/DL (ref 6.1–8.1)
PROT UR-MCNC: 13 MG/DL (ref 5–25)
PROT/CREAT UR: 0.24 MG/MG CREAT (ref 0.03–0.15)
PROT/CREAT UR: 236 MG/G CREAT (ref 25–148)
SODIUM SERPL-SCNC: 139 MMOL/L (ref 135–146)
TRIGL SERPL-MCNC: 611 MG/DL

## 2025-04-24 DIAGNOSIS — M06.4 INFLAMMATORY POLYARTHROPATHY (MULTI): ICD-10-CM

## 2025-04-24 DIAGNOSIS — I25.10 CORONARY ARTERY DISEASE INVOLVING NATIVE CORONARY ARTERY OF NATIVE HEART WITHOUT ANGINA PECTORIS: ICD-10-CM

## 2025-04-24 DIAGNOSIS — E03.9 ACQUIRED HYPOTHYROIDISM: ICD-10-CM

## 2025-04-24 DIAGNOSIS — G60.9 HEREDITARY AND IDIOPATHIC NEUROPATHY: ICD-10-CM

## 2025-04-24 RX ORDER — FOLIC ACID 1 MG/1
1 TABLET ORAL EVERY MORNING
Qty: 90 TABLET | Refills: 3 | Status: SHIPPED | OUTPATIENT
Start: 2025-04-24 | End: 2026-04-24

## 2025-04-24 RX ORDER — LEVOTHYROXINE SODIUM 150 UG/1
150 TABLET ORAL DAILY
Qty: 90 TABLET | Refills: 3 | Status: SHIPPED | OUTPATIENT
Start: 2025-04-24 | End: 2026-04-24

## 2025-04-28 ENCOUNTER — HOSPITAL ENCOUNTER (OUTPATIENT)
Dept: RADIOLOGY | Facility: CLINIC | Age: 75
Discharge: HOME | End: 2025-04-28
Payer: MEDICARE

## 2025-04-28 DIAGNOSIS — M25.512 ACUTE PAIN OF LEFT SHOULDER: ICD-10-CM

## 2025-04-28 DIAGNOSIS — G60.9 HEREDITARY AND IDIOPATHIC NEUROPATHY: ICD-10-CM

## 2025-04-28 PROCEDURE — 73030 X-RAY EXAM OF SHOULDER: CPT | Mod: LT

## 2025-04-28 PROCEDURE — 73030 X-RAY EXAM OF SHOULDER: CPT | Mod: LEFT SIDE | Performed by: RADIOLOGY

## 2025-04-28 RX ORDER — PREGABALIN 200 MG/1
200 CAPSULE ORAL NIGHTLY
Qty: 90 CAPSULE | Refills: 0 | Status: SHIPPED | OUTPATIENT
Start: 2025-04-28

## 2025-04-29 ENCOUNTER — OFFICE VISIT (OUTPATIENT)
Dept: ORTHOPEDIC SURGERY | Facility: CLINIC | Age: 75
End: 2025-04-29
Payer: MEDICARE

## 2025-04-29 DIAGNOSIS — M25.512 ACUTE PAIN OF LEFT SHOULDER: Primary | ICD-10-CM

## 2025-04-29 DIAGNOSIS — M25.812 IMPINGEMENT OF LEFT SHOULDER: ICD-10-CM

## 2025-04-29 PROCEDURE — 4010F ACE/ARB THERAPY RXD/TAKEN: CPT | Performed by: ORTHOPAEDIC SURGERY

## 2025-04-29 PROCEDURE — 1036F TOBACCO NON-USER: CPT | Performed by: ORTHOPAEDIC SURGERY

## 2025-04-29 PROCEDURE — 3044F HG A1C LEVEL LT 7.0%: CPT | Performed by: ORTHOPAEDIC SURGERY

## 2025-04-29 PROCEDURE — 99213 OFFICE O/P EST LOW 20 MIN: CPT | Mod: 25 | Performed by: ORTHOPAEDIC SURGERY

## 2025-04-29 PROCEDURE — 1123F ACP DISCUSS/DSCN MKR DOCD: CPT | Performed by: ORTHOPAEDIC SURGERY

## 2025-04-29 PROCEDURE — 1160F RVW MEDS BY RX/DR IN RCRD: CPT | Performed by: ORTHOPAEDIC SURGERY

## 2025-04-29 PROCEDURE — 99213 OFFICE O/P EST LOW 20 MIN: CPT | Performed by: ORTHOPAEDIC SURGERY

## 2025-04-29 PROCEDURE — 20611 DRAIN/INJ JOINT/BURSA W/US: CPT | Mod: LT | Performed by: ORTHOPAEDIC SURGERY

## 2025-04-29 PROCEDURE — 1159F MED LIST DOCD IN RCRD: CPT | Performed by: ORTHOPAEDIC SURGERY

## 2025-04-29 PROCEDURE — 2500000004 HC RX 250 GENERAL PHARMACY W/ HCPCS (ALT 636 FOR OP/ED): Mod: JZ | Performed by: ORTHOPAEDIC SURGERY

## 2025-04-29 RX ADMIN — BETAMETHASONE DIPROPIONATE 40 MG: 0.5 OINTMENT TOPICAL at 14:08

## 2025-04-29 RX ADMIN — LIDOCAINE HYDROCHLORIDE 3 ML: 10 INJECTION, SOLUTION INFILTRATION; PERINEURAL at 14:08

## 2025-04-29 ASSESSMENT — ENCOUNTER SYMPTOMS
FATIGUE: 0
BRUISES/BLEEDS EASILY: 0
WHEEZING: 0
SHORTNESS OF BREATH: 0
FEVER: 0
CHILLS: 0
ARTHRALGIAS: 1

## 2025-04-29 ASSESSMENT — PAIN - FUNCTIONAL ASSESSMENT: PAIN_FUNCTIONAL_ASSESSMENT: 0-10

## 2025-04-29 ASSESSMENT — PAIN SCALES - GENERAL: PAINLEVEL_OUTOF10: 5 - MODERATE PAIN

## 2025-04-29 NOTE — PROGRESS NOTES
Reason for Appointment  Left shoulder pain    History of Present Illness  Patient is a 74 y.o. male here today for follow-up evaluation of left shoulder pain. We last saw the patient on 3/21/23 for left shoulder and hand pain and we gave a left subacromial injection. X-rays taken of the left shoulder on 4/28/25 were reviewed and showed arthritic change, moderate to severe. Today he states the last injection gave him good long term relief. He is requesting a repeat injection today. His right shoulder is doing great. No recent falls or injuries. No other changes in past medical history, allergies, or medications.    Medical History[1]    Surgical History[2]    Medication Documentation Review Audit       Reviewed by Jean Polanco DO (Physician) on 04/18/25 at 1117      Medication Order Taking? Sig Documenting Provider Last Dose Status   blood glucose control, normal solution [id number] Yes Use as directed Historical Provider, MD Taking Active   blood sugar diagnostic (OneTouch Ultra Test) strip [id number] Yes use to test blood glucose once daily Jean Polanco DO  Active   cetirizine (ZYRTEC) 10 mg capsule [id number] Yes Take 1 capsule (10 mg) by mouth once daily in the morning. Historical Provider, MD Taking Active   cholecalciferol (Vitamin D-3) 50 mcg (2,000 unit) capsule [id number] Yes Take 1 capsule (50 mcg) by mouth early in the morning.. Historical Provider, MD Taking Active   clopidogrel (Plavix) 75 mg tablet [id number] Yes TAKE 1 TABLET BY MOUTH ONCE  DAILY Jose Juan Goins MD  Active    Patient not taking:   Discontinued 04/18/25 1115   FLUoxetine (PROzac) 20 mg capsule [id number] Yes Take 1 capsule (20 mg) by mouth once daily. Jean Polanco DO  Active   fluticasone propion-salmeteroL (Advair Diskus) 250-50 mcg/dose diskus inhaler [id number] Yes Inhale 1 puff 2 times a day. During summer (grass cutting) Karl Mock,  Taking Active   folic acid (Folvite) 1 mg tablet [id number] Yes Take 1 tablet (1 mg) by  mouth once daily in the morning. Jean Polanco DO  Active   furosemide (Lasix) 20 mg tablet [id number] Yes Take 1 tablet (20 mg) by mouth 4 times a week. Take every Mon, Wed, Fri, and Sat Historical Provider, MD Taking Active   HYDROcodone-acetaminophen (Norco) 5-325 mg tablet [id number] Yes Take 1 tablet by mouth every 6 hours if needed for severe pain (7 - 10) for up to 28 days. Do not fill before April 7, 2025. Brandon Irizarry MD  Active   HYDROcodone-acetaminophen (Norco) 5-325 mg tablet [id number] Yes Take 1 tablet by mouth every 6 hours if needed for severe pain (7 - 10) for up to 28 days. Do not fill before May 5, 2025. Brandon Irizarry MD  Active   HYDROcodone-acetaminophen (Norco) 5-325 mg tablet [id number] Yes Take 1 tablet by mouth every 6 hours if needed for severe pain (7 - 10) for up to 28 days. Do not fill before June 2, 2025. Brandon Irizarry MD  Active   Jardiance 25 mg [id number] Yes TAKE 1 TABLET BY MOUTH DAILY Karl Mock,  Taking Active   krill-om3-dha-epa-om6-lip-astx (Krill Oil, Omega 3 and 6,) 1,500-165-67.5 mg capsule [id number] Yes Take 1 capsule by mouth once daily. Historical Provider, MD Taking Active   lancets (OneTouch Delica Plus Lancet) 33 gauge misc [id number] Yes USE 1 LANCET PER CUTANEOUS ROUTE TO TEST BLOOD SUGAR ONCE DAILY Jean Polanco DO  Active    Patient not taking:   Discontinued 04/18/25 1115   levothyroxine (Synthroid, Levoxyl) 150 mcg tablet [id number] Yes Take 1 tablet (150 mcg) by mouth early in the morning.. Take on an empty stomach at the same time each day, either 30 to 60 minutes prior to breakfast Jean Polanco DO  Active   losartan (Cozaar) 100 mg tablet [id number] Yes TAKE 1 TABLET BY MOUTH ONCE  DAILY Jean Polanco DO  Active   metFORMIN (Glucophage) 850 mg tablet [id number] Yes TAKE 1 TABLET BY MOUTH TWICE  DAILY WITH MEALS Jean Polanco DO  Active   multivit-min/FA/lycopen/lutein (CENTRUM SILVER MEN ORAL) [id number] Yes Take 1 tablet by mouth  once daily. Historical Provider, MD Taking Active   naloxone (Narcan) 4 mg/0.1 mL nasal spray [id number] Yes Administer 1 spray (4 mg) into affected nostril(s) if needed for opioid reversal or respiratory depression. May repeat every 2-3 minutes if needed, alternating nostrils, until medical assistance becomes available. MYRA Spangler-CNP  Active   nitroglycerin (Nitrostat) 0.4 mg SL tablet [id number] Yes Place 1 tablet (0.4 mg) under the tongue every 5 minutes if needed for chest pain. Jean Polanco DO Taking Active   oxyCODONE-acetaminophen (Percocet) 5-325 mg tablet [id number]  Take 1 tablet by mouth 3 times a day as needed for severe pain (7 - 10) for up to 28 days. GARCIA Spangler   24 2359   oxyCODONE-acetaminophen (Percocet) 5-325 mg tablet [id number]  Take 1 tablet by mouth 3 times a day as needed for severe pain (7 - 10) for up to 28 days. Do not fill before 2024. GARCIA Spangler   24 2359   oxyCODONE-acetaminophen (Percocet) 5-325 mg tablet [id number]  Take 1 tablet by mouth 3 times a day as needed for severe pain (7 - 10) for up to 28 days. Do not fill before 2024. MYRA Spangler-CNP   24 2359   pregabalin (Lyrica) 200 mg capsule [id number] Yes Take 1 capsule (200 mg) by mouth once daily at bedtime. Jean Polanco DO  Active   rosuvastatin (Crestor) 20 mg tablet [id number] Yes Take 1 tablet (20 mg) by mouth once daily. Jose Juan Goins MD Taking Active   warfarin (Coumadin) 5 mg tablet [id number] Yes Take 1.5 tablets (7.5 mg) by mouth 5 times a week. On , Monday, Wednesday, Thursday, Saturday evenings Historical Provider, MD Taking Active   warfarin (Coumadin) 5 mg tablet [id number] Yes TAKE 1 AND 1/2 TABLETS BY MOUTH  5 TIMES WEEKLY AND 1 TABLET BY  MOUTH TWICE WEEKLY OR AS  DIRECTED AFTER INR TESTING. Jean Polanco, DO  Active                    RX Allergies[3]    Review of Systems   Constitutional:   Negative for chills, fatigue and fever.   Respiratory:  Negative for shortness of breath and wheezing.    Cardiovascular:  Negative for chest pain and leg swelling.   Musculoskeletal:  Positive for arthralgias.   Allergic/Immunologic: Negative for immunocompromised state.   Hematological:  Does not bruise/bleed easily.       Exam   Difficult to get the arm Above 130. Cuff strength maintained. Markedly positive impingement sign. Good deltoid function. Good pulses and sensation.   Assessment   Encounter Diagnosis   Name Primary?    Acute pain of left shoulder Yes   Left shoulder impingement    Plan     Today we discussed conservative treatment. At this point, the patient is experiencing increased left shoulder pain that is consistent with impingement syndrome on clinical examination. We will do one cortisone injection into the left subacromial space in hopes to calm their symptoms nicely. Pt understands the small risk of infection and warning signs including flare reaction. The patient can follow up with us if or when pain reoccurs.     Patient ID: Shabbir Laurent is a 74 y.o. male.    L Inj/Asp: L subacromial bursa on 4/29/2025 2:08 PM  Indications: pain  Details: 22 G needle, ultrasound-guided  Medications: 40 mg triamcinolone acetonide 40 mg/mL; 3 mL lidocaine 10 mg/mL (1 %)  Outcome: tolerated well, no immediate complications    After discussing the risks and benefits of the procedure with proceeded with an injection.  Using ultrasound guidance we identified the acromion, humeral head and the subacromial bursa, images obtained and saved. We then sterilely injected the left subacrominal space with a mixture of 40 mg of Kenalog and 1 cc of 1 % lidocaine. Pt tolerated the procedure well without any adverse reactions.    Procedure, treatment alternatives, risks and benefits explained, specific risks discussed. Consent was given by the patient. Immediately prior to procedure a time out was called to verify the correct  patient, procedure, equipment, support staff and site/side marked as required. Patient was prepped and draped in the usual sterile fashion.           I, Gisel Albert, attest that this documentation has been prepared under the direction and in the presence of Fermin Napier MD.   By signing below, I, Fermin Napier MD, personally performed the services described in this documentation. All medical record entries made by the scribe were at my direction and in my presence. I have reviewed the chart and agree that the record reflects my personal performance and is accurate and complete.         [1]   Past Medical History:  Diagnosis Date    Abrasion, right lower leg, initial encounter 09/26/2019    Leg abrasion, right, initial encounter    Body mass index (BMI) 37.0-37.9, adult 02/28/2020    BMI 37.0-37.9, adult    Closed fracture of lower end of right radius with routine healing 03/03/2023    Corns and callosities 03/22/2019    Callus of foot    Coronary artery disease     Diverticulosis of intestine, part unspecified, without perforation or abscess without bleeding     Diverticulosis    Hypertension     Other conditions influencing health status 08/30/2016    Bleeding from varicose vein, right    Other forms of angina pectoris     Chronic stable angina    Other hyperlipidemia     Other hyperlipidemia    Other postherpetic nervous system involvement 10/07/2015    Herpes zoster virus infection of face and ear nerves    Pain in right ankle and joints of right foot 01/15/2020    Acute right ankle pain    Personal history of colonic polyps     History of colonic polyps    Personal history of diseases of the skin and subcutaneous tissue 09/29/2016    History of folliculitis    Personal history of other (healed) physical injury and trauma 08/11/2014    History of sprain of elbow    Personal history of other diseases of the circulatory system     History of aortic valve stenosis    Personal history of other diseases of the  musculoskeletal system and connective tissue     History of fibromyalgia    Personal history of other diseases of the musculoskeletal system and connective tissue 07/06/2013    History of low back pain    Personal history of other diseases of the nervous system and sense organs 10/28/2013    History of subconjunctival hemorrhage    Personal history of other diseases of the respiratory system 03/04/2019    History of acute bronchitis    Personal history of other endocrine, nutritional and metabolic disease     History of obesity    Personal history of other endocrine, nutritional and metabolic disease 05/02/2019    History of type 2 diabetes mellitus    Personal history of other endocrine, nutritional and metabolic disease 05/10/2018    History of hyperglycemia    Personal history of other mental and behavioral disorders 04/07/2020    History of depression    Personal history of other specified conditions     History of shortness of breath    Personal history of other specified conditions     History of palpitations    Prediabetes 10/04/2018    Pre-diabetes    Presence of aortocoronary bypass graft     S/P CABG x 1    Presence of prosthetic heart valve     Status post mechanical aortic valve replacement    Rash and other nonspecific skin eruption 01/10/2017    Rash, skin    Spondylosis without myelopathy or radiculopathy, lumbar region 07/06/2013    Lumbar spondylosis    Strain of unspecified muscles, fascia and tendons at thigh level, unspecified thigh, initial encounter 12/05/2013    Muscle strain of thigh    Unspecified asthma, uncomplicated ([facility]) 03/18/2019    Asthmatic bronchitis    Unspecified fall, initial encounter 09/26/2019    Fall at home, initial encounter   [2]   Past Surgical History:  Procedure Laterality Date    AORTIC VALVE REPLACEMENT  06/25/2014    Aortic Valve Replacement    CARDIAC CATHETERIZATION  04/23/2018    Cardiac Cath Procedure Summary    CARDIAC CATHETERIZATION N/A 4/22/2024     Procedure: Left Heart Cath;  Surgeon: Pa Gold MD;  Location: [facility] Cardiac Cath Lab;  Service: Cardiovascular;  Laterality: N/A;    CARDIAC CATHETERIZATION N/A 4/22/2024    Procedure: PCI GURINDER Stent- Coronary;  Surgeon: Pa Gold MD;  Location: [facility] Cardiac Cath Lab;  Service: Cardiovascular;  Laterality: N/A;    COLONOSCOPY  05/16/2013    Complete Colonoscopy    COLONOSCOPY  09/21/2017    Colonoscopy (Fiberoptic)    COLONOSCOPY  07/25/2014    Colonoscopy (Fiberoptic)    CORONARY ARTERY BYPASS GRAFT  04/23/2018    CABG    GALLBLADDER SURGERY  10/22/2013    Gallbladder Surgery    KNEE ARTHROSCOPY W/ DEBRIDEMENT  10/22/2013    Arthroscopy Knee Left    OTHER SURGICAL HISTORY  04/23/2018    History Of Prior Surgery    OTHER SURGICAL HISTORY  10/02/2014    Laser Suite Retina Laser Treatment    OTHER SURGICAL HISTORY  08/03/2022    Uvulopalatopharyngoplasty    OTHER SURGICAL HISTORY  08/03/2022    Tonsillectomy with adenoidectomy    OTHER SURGICAL HISTORY  10/28/2013    Heart Valve Replacement   [3]   Allergies  Allergen Reactions    Hydrochlorothiazide Other     BP drops too low and passes out    Phenylpropanolamine Hives     Patient states he doesn't know what this is    Amiloride-Hydrochlorothiazide Unknown    Ceramide 3b Unknown     Patient states he doesn't know what this is    Chlorpheniramine-Phenylpropan Unknown     Patient states he doesn't know what this is

## 2025-04-30 RX ORDER — LIDOCAINE HYDROCHLORIDE 10 MG/ML
3 INJECTION, SOLUTION INFILTRATION; PERINEURAL
Status: COMPLETED | OUTPATIENT
Start: 2025-04-29 | End: 2025-04-29

## 2025-04-30 RX ORDER — TRIAMCINOLONE ACETONIDE 40 MG/ML
40 INJECTION, SUSPENSION INTRA-ARTICULAR; INTRAMUSCULAR
Status: COMPLETED | OUTPATIENT
Start: 2025-04-29 | End: 2025-04-29

## 2025-05-01 ENCOUNTER — CLINICAL SUPPORT (OUTPATIENT)
Dept: PRIMARY CARE | Facility: CLINIC | Age: 75
End: 2025-05-01
Payer: MEDICARE

## 2025-05-01 DIAGNOSIS — E11.42 TYPE 2 DIABETES MELLITUS WITH DIABETIC POLYNEUROPATHY, WITHOUT LONG-TERM CURRENT USE OF INSULIN: ICD-10-CM

## 2025-05-01 LAB
POC INR: 2.3 (ref 0.9–1.1)
POC PROTHROMBIN TIME: ABNORMAL (ref 9.3–12.5)

## 2025-05-01 PROCEDURE — 85610 PROTHROMBIN TIME: CPT | Performed by: FAMILY MEDICINE

## 2025-05-02 ENCOUNTER — APPOINTMENT (OUTPATIENT)
Dept: PRIMARY CARE | Facility: CLINIC | Age: 75
End: 2025-05-02
Payer: MEDICARE

## 2025-05-05 ENCOUNTER — HOSPITAL ENCOUNTER (OUTPATIENT)
Dept: CARDIOLOGY | Facility: CLINIC | Age: 75
Discharge: HOME | End: 2025-05-05
Payer: MEDICARE

## 2025-05-05 ENCOUNTER — OFFICE VISIT (OUTPATIENT)
Dept: CARDIOLOGY | Facility: CLINIC | Age: 75
End: 2025-05-05
Payer: MEDICARE

## 2025-05-05 VITALS
HEIGHT: 72 IN | BODY MASS INDEX: 37.25 KG/M2 | WEIGHT: 275 LBS | SYSTOLIC BLOOD PRESSURE: 132 MMHG | DIASTOLIC BLOOD PRESSURE: 70 MMHG

## 2025-05-05 VITALS
DIASTOLIC BLOOD PRESSURE: 82 MMHG | HEART RATE: 63 BPM | BODY MASS INDEX: 36.57 KG/M2 | SYSTOLIC BLOOD PRESSURE: 126 MMHG | HEIGHT: 72 IN | OXYGEN SATURATION: 97 % | WEIGHT: 270 LBS

## 2025-05-05 DIAGNOSIS — E78.5 HYPERLIPIDEMIA, UNSPECIFIED HYPERLIPIDEMIA TYPE: ICD-10-CM

## 2025-05-05 DIAGNOSIS — I21.4 NSTEMI (NON-ST ELEVATED MYOCARDIAL INFARCTION) (MULTI): ICD-10-CM

## 2025-05-05 DIAGNOSIS — R06.09 EXERTIONAL DYSPNEA: ICD-10-CM

## 2025-05-05 DIAGNOSIS — I50.32 CHRONIC DIASTOLIC CONGESTIVE HEART FAILURE: ICD-10-CM

## 2025-05-05 DIAGNOSIS — R00.1 BRADYCARDIA: ICD-10-CM

## 2025-05-05 DIAGNOSIS — I50.22 CHRONIC SYSTOLIC (CONGESTIVE) HEART FAILURE: ICD-10-CM

## 2025-05-05 DIAGNOSIS — I10 BENIGN ESSENTIAL HYPERTENSION: Primary | ICD-10-CM

## 2025-05-05 DIAGNOSIS — I25.10 CORONARY ARTERY DISEASE INVOLVING NATIVE CORONARY ARTERY OF NATIVE HEART WITHOUT ANGINA PECTORIS: ICD-10-CM

## 2025-05-05 DIAGNOSIS — Z95.1 HISTORY OF CORONARY ARTERY BYPASS GRAFT X 1: ICD-10-CM

## 2025-05-05 DIAGNOSIS — Z95.2 HISTORY OF HEART VALVE REPLACEMENT WITH MECHANICAL VALVE: ICD-10-CM

## 2025-05-05 DIAGNOSIS — E78.2 MIXED HYPERLIPIDEMIA: ICD-10-CM

## 2025-05-05 DIAGNOSIS — R42 ORTHOSTATIC DIZZINESS: ICD-10-CM

## 2025-05-05 PROCEDURE — 3079F DIAST BP 80-89 MM HG: CPT | Performed by: INTERNAL MEDICINE

## 2025-05-05 PROCEDURE — 1123F ACP DISCUSS/DSCN MKR DOCD: CPT | Performed by: INTERNAL MEDICINE

## 2025-05-05 PROCEDURE — 3044F HG A1C LEVEL LT 7.0%: CPT | Performed by: INTERNAL MEDICINE

## 2025-05-05 PROCEDURE — 99213 OFFICE O/P EST LOW 20 MIN: CPT | Mod: 25 | Performed by: INTERNAL MEDICINE

## 2025-05-05 PROCEDURE — 93306 TTE W/DOPPLER COMPLETE: CPT | Performed by: INTERNAL MEDICINE

## 2025-05-05 PROCEDURE — 93306 TTE W/DOPPLER COMPLETE: CPT

## 2025-05-05 PROCEDURE — 99214 OFFICE O/P EST MOD 30 MIN: CPT | Performed by: INTERNAL MEDICINE

## 2025-05-05 PROCEDURE — 3074F SYST BP LT 130 MM HG: CPT | Performed by: INTERNAL MEDICINE

## 2025-05-05 PROCEDURE — 1036F TOBACCO NON-USER: CPT | Performed by: INTERNAL MEDICINE

## 2025-05-05 PROCEDURE — 1159F MED LIST DOCD IN RCRD: CPT | Performed by: INTERNAL MEDICINE

## 2025-05-05 PROCEDURE — 4010F ACE/ARB THERAPY RXD/TAKEN: CPT | Performed by: INTERNAL MEDICINE

## 2025-05-05 PROCEDURE — 3008F BODY MASS INDEX DOCD: CPT | Performed by: INTERNAL MEDICINE

## 2025-05-05 RX ORDER — CLOPIDOGREL BISULFATE 75 MG/1
75 TABLET ORAL DAILY
Qty: 90 TABLET | Refills: 3 | Status: SHIPPED | OUTPATIENT
Start: 2025-05-05

## 2025-05-05 RX ORDER — AMLODIPINE BESYLATE 5 MG/1
5 TABLET ORAL DAILY
COMMUNITY
End: 2025-05-05 | Stop reason: SDUPTHER

## 2025-05-05 RX ORDER — ROSUVASTATIN CALCIUM 20 MG/1
20 TABLET, COATED ORAL DAILY
Qty: 90 TABLET | Refills: 3 | Status: SHIPPED | OUTPATIENT
Start: 2025-05-05 | End: 2026-05-05

## 2025-05-05 RX ORDER — AMLODIPINE BESYLATE 5 MG/1
5 TABLET ORAL DAILY
Qty: 90 TABLET | Refills: 3 | Status: SHIPPED | OUTPATIENT
Start: 2025-05-05 | End: 2026-05-05

## 2025-05-05 NOTE — PATIENT INSTRUCTIONS
Start amlodipine 5 mg daily in addition to your other medications.    Your echocardiogram shows normal heart valve problem.  The prosthetic aortic valve is functioning normally

## 2025-05-05 NOTE — PROGRESS NOTES
Subjective   Shabbir Laurent is a 74 y.o. male.    Chief Complaint:  Follow-up coronary disease, aortic valve replacement.    HPI    Over the past 6 months his primary problems are orthopedic in nature.  Has pains in the left shoulder hips and knees.  This limits his activity levels.  No typical anginal symptoms.  Continues to struggle with weight and diabetes control.    Cardiac catheterization performed on 4/22/2024 demonstrated a circumflex stenosis at the bifurcation.  He also had a proximal left anterior descending coronary artery stenosis.  He underwent angioplasty and stenting of these vessels.  2.5 mm balloons were used in the circumflex lesion a 3.0 x 18 mm stent was placed in the proximal left anterior descending coronary artery.  There was an excellent angiographic outcome.     His cardiac history is significant for the presence of coronary artery disease and valvular heart disease. He is status post aortic valve replacement and single-vessel coronary artery bypass grafting with a vein graft to the right coronary artery. This was performed on December 19, 2003. The valve is a mechanical aortic valve.     His most recent cardiac catheterization was performed on June 27, 2012. This demonstrated 95% left anterior descending treated with balloon angioplasty and stent placement, 80% circumflex treated with balloon angioplasty and stent placement and a distal 95% right coronary artery supplying a small posterolateral branch. The vein graft to the distal right coronary artery was occluded.     He has a history of ascending aortic aneurysm repair.     Other medical problems include hypertension, hyperlipidemia and severe degenerative arthritis.      Allergies  Medication    · hydroCHLOROthiazide TABS   · Ceramide III REYMUNDO   · colchicine     Family History  Mother    · Family history of Stroke Syndrome (V17.1)  Father    · FH: CABG (coronary artery bypass surgery) (V17.3) (Z82.49)  Social History  Problems    ·  Alcohol Use (History)   · Does not use illicit drugs (V49.89) (Z78.9)   ·    · Never a smoker     Review of Systems   Constitutional: Positive for malaise/fatigue.   Cardiovascular:  Positive for dyspnea on exertion.   Musculoskeletal:  Positive for arthritis, back pain and joint pain.      Current Medications[1]     Visit Vitals  /88 (BP Location: Right arm)   Pulse 63   Ht 1.829 m (6')   Wt 122 kg (270 lb)   SpO2 97%   BMI 36.62 kg/m²   Smoking Status Former   BSA 2.49 m²        Objective     Constitutional:       Appearance: Not in distress.   Neck:      Vascular: JVD normal.   Pulmonary:      Breath sounds: Normal breath sounds.   Cardiovascular:      Normal rate. Regular rhythm. S1 with normal intensity. S2 with normal intensity.       Murmurs: There is a grade 2/6 systolic murmur.      No gallop.    Pulses:     Intact distal pulses.   Edema:     Peripheral edema absent.   Abdominal:      General: Bowel sounds are normal.   Neurological:      Mental Status: Alert and oriented to person, place and time.         Assessment:    1.  Coronary disease.  Most recent intervention was in April 2024.  Underwent complex balloon angioplasty involving the circumflex coronary artery.  Also involving the proximal left anterior descending coronary artery.  No anginal symptoms.  Activities however are somewhat limited.    2.  Hypertension.  Blood pressure is reasonably well-controlled.    3.  Status post aortic valve replacement.  Today's echocardiographic study shows a normally functioning mechanical aortic valve.    4.  Hyperlipidemia.  Latest LDL was 29.  Latest labs show very elevated triglyceride level.  However his diabetes is not under good control at this time.         [1]   Current Outpatient Medications   Medication Sig Dispense Refill    blood glucose control, normal solution Use as directed      blood sugar diagnostic (OneTouch Ultra Test) strip use to test blood glucose once daily 100 strip 2     cetirizine (ZYRTEC) 10 mg capsule Take 1 capsule (10 mg) by mouth once daily in the morning.      cholecalciferol (Vitamin D-3) 50 mcg (2,000 unit) capsule Take 1 capsule (50 mcg) by mouth early in the morning..      clopidogrel (Plavix) 75 mg tablet TAKE 1 TABLET BY MOUTH ONCE  DAILY 90 tablet 3    FLUoxetine (PROzac) 20 mg capsule Take 1 capsule (20 mg) by mouth once daily. 90 capsule 3    fluticasone propion-salmeteroL (Advair Diskus) 250-50 mcg/dose diskus inhaler Inhale 1 puff 2 times a day. During summer (grass cutting) 180 each 3    folic acid (Folvite) 1 mg tablet Take 1 tablet (1 mg) by mouth once daily in the morning. 90 tablet 3    furosemide (Lasix) 20 mg tablet Take 1 tablet (20 mg) by mouth 4 times a week. Take every Mon, Wed, Fri, and Sat      HYDROcodone-acetaminophen (Norco) 5-325 mg tablet Take 1 tablet by mouth every 6 hours if needed for severe pain (7 - 10) for up to 28 days. Do not fill before April 7, 2025. 84 tablet 0    HYDROcodone-acetaminophen (Norco) 5-325 mg tablet Take 1 tablet by mouth every 6 hours if needed for severe pain (7 - 10) for up to 28 days. Do not fill before May 5, 2025. 84 tablet 0    [START ON 6/2/2025] HYDROcodone-acetaminophen (Norco) 5-325 mg tablet Take 1 tablet by mouth every 6 hours if needed for severe pain (7 - 10) for up to 28 days. Do not fill before June 2, 2025. 84 tablet 0    Jardiance 25 mg TAKE 1 TABLET BY MOUTH DAILY 90 tablet 3    krill-om3-dha-epa-om6-lip-astx (Krill Oil, Omega 3 and 6,) 1,500-165-67.5 mg capsule Take 1 capsule by mouth once daily.      lancets (OneTouch Delica Plus Lancet) 33 gauge misc USE 1 LANCET PER CUTANEOUS ROUTE TO TEST BLOOD SUGAR ONCE DAILY 100 each 2    levothyroxine (Synthroid, Levoxyl) 150 mcg tablet Take 1 tablet (150 mcg) by mouth early in the morning.. Take on an empty stomach at the same time each day, either 30 to 60 minutes prior to breakfast 90 tablet 3    losartan (Cozaar) 100 mg tablet TAKE 1 TABLET BY MOUTH ONCE   DAILY 90 tablet 3    metFORMIN (Glucophage) 850 mg tablet TAKE 1 TABLET BY MOUTH TWICE  DAILY WITH MEALS 180 tablet 3    multivit-min/FA/lycopen/lutein (CENTRUM SILVER MEN ORAL) Take 1 tablet by mouth once daily.      naloxone (Narcan) 4 mg/0.1 mL nasal spray Administer 1 spray (4 mg) into affected nostril(s) if needed for opioid reversal or respiratory depression. May repeat every 2-3 minutes if needed, alternating nostrils, until medical assistance becomes available. 2 each 0    nitroglycerin (Nitrostat) 0.4 mg SL tablet Place 1 tablet (0.4 mg) under the tongue every 5 minutes if needed for chest pain. 20 tablet 1    pregabalin (Lyrica) 200 mg capsule Take 1 capsule by mouth at bedtime 90 capsule 0    rosuvastatin (Crestor) 20 mg tablet Take 1 tablet (20 mg) by mouth once daily. 90 tablet 3    warfarin (Coumadin) 5 mg tablet Take 1.5 tablets (7.5 mg) by mouth 5 times a week. On Sunday, Monday, Wednesday, Thursday, Saturday evenings      warfarin (Coumadin) 5 mg tablet TAKE 1 AND 1/2 TABLETS BY MOUTH  5 TIMES WEEKLY AND 1 TABLET BY  MOUTH TWICE WEEKLY OR AS  DIRECTED AFTER INR TESTING. 124 tablet 3    amLODIPine (Norvasc) 5 mg tablet Take 1 tablet (5 mg) by mouth once daily.      oxyCODONE-acetaminophen (Percocet) 5-325 mg tablet Take 1 tablet by mouth 3 times a day as needed for severe pain (7 - 10) for up to 28 days. 84 tablet 0    oxyCODONE-acetaminophen (Percocet) 5-325 mg tablet Take 1 tablet by mouth 3 times a day as needed for severe pain (7 - 10) for up to 28 days. Do not fill before October 24, 2024. 84 tablet 0    oxyCODONE-acetaminophen (Percocet) 5-325 mg tablet Take 1 tablet by mouth 3 times a day as needed for severe pain (7 - 10) for up to 28 days. Do not fill before November 21, 2024. 84 tablet 0     No current facility-administered medications for this visit.

## 2025-05-06 LAB
AORTIC VALVE MEAN GRADIENT: 18 MMHG
AORTIC VALVE PEAK VELOCITY: 2.92 M/S
AV PEAK GRADIENT: 34 MMHG
EJECTION FRACTION APICAL 4 CHAMBER: 45.9
EJECTION FRACTION: 58 %
LEFT ATRIUM VOLUME AREA LENGTH INDEX BSA: 24.6 ML/M2
LEFT VENTRICLE INTERNAL DIMENSION DIASTOLE: 4.49 CM (ref 3.5–6)
MITRAL VALVE E/A RATIO: 0.72
RIGHT VENTRICLE FREE WALL PEAK S': 0.6 CM/S
TRICUSPID ANNULAR PLANE SYSTOLIC EXCURSION: 1.4 CM

## 2025-05-13 DIAGNOSIS — I50.32 CHRONIC DIASTOLIC CONGESTIVE HEART FAILURE: Primary | ICD-10-CM

## 2025-05-13 RX ORDER — FUROSEMIDE 20 MG/1
20 TABLET ORAL
Qty: 90 TABLET | Refills: 2 | Status: SHIPPED | OUTPATIENT
Start: 2025-05-13

## 2025-05-14 ENCOUNTER — APPOINTMENT (OUTPATIENT)
Dept: PRIMARY CARE | Facility: CLINIC | Age: 75
End: 2025-05-14
Payer: MEDICARE

## 2025-05-14 DIAGNOSIS — Z95.2 MECHANICAL HEART VALVE PRESENT: Primary | ICD-10-CM

## 2025-05-14 LAB
POC INR: 3 (ref 2.5–3.5)
POC PROTHROMBIN TIME: 35.7 (ref 9.3–12.5)

## 2025-05-14 PROCEDURE — 85610 PROTHROMBIN TIME: CPT | Performed by: FAMILY MEDICINE

## 2025-05-28 ENCOUNTER — APPOINTMENT (OUTPATIENT)
Dept: PRIMARY CARE | Facility: CLINIC | Age: 75
End: 2025-05-28
Payer: MEDICARE

## 2025-05-28 DIAGNOSIS — Z95.2 MECHANICAL HEART VALVE PRESENT: ICD-10-CM

## 2025-05-28 LAB
POC INR: 3.2 (ref 2.5–3.5)
POC PROTHROMBIN TIME: ABNORMAL (ref 9.3–12.5)

## 2025-05-28 PROCEDURE — 85610 PROTHROMBIN TIME: CPT | Performed by: FAMILY MEDICINE

## 2025-05-28 PROCEDURE — 99211 OFF/OP EST MAY X REQ PHY/QHP: CPT | Performed by: FAMILY MEDICINE

## 2025-06-16 ENCOUNTER — OFFICE VISIT (OUTPATIENT)
Dept: PAIN MEDICINE | Facility: CLINIC | Age: 75
End: 2025-06-16
Payer: MEDICARE

## 2025-06-16 VITALS
SYSTOLIC BLOOD PRESSURE: 151 MMHG | DIASTOLIC BLOOD PRESSURE: 73 MMHG | HEART RATE: 68 BPM | RESPIRATION RATE: 18 BRPM | OXYGEN SATURATION: 96 %

## 2025-06-16 DIAGNOSIS — M51.16 DISPLACEMENT OF LUMBAR DISC WITH RADICULOPATHY: ICD-10-CM

## 2025-06-16 DIAGNOSIS — M16.11 ARTHRITIS OF RIGHT HIP: ICD-10-CM

## 2025-06-16 PROCEDURE — G2211 COMPLEX E/M VISIT ADD ON: HCPCS | Performed by: ANESTHESIOLOGY

## 2025-06-16 PROCEDURE — 3077F SYST BP >= 140 MM HG: CPT | Performed by: ANESTHESIOLOGY

## 2025-06-16 PROCEDURE — 99214 OFFICE O/P EST MOD 30 MIN: CPT | Performed by: ANESTHESIOLOGY

## 2025-06-16 PROCEDURE — 1159F MED LIST DOCD IN RCRD: CPT | Performed by: ANESTHESIOLOGY

## 2025-06-16 PROCEDURE — 3078F DIAST BP <80 MM HG: CPT | Performed by: ANESTHESIOLOGY

## 2025-06-16 PROCEDURE — 4010F ACE/ARB THERAPY RXD/TAKEN: CPT | Performed by: ANESTHESIOLOGY

## 2025-06-16 PROCEDURE — 3044F HG A1C LEVEL LT 7.0%: CPT | Performed by: ANESTHESIOLOGY

## 2025-06-16 RX ORDER — HYDROCODONE BITARTRATE AND ACETAMINOPHEN 5; 325 MG/1; MG/1
1 TABLET ORAL 3 TIMES DAILY PRN
Qty: 84 TABLET | Refills: 0 | Status: SHIPPED | OUTPATIENT
Start: 2025-08-25 | End: 2025-09-22

## 2025-06-16 RX ORDER — HYDROCODONE BITARTRATE AND ACETAMINOPHEN 5; 325 MG/1; MG/1
1 TABLET ORAL 3 TIMES DAILY PRN
Qty: 84 TABLET | Refills: 0 | Status: SHIPPED | OUTPATIENT
Start: 2025-07-28 | End: 2025-08-25

## 2025-06-16 RX ORDER — HYDROCODONE BITARTRATE AND ACETAMINOPHEN 5; 325 MG/1; MG/1
1 TABLET ORAL 3 TIMES DAILY PRN
Qty: 84 TABLET | Refills: 0 | Status: SHIPPED | OUTPATIENT
Start: 2025-06-30 | End: 2025-07-28

## 2025-06-16 ASSESSMENT — PAIN DESCRIPTION - DESCRIPTORS: DESCRIPTORS: ACHING

## 2025-06-16 ASSESSMENT — PAIN - FUNCTIONAL ASSESSMENT: PAIN_FUNCTIONAL_ASSESSMENT: 0-10

## 2025-06-16 ASSESSMENT — PAIN SCALES - GENERAL: PAINLEVEL_OUTOF10: 5 - MODERATE PAIN

## 2025-06-16 NOTE — PROGRESS NOTES
History Of Present IllnessShabbir Laurent is a 73 y.o. male with a past medical history of coronary artery disease, mechanical aortic valve on anticoagulation, AUBREY, diastolic heart failure presents to pain clinic for management of low back pain.  Patient has had sustained lasting relief with bilateral L3 transforaminal injection (>90%, ongoing from 2/6/25) currently pain is well-controlled.  Current pain medications include Lyrica 200 mg at nighttime and norco 5 mg q6h as needed  PDMP reviewed this visit with no violations.    Patient is presenting for refill for his medications  Past Medical History  He has a past medical history of Abrasion, right lower leg, initial encounter (09/26/2019), Body mass index (BMI) 37.0-37.9, adult (02/28/2020), Closed fracture of lower end of right radius with routine healing (03/03/2023), Corns and callosities (03/22/2019), Coronary artery disease, Diverticulosis of intestine, part unspecified, without perforation or abscess without bleeding, Hypertension, Other conditions influencing health status (08/30/2016), Other forms of angina pectoris, Other hyperlipidemia, Other postherpetic nervous system involvement (10/07/2015), Pain in right ankle and joints of right foot (01/15/2020), Personal history of colonic polyps, Personal history of diseases of the skin and subcutaneous tissue (09/29/2016), Personal history of other (healed) physical injury and trauma (08/11/2014), Personal history of other diseases of the circulatory system, Personal history of other diseases of the musculoskeletal system and connective tissue, Personal history of other diseases of the musculoskeletal system and connective tissue (07/06/2013), Personal history of other diseases of the nervous system and sense organs (10/28/2013), Personal history of other diseases of the respiratory system (03/04/2019), Personal history of other endocrine, nutritional and metabolic disease, Personal history of other endocrine,  nutritional and metabolic disease (05/02/2019), Personal history of other endocrine, nutritional and metabolic disease (05/10/2018), Personal history of other mental and behavioral disorders (04/07/2020), Personal history of other specified conditions, Personal history of other specified conditions, Prediabetes (10/04/2018), Presence of aortocoronary bypass graft, Presence of prosthetic heart valve, Rash and other nonspecific skin eruption (01/10/2017), Spondylosis without myelopathy or radiculopathy, lumbar region (07/06/2013), Strain of unspecified muscles, fascia and tendons at thigh level, unspecified thigh, initial encounter (12/05/2013), Unspecified asthma, uncomplicated ([facility]) (03/18/2019), and Unspecified fall, initial encounter (09/26/2019).    Surgical History  He has a past surgical history that includes Knee arthroscopy w/ debridement (10/22/2013); Gallbladder surgery (10/22/2013); Colonoscopy (05/16/2013); Coronary artery bypass graft (04/23/2018); Other surgical history (04/23/2018); Colonoscopy (09/21/2017); Cardiac catheterization (04/23/2018); Colonoscopy (07/25/2014); Other surgical history (10/02/2014); Aortic valve replacement (06/25/2014); Other surgical history (08/03/2022); Other surgical history (08/03/2022); Other surgical history (10/28/2013); Cardiac catheterization (N/A, 4/22/2024); and Cardiac catheterization (N/A, 4/22/2024).     Social History  He reports that he quit smoking about 55 years ago. His smoking use included cigarettes. He has never used smokeless tobacco. He reports that he does not drink alcohol and does not use drugs.    Family History  Family History[1]     Allergies  Hydrochlorothiazide, Phenylpropanolamine, Amiloride-hydrochlorothiazide, Ceramide 3b, and Chlorpheniramine-phenylpropan    Review of systems:   13-point review of systems done and negative except as noted in HPI      Physical Exam   Visit Vitals  /73   Pulse 68   Resp 18        Vital signs:  Reviewed  MSK:   No asymmetry or masses noted of the musculature.   Examination of the muscles/joints/bones show normal range of motion.  The patient is mildly tender to palpation in the cervical and lumbar paraspinal musculature as well as the medial lateral joint lines of both knees.    Neurologic:  Motor strength:   5/5 muscle strength of the upper extremities bilateral and equal.  5/5 muscle strength of the lower extremities bilaterally and equal.     Sensation:   Sensation intact to pin prick in the bilateral upper extremities.  Sensation intact to pin prick in the bilateral lower extremities.     Provocative tests: Negative Hari sign bilaterally, 2+ patellar reflexes bilaterally.      Psychiatric: Mood and affect are normal.      Last Recorded Vitals  /73   Pulse 68   Resp 18   SpO2 96%     Relevant Results        Relevant Medications  Pain Medications              clopidogrel (Plavix) 75 mg tablet Take 1 tablet (75 mg) by mouth once daily.    FLUoxetine (PROzac) 20 mg capsule Take 1 capsule (20 mg) by mouth once daily.    HYDROcodone-acetaminophen (Norco) 5-325 mg tablet Take 1 tablet by mouth every 6 hours if needed for severe pain (7 - 10) for up to 28 days. Do not fill before June 2, 2025.    oxyCODONE-acetaminophen (Percocet) 5-325 mg tablet Take 1 tablet by mouth 3 times a day as needed for severe pain (7 - 10) for up to 28 days.    oxyCODONE-acetaminophen (Percocet) 5-325 mg tablet Take 1 tablet by mouth 3 times a day as needed for severe pain (7 - 10) for up to 28 days. Do not fill before October 24, 2024.    oxyCODONE-acetaminophen (Percocet) 5-325 mg tablet Take 1 tablet by mouth 3 times a day as needed for severe pain (7 - 10) for up to 28 days. Do not fill before November 21, 2024.    pregabalin (Lyrica) 200 mg capsule Take 1 capsule by mouth at bedtime            Last OARRS Review: No data recorded    I have personally reviewed the OARRS report for Shabbir Laurent I have considered  the risks of abuse, dependence, addiction and diversion       Assessment/Plan   Diagnoses and all orders for this visit:  Displacement of lumbar disc with radiculopathy  Arthritis of right hip      Plan  Norco 5/325 1 tablet 3 times daily  Discussed with the patient if pain returns we will consider bilateral L3 transforaminal epidural steroid injection       This note was generated with the aid of dictation software, there may be typos despite my attempts at proofreading.    Brandon Irizarry MD         [1]   Family History  Problem Relation Name Age of Onset    Arthritis Mother      Other (Stroke Syndrome) Mother      Arthritis Father      Other (CABG) Father      Sleep apnea Son

## 2025-06-18 ENCOUNTER — APPOINTMENT (OUTPATIENT)
Dept: PRIMARY CARE | Facility: CLINIC | Age: 75
End: 2025-06-18
Payer: MEDICARE

## 2025-06-19 ENCOUNTER — APPOINTMENT (OUTPATIENT)
Dept: PRIMARY CARE | Facility: CLINIC | Age: 75
End: 2025-06-19
Payer: MEDICARE

## 2025-06-20 ENCOUNTER — CLINICAL SUPPORT (OUTPATIENT)
Dept: PRIMARY CARE | Facility: CLINIC | Age: 75
End: 2025-06-20
Payer: MEDICARE

## 2025-06-20 DIAGNOSIS — Z95.2 MECHANICAL HEART VALVE PRESENT: ICD-10-CM

## 2025-06-20 LAB
POC INR: 3 (ref 0.9–1.1)
POC PROTHROMBIN TIME: ABNORMAL (ref 9.3–12.5)

## 2025-06-20 PROCEDURE — 85610 PROTHROMBIN TIME: CPT

## 2025-06-23 ENCOUNTER — APPOINTMENT (OUTPATIENT)
Dept: RHEUMATOLOGY | Facility: CLINIC | Age: 75
End: 2025-06-23
Payer: MEDICARE

## 2025-06-23 VITALS
BODY MASS INDEX: 37.03 KG/M2 | HEART RATE: 63 BPM | SYSTOLIC BLOOD PRESSURE: 120 MMHG | WEIGHT: 273 LBS | DIASTOLIC BLOOD PRESSURE: 74 MMHG | OXYGEN SATURATION: 93 %

## 2025-06-23 DIAGNOSIS — M54.50 CHRONIC LOW BACK PAIN WITHOUT SCIATICA, UNSPECIFIED BACK PAIN LATERALITY: Primary | ICD-10-CM

## 2025-06-23 DIAGNOSIS — M06.021: ICD-10-CM

## 2025-06-23 DIAGNOSIS — M25.562 CHRONIC PAIN OF BOTH KNEES: ICD-10-CM

## 2025-06-23 DIAGNOSIS — M51.360 DEGENERATION OF INTERVERTEBRAL DISC OF LUMBAR REGION WITH DISCOGENIC BACK PAIN: ICD-10-CM

## 2025-06-23 DIAGNOSIS — G89.29 CHRONIC PAIN OF BOTH KNEES: ICD-10-CM

## 2025-06-23 DIAGNOSIS — M25.561 CHRONIC PAIN OF BOTH KNEES: ICD-10-CM

## 2025-06-23 DIAGNOSIS — M51.16 DISPLACEMENT OF LUMBAR DISC WITH RADICULOPATHY: ICD-10-CM

## 2025-06-23 DIAGNOSIS — M54.16 LUMBAR RADICULOPATHY: ICD-10-CM

## 2025-06-23 DIAGNOSIS — G95.9 CERVICAL MYELOPATHY: ICD-10-CM

## 2025-06-23 DIAGNOSIS — G89.29 CHRONIC LOW BACK PAIN WITHOUT SCIATICA, UNSPECIFIED BACK PAIN LATERALITY: Primary | ICD-10-CM

## 2025-06-23 DIAGNOSIS — M48.062 LUMBAR STENOSIS WITH NEUROGENIC CLAUDICATION: ICD-10-CM

## 2025-06-23 PROCEDURE — 99214 OFFICE O/P EST MOD 30 MIN: CPT | Performed by: INTERNAL MEDICINE

## 2025-06-23 PROCEDURE — 1036F TOBACCO NON-USER: CPT | Performed by: INTERNAL MEDICINE

## 2025-06-23 PROCEDURE — 3074F SYST BP LT 130 MM HG: CPT | Performed by: INTERNAL MEDICINE

## 2025-06-23 PROCEDURE — 3044F HG A1C LEVEL LT 7.0%: CPT | Performed by: INTERNAL MEDICINE

## 2025-06-23 PROCEDURE — 1125F AMNT PAIN NOTED PAIN PRSNT: CPT | Performed by: INTERNAL MEDICINE

## 2025-06-23 PROCEDURE — 3078F DIAST BP <80 MM HG: CPT | Performed by: INTERNAL MEDICINE

## 2025-06-23 PROCEDURE — 20610 DRAIN/INJ JOINT/BURSA W/O US: CPT | Performed by: INTERNAL MEDICINE

## 2025-06-23 PROCEDURE — 4010F ACE/ARB THERAPY RXD/TAKEN: CPT | Performed by: INTERNAL MEDICINE

## 2025-06-23 PROCEDURE — 1159F MED LIST DOCD IN RCRD: CPT | Performed by: INTERNAL MEDICINE

## 2025-06-23 RX ORDER — EPINEPHRINE 0.3 MG/.3ML
0.3 INJECTION SUBCUTANEOUS EVERY 5 MIN PRN
OUTPATIENT
Start: 2025-09-16

## 2025-06-23 RX ORDER — ALBUTEROL SULFATE 0.83 MG/ML
3 SOLUTION RESPIRATORY (INHALATION) AS NEEDED
OUTPATIENT
Start: 2025-09-16

## 2025-06-23 RX ORDER — TRIAMCINOLONE ACETONIDE 40 MG/ML
40 INJECTION, SUSPENSION INTRA-ARTICULAR; INTRAMUSCULAR
Status: COMPLETED | OUTPATIENT
Start: 2025-06-23 | End: 2025-06-23

## 2025-06-23 RX ORDER — HEPARIN 100 UNIT/ML
500 SYRINGE INTRAVENOUS AS NEEDED
OUTPATIENT
Start: 2025-07-01

## 2025-06-23 RX ORDER — HEPARIN SODIUM,PORCINE/PF 10 UNIT/ML
50 SYRINGE (ML) INTRAVENOUS AS NEEDED
OUTPATIENT
Start: 2025-07-01

## 2025-06-23 RX ORDER — FAMOTIDINE 10 MG/ML
20 INJECTION, SOLUTION INTRAVENOUS ONCE AS NEEDED
OUTPATIENT
Start: 2025-07-01

## 2025-06-23 RX ORDER — ALBUTEROL SULFATE 0.83 MG/ML
3 SOLUTION RESPIRATORY (INHALATION) AS NEEDED
OUTPATIENT
Start: 2025-07-01

## 2025-06-23 RX ORDER — EPINEPHRINE 0.3 MG/.3ML
0.3 INJECTION SUBCUTANEOUS EVERY 5 MIN PRN
OUTPATIENT
Start: 2025-07-01

## 2025-06-23 RX ORDER — DIPHENHYDRAMINE HYDROCHLORIDE 50 MG/ML
50 INJECTION, SOLUTION INTRAMUSCULAR; INTRAVENOUS AS NEEDED
OUTPATIENT
Start: 2025-07-01

## 2025-06-23 RX ORDER — FAMOTIDINE 10 MG/ML
20 INJECTION, SOLUTION INTRAVENOUS ONCE AS NEEDED
OUTPATIENT
Start: 2025-09-16

## 2025-06-23 RX ORDER — DIPHENHYDRAMINE HYDROCHLORIDE 50 MG/ML
50 INJECTION, SOLUTION INTRAMUSCULAR; INTRAVENOUS AS NEEDED
OUTPATIENT
Start: 2025-09-16

## 2025-06-23 RX ADMIN — TRIAMCINOLONE ACETONIDE 40 MG: 40 INJECTION, SUSPENSION INTRA-ARTICULAR; INTRAMUSCULAR at 11:39

## 2025-06-23 ASSESSMENT — ENCOUNTER SYMPTOMS
SLEEP DISTURBANCE: 1
APNEA: 1
ABDOMINAL PAIN: 0
FATIGUE: 1

## 2025-06-23 ASSESSMENT — PATIENT HEALTH QUESTIONNAIRE - PHQ9
1. LITTLE INTEREST OR PLEASURE IN DOING THINGS: SEVERAL DAYS
2. FEELING DOWN, DEPRESSED OR HOPELESS: SEVERAL DAYS
SUM OF ALL RESPONSES TO PHQ9 QUESTIONS 1 AND 2: 2

## 2025-06-23 ASSESSMENT — PAIN SCALES - GENERAL: PAINLEVEL_OUTOF10: 7

## 2025-06-23 NOTE — PROGRESS NOTES
Subjective   Patient ID: Shabbir Laurent is a 74 y.o. male who presents for Follow-up.  HPI  Patient with history of seronegative inflammatory polyarthropathy previously seen by Dr. Garibay.  Also has a history of fibromyalgia, osteoarthritis, neuropathy, spinal stenosis, coronary artery disease, hypothyroidism and previously has been seen by neurology and pain management.  Previuosly was on Plaqunil and ssz.  Also has a history of coronary artery disease, mechanical aortic valve on anticoagulation, obstructive sleep apnea, diastolic heart failure, chronic low back pain.    Patient was last seen in January And at that time we had him continue on leflunomide.  He did have areas of swelling but was going to go in for injections.  We had discussed possibility of doing Biologics    He has been seen by pain management and did have bilateral L3 transforaminal injection in February.  He is currently on Lyrica and Buckner.    He has been seen by cardiology.  Has chronic issues in the left shoulder hips and knees.  He did have an injection with Dr. Napier  in April of the left shoulder.    The injection to the left shoulder was helpful.  The back injection was not as helpful as previous injections.  He is having pain in his legs and thinks it is coming from his knees which has significant arthritis.  It was last imaged at [facility] orth[facility]ics with Dr. Maldonado.  His previous rheumatologist had injected him with steroid shots before and did do an injection with hyaluronic acid injection which was unhelpful.    He has pain throughout his body.  He has pain in his elbows and hands.  He has swelling in his elbows and hands.  His wife feels that he has worms coming out of his elbows.   Review of Systems   Constitutional:  Positive for fatigue.   HENT:  Positive for hearing loss.    Respiratory:  Positive for apnea.    Cardiovascular:  Positive for leg swelling. Negative for chest pain.   Gastrointestinal:  Negative for abdominal  pain.   Musculoskeletal:         Per HPI   Psychiatric/Behavioral:  Positive for sleep disturbance.        Objective   Physical Exam  GEN: NAD A&O x3 appropriate affect cane  HENT:no enlarged glands or thyroid  EYES: no conjunctival redness, eyelids normal  LYMPH: no cervical lymphadenopathy  SKIN: no rashes, ulcers, or subcutaneous nodules  PULSES: +radials  TENDER POINTS: 0/18   MSK:  No current tenderness in the DIP PIP some fullness in the medial part of his right wrist   Swelling Right elbow warmth  Decreased range of motion bilateral shoulders  Hypertrophic changes in the bilateral knees  Mild edema in the lower extremities  Assessment/Plan     Seronegative inflammatory arthritis -previously has been on hydroxychloroquine, sulfasalazine, methotrexate   -Currently on leflunomide 20 mg and has had swelling in his hands and wrist.  Also has symptoms of degenerative disease as well.  He is open to possibility of doing a biologic we discussed about Anna Ferrell.  He has normal EF and history of coronary artery disease with history of MI last year.  Discussed side effects of medication including increased risk of infection.   - We have gotten blood work at his last visit to look at hepatitis B C and tuberculosis and these were all negative.  Osteoarthritis bilateral knees   - Injected medially with 40 mg of Kenalog each     Will have him come back again in 5 to 6 months or as needed    Patient ID: Shabbir Laurent is a 74 y.o. male.    Large Joint Injection/Arthrocentesis: bilateral knee on 6/23/2025 11:39 AM  Indications: pain  Details: 25 G needle, medial approach  Medications (Right): 40 mg triamcinolone acetonide 40 mg/mL  Medications (Left): 40 mg triamcinolone acetonide 40 mg/mL

## 2025-06-28 DIAGNOSIS — G60.9 HEREDITARY AND IDIOPATHIC NEUROPATHY: ICD-10-CM

## 2025-06-30 RX ORDER — PREGABALIN 200 MG/1
200 CAPSULE ORAL NIGHTLY
Qty: 90 CAPSULE | Refills: 0 | Status: SHIPPED | OUTPATIENT
Start: 2025-06-30

## 2025-07-01 ENCOUNTER — APPOINTMENT (OUTPATIENT)
Dept: HEMATOLOGY/ONCOLOGY | Facility: CLINIC | Age: 75
End: 2025-07-01
Payer: MEDICARE

## 2025-07-01 DIAGNOSIS — M06.021: ICD-10-CM

## 2025-07-11 ENCOUNTER — INFUSION (OUTPATIENT)
Dept: HEMATOLOGY/ONCOLOGY | Facility: CLINIC | Age: 75
End: 2025-07-11
Payer: MEDICARE

## 2025-07-11 VITALS
HEIGHT: 71 IN | WEIGHT: 271.17 LBS | OXYGEN SATURATION: 94 % | SYSTOLIC BLOOD PRESSURE: 120 MMHG | BODY MASS INDEX: 37.96 KG/M2 | HEART RATE: 64 BPM | TEMPERATURE: 97.2 F | DIASTOLIC BLOOD PRESSURE: 73 MMHG | RESPIRATION RATE: 16 BRPM

## 2025-07-11 DIAGNOSIS — M06.021: ICD-10-CM

## 2025-07-11 LAB
ALBUMIN SERPL BCP-MCNC: 4.4 G/DL (ref 3.4–5)
ALP SERPL-CCNC: 89 U/L (ref 33–136)
ALT SERPL W P-5'-P-CCNC: 17 U/L (ref 10–52)
ANION GAP SERPL CALC-SCNC: 13 MMOL/L (ref 10–20)
AST SERPL W P-5'-P-CCNC: 20 U/L (ref 9–39)
BASOPHILS # BLD AUTO: 0.01 X10*3/UL (ref 0–0.1)
BASOPHILS NFR BLD AUTO: 0.2 %
BILIRUB SERPL-MCNC: 0.5 MG/DL (ref 0–1.2)
BUN SERPL-MCNC: 22 MG/DL (ref 6–23)
CALCIUM SERPL-MCNC: 9.2 MG/DL (ref 8.6–10.3)
CHLORIDE SERPL-SCNC: 102 MMOL/L (ref 98–107)
CO2 SERPL-SCNC: 26 MMOL/L (ref 21–32)
CREAT SERPL-MCNC: 1.17 MG/DL (ref 0.5–1.3)
EGFRCR SERPLBLD CKD-EPI 2021: 65 ML/MIN/1.73M*2
EOSINOPHIL # BLD AUTO: 0.15 X10*3/UL (ref 0–0.4)
EOSINOPHIL NFR BLD AUTO: 3.2 %
ERYTHROCYTE [DISTWIDTH] IN BLOOD BY AUTOMATED COUNT: 13.9 % (ref 11.5–14.5)
GLUCOSE SERPL-MCNC: 150 MG/DL (ref 74–99)
HBV SURFACE AG SERPL QL IA: NONREACTIVE
HCT VFR BLD AUTO: 43.5 % (ref 41–52)
HCV AB SER QL: NONREACTIVE
HGB BLD-MCNC: 14.9 G/DL (ref 13.5–17.5)
IMM GRANULOCYTES # BLD AUTO: 0.01 X10*3/UL (ref 0–0.5)
IMM GRANULOCYTES NFR BLD AUTO: 0.2 % (ref 0–0.9)
LYMPHOCYTES # BLD AUTO: 1.13 X10*3/UL (ref 0.8–3)
LYMPHOCYTES NFR BLD AUTO: 23.8 %
MCH RBC QN AUTO: 31.7 PG (ref 26–34)
MCHC RBC AUTO-ENTMCNC: 34.3 G/DL (ref 32–36)
MCV RBC AUTO: 93 FL (ref 80–100)
MONOCYTES # BLD AUTO: 0.56 X10*3/UL (ref 0.05–0.8)
MONOCYTES NFR BLD AUTO: 11.8 %
NEUTROPHILS # BLD AUTO: 2.89 X10*3/UL (ref 1.6–5.5)
NEUTROPHILS NFR BLD AUTO: 60.8 %
PLATELET # BLD AUTO: 210 X10*3/UL (ref 150–450)
POTASSIUM SERPL-SCNC: 3.9 MMOL/L (ref 3.5–5.3)
PROT SERPL-MCNC: 7.3 G/DL (ref 6.4–8.2)
RBC # BLD AUTO: 4.7 X10*6/UL (ref 4.5–5.9)
SODIUM SERPL-SCNC: 137 MMOL/L (ref 136–145)
WBC # BLD AUTO: 4.8 X10*3/UL (ref 4.4–11.3)

## 2025-07-11 PROCEDURE — 2500000004 HC RX 250 GENERAL PHARMACY W/ HCPCS (ALT 636 FOR OP/ED): Mod: TB | Performed by: INTERNAL MEDICINE

## 2025-07-11 PROCEDURE — 87340 HEPATITIS B SURFACE AG IA: CPT | Mod: GEALAB

## 2025-07-11 PROCEDURE — 86481 TB AG RESPONSE T-CELL SUSP: CPT

## 2025-07-11 PROCEDURE — 96375 TX/PRO/DX INJ NEW DRUG ADDON: CPT | Mod: INF

## 2025-07-11 PROCEDURE — 96365 THER/PROPH/DIAG IV INF INIT: CPT | Mod: INF

## 2025-07-11 PROCEDURE — 80053 COMPREHEN METABOLIC PANEL: CPT

## 2025-07-11 PROCEDURE — 85025 COMPLETE CBC W/AUTO DIFF WBC: CPT

## 2025-07-11 PROCEDURE — 86803 HEPATITIS C AB TEST: CPT | Mod: GEALAB

## 2025-07-11 PROCEDURE — 2500000004 HC RX 250 GENERAL PHARMACY W/ HCPCS (ALT 636 FOR OP/ED): Performed by: INTERNAL MEDICINE

## 2025-07-11 RX ORDER — FAMOTIDINE 10 MG/ML
20 INJECTION, SOLUTION INTRAVENOUS ONCE AS NEEDED
OUTPATIENT
Start: 2025-08-08

## 2025-07-11 RX ORDER — EPINEPHRINE 0.3 MG/.3ML
0.3 INJECTION SUBCUTANEOUS EVERY 5 MIN PRN
OUTPATIENT
Start: 2025-08-08

## 2025-07-11 RX ORDER — EPINEPHRINE 0.3 MG/.3ML
0.3 INJECTION SUBCUTANEOUS EVERY 5 MIN PRN
Status: DISCONTINUED | OUTPATIENT
Start: 2025-07-11 | End: 2025-07-11 | Stop reason: HOSPADM

## 2025-07-11 RX ORDER — FAMOTIDINE 10 MG/ML
20 INJECTION, SOLUTION INTRAVENOUS ONCE AS NEEDED
Status: DISCONTINUED | OUTPATIENT
Start: 2025-07-11 | End: 2025-07-11 | Stop reason: HOSPADM

## 2025-07-11 RX ORDER — HEPARIN SODIUM,PORCINE/PF 10 UNIT/ML
50 SYRINGE (ML) INTRAVENOUS AS NEEDED
OUTPATIENT
Start: 2025-07-11

## 2025-07-11 RX ORDER — DIPHENHYDRAMINE HYDROCHLORIDE 50 MG/ML
50 INJECTION, SOLUTION INTRAMUSCULAR; INTRAVENOUS AS NEEDED
OUTPATIENT
Start: 2025-08-08

## 2025-07-11 RX ORDER — HEPARIN 100 UNIT/ML
500 SYRINGE INTRAVENOUS AS NEEDED
OUTPATIENT
Start: 2025-07-11

## 2025-07-11 RX ORDER — ALBUTEROL SULFATE 0.83 MG/ML
3 SOLUTION RESPIRATORY (INHALATION) AS NEEDED
Status: DISCONTINUED | OUTPATIENT
Start: 2025-07-11 | End: 2025-07-11 | Stop reason: HOSPADM

## 2025-07-11 RX ORDER — DIPHENHYDRAMINE HYDROCHLORIDE 50 MG/ML
50 INJECTION, SOLUTION INTRAMUSCULAR; INTRAVENOUS AS NEEDED
Status: DISCONTINUED | OUTPATIENT
Start: 2025-07-11 | End: 2025-07-11 | Stop reason: HOSPADM

## 2025-07-11 RX ORDER — ALBUTEROL SULFATE 0.83 MG/ML
3 SOLUTION RESPIRATORY (INHALATION) AS NEEDED
OUTPATIENT
Start: 2025-08-08

## 2025-07-11 RX ADMIN — METHYLPREDNISOLONE SODIUM SUCCINATE 100 MG: 125 INJECTION, POWDER, FOR SOLUTION INTRAMUSCULAR; INTRAVENOUS at 10:49

## 2025-07-11 RX ADMIN — GOLIMUMAB 243.75 MG: 50 SOLUTION INTRAVENOUS at 11:27

## 2025-07-11 ASSESSMENT — PAIN SCALES - GENERAL: PAINLEVEL_OUTOF10: 7

## 2025-07-11 NOTE — PROGRESS NOTES
Shabbir Laurent self ambulated to [facility] for golimumab .  Pt tolerated treatment without incident.  Gait steady upon DC home.  Shabbir Laurent denies further questions/concerns/comments and agrees to POC going forward.

## 2025-07-14 LAB
NIL(NEG) CONTROL SPOT COUNT: NORMAL
PANEL A SPOT COUNT: 0
PANEL B SPOT COUNT: 1
POS CONTROL SPOT COUNT: NORMAL
T-SPOT. TB INTERPRETATION: NEGATIVE

## 2025-07-18 ENCOUNTER — TELEPHONE (OUTPATIENT)
Dept: PRIMARY CARE | Facility: CLINIC | Age: 75
End: 2025-07-18

## 2025-07-18 ENCOUNTER — APPOINTMENT (OUTPATIENT)
Dept: PRIMARY CARE | Facility: CLINIC | Age: 75
End: 2025-07-18
Payer: COMMERCIAL

## 2025-07-18 DIAGNOSIS — M13.0 POLYARTHRITIS: ICD-10-CM

## 2025-07-18 DIAGNOSIS — M06.4 INFLAMMATORY POLYARTHROPATHY (MULTI): ICD-10-CM

## 2025-07-18 DIAGNOSIS — Z95.2 MECHANICAL HEART VALVE PRESENT: ICD-10-CM

## 2025-07-18 LAB
POC INR: 2.3 (ref 0.9–1.1)
POC PROTHROMBIN TIME: ABNORMAL (ref 9.3–12.5)

## 2025-07-18 PROCEDURE — 85610 PROTHROMBIN TIME: CPT | Performed by: FAMILY MEDICINE

## 2025-07-18 RX ORDER — LEFLUNOMIDE 20 MG/1
20 TABLET ORAL DAILY
Qty: 90 TABLET | Refills: 3 | Status: SHIPPED | OUTPATIENT
Start: 2025-07-18

## 2025-07-21 DIAGNOSIS — M06.4 INFLAMMATORY POLYARTHROPATHY (MULTI): ICD-10-CM

## 2025-07-21 DIAGNOSIS — M13.0 POLYARTHRITIS: ICD-10-CM

## 2025-07-21 RX ORDER — LEFLUNOMIDE 20 MG/1
20 TABLET ORAL DAILY
Qty: 90 TABLET | Refills: 3 | Status: SHIPPED | OUTPATIENT
Start: 2025-07-21

## 2025-07-29 ENCOUNTER — APPOINTMENT (OUTPATIENT)
Dept: HEMATOLOGY/ONCOLOGY | Facility: CLINIC | Age: 75
End: 2025-07-29
Payer: COMMERCIAL

## 2025-08-01 ENCOUNTER — APPOINTMENT (OUTPATIENT)
Dept: PRIMARY CARE | Facility: CLINIC | Age: 75
End: 2025-08-01
Payer: MEDICARE

## 2025-08-01 DIAGNOSIS — Z95.2 MECHANICAL HEART VALVE PRESENT: Primary | ICD-10-CM

## 2025-08-01 LAB
POC INR: 3.4 (ref 2.5–3.5)
POC PROTHROMBIN TIME: 41.3 (ref 9.3–12.5)

## 2025-08-01 PROCEDURE — 99211 OFF/OP EST MAY X REQ PHY/QHP: CPT | Performed by: FAMILY MEDICINE

## 2025-08-01 PROCEDURE — 85610 PROTHROMBIN TIME: CPT | Performed by: FAMILY MEDICINE

## 2025-08-08 ENCOUNTER — INFUSION (OUTPATIENT)
Dept: HEMATOLOGY/ONCOLOGY | Facility: CLINIC | Age: 75
End: 2025-08-08
Payer: MEDICARE

## 2025-08-08 VITALS
DIASTOLIC BLOOD PRESSURE: 66 MMHG | OXYGEN SATURATION: 95 % | HEART RATE: 57 BPM | SYSTOLIC BLOOD PRESSURE: 102 MMHG | TEMPERATURE: 97.5 F | BODY MASS INDEX: 38.61 KG/M2 | WEIGHT: 274.25 LBS | RESPIRATION RATE: 18 BRPM

## 2025-08-08 DIAGNOSIS — M06.021: ICD-10-CM

## 2025-08-08 PROCEDURE — 2500000004 HC RX 250 GENERAL PHARMACY W/ HCPCS (ALT 636 FOR OP/ED): Mod: JW,TB | Performed by: INTERNAL MEDICINE

## 2025-08-08 PROCEDURE — 2500000004 HC RX 250 GENERAL PHARMACY W/ HCPCS (ALT 636 FOR OP/ED): Performed by: INTERNAL MEDICINE

## 2025-08-08 PROCEDURE — 96365 THER/PROPH/DIAG IV INF INIT: CPT | Mod: INF

## 2025-08-08 PROCEDURE — 96375 TX/PRO/DX INJ NEW DRUG ADDON: CPT | Mod: INF

## 2025-08-08 RX ORDER — FAMOTIDINE 10 MG/ML
20 INJECTION, SOLUTION INTRAVENOUS ONCE AS NEEDED
OUTPATIENT
Start: 2025-08-08

## 2025-08-08 RX ORDER — HEPARIN SODIUM,PORCINE/PF 10 UNIT/ML
50 SYRINGE (ML) INTRAVENOUS AS NEEDED
OUTPATIENT
Start: 2025-08-08

## 2025-08-08 RX ORDER — HEPARIN 100 UNIT/ML
500 SYRINGE INTRAVENOUS AS NEEDED
OUTPATIENT
Start: 2025-08-08

## 2025-08-08 RX ORDER — EPINEPHRINE 0.3 MG/.3ML
0.3 INJECTION SUBCUTANEOUS EVERY 5 MIN PRN
OUTPATIENT
Start: 2025-08-08

## 2025-08-08 RX ORDER — EPINEPHRINE 0.3 MG/.3ML
0.3 INJECTION SUBCUTANEOUS EVERY 5 MIN PRN
Status: DISCONTINUED | OUTPATIENT
Start: 2025-08-08 | End: 2025-08-08 | Stop reason: HOSPADM

## 2025-08-08 RX ORDER — ALBUTEROL SULFATE 0.83 MG/ML
3 SOLUTION RESPIRATORY (INHALATION) AS NEEDED
Status: DISCONTINUED | OUTPATIENT
Start: 2025-08-08 | End: 2025-08-08 | Stop reason: HOSPADM

## 2025-08-08 RX ORDER — ALBUTEROL SULFATE 0.83 MG/ML
3 SOLUTION RESPIRATORY (INHALATION) AS NEEDED
OUTPATIENT
Start: 2025-08-08

## 2025-08-08 RX ORDER — FAMOTIDINE 10 MG/ML
20 INJECTION, SOLUTION INTRAVENOUS ONCE AS NEEDED
Status: DISCONTINUED | OUTPATIENT
Start: 2025-08-08 | End: 2025-08-08 | Stop reason: HOSPADM

## 2025-08-08 RX ORDER — DIPHENHYDRAMINE HYDROCHLORIDE 50 MG/ML
50 INJECTION, SOLUTION INTRAMUSCULAR; INTRAVENOUS AS NEEDED
Status: DISCONTINUED | OUTPATIENT
Start: 2025-08-08 | End: 2025-08-08 | Stop reason: HOSPADM

## 2025-08-08 RX ORDER — DIPHENHYDRAMINE HYDROCHLORIDE 50 MG/ML
50 INJECTION, SOLUTION INTRAMUSCULAR; INTRAVENOUS AS NEEDED
OUTPATIENT
Start: 2025-08-08

## 2025-08-08 RX ADMIN — METHYLPREDNISOLONE SODIUM SUCCINATE 100 MG: 125 INJECTION, POWDER, FOR SOLUTION INTRAMUSCULAR; INTRAVENOUS at 10:26

## 2025-08-08 RX ADMIN — GOLIMUMAB 250 MG: 50 SOLUTION INTRAVENOUS at 10:54

## 2025-08-08 ASSESSMENT — PAIN SCALES - GENERAL: PAINLEVEL_OUTOF10: 8

## 2025-08-08 NOTE — SIGNIFICANT EVENT
08/08/25 1124   Prechemo Checklist   Has the patient been in the hospital, ED, or urgent care since last date of service No   Chemo/Immuno Consent Completed and Signed Not applicable   Protocol/Indications Verified Yes   Confirmed to previous date/time of medication Yes   Compared to previous dose Yes   All medications are dated accurately Yes   Pregnancy Test Negative Not applicable   Parameters Met Yes   BSA/Weight-Height Verified Yes   Dose Calculations Verified (current, total, cumulative) Yes

## 2025-08-08 NOTE — PROGRESS NOTES
Patient arrived for second Golimumab infusion, tolerated without incident. Gave and reviewed treatment scheduled with patient and spouse who verbalized understanding. Shabbir Laurent denied any questions or concerns upon discharge.

## 2025-08-17 ENCOUNTER — PATIENT MESSAGE (OUTPATIENT)
Dept: RHEUMATOLOGY | Facility: CLINIC | Age: 75
End: 2025-08-17
Payer: COMMERCIAL

## 2025-08-25 DIAGNOSIS — E11.9 CONTROLLED TYPE 2 DIABETES MELLITUS WITHOUT COMPLICATION, WITHOUT LONG-TERM CURRENT USE OF INSULIN: ICD-10-CM

## 2025-08-25 DIAGNOSIS — M06.4 INFLAMMATORY POLYARTHROPATHY (MULTI): ICD-10-CM

## 2025-08-25 DIAGNOSIS — G60.9 HEREDITARY AND IDIOPATHIC NEUROPATHY: ICD-10-CM

## 2025-08-25 DIAGNOSIS — I25.10 CORONARY ARTERY DISEASE INVOLVING NATIVE CORONARY ARTERY OF NATIVE HEART WITHOUT ANGINA PECTORIS: ICD-10-CM

## 2025-08-25 RX ORDER — FOLIC ACID 1 MG/1
1 TABLET ORAL
Qty: 90 TABLET | Refills: 3 | Status: SHIPPED | OUTPATIENT
Start: 2025-08-25

## 2025-08-25 RX ORDER — LANCETS 33 GAUGE
EACH MISCELLANEOUS
Qty: 100 EACH | Refills: 3 | Status: SHIPPED | OUTPATIENT
Start: 2025-08-25

## 2025-08-25 RX ORDER — BLOOD SUGAR DIAGNOSTIC
STRIP MISCELLANEOUS
Qty: 100 STRIP | Refills: 3 | Status: SHIPPED | OUTPATIENT
Start: 2025-08-25

## 2025-08-27 ENCOUNTER — APPOINTMENT (OUTPATIENT)
Dept: PRIMARY CARE | Facility: CLINIC | Age: 75
End: 2025-08-27
Payer: COMMERCIAL

## 2025-08-27 DIAGNOSIS — Z95.2 MECHANICAL HEART VALVE PRESENT: ICD-10-CM

## 2025-08-27 LAB
POC INR: 3.3 (ref 0.9–1.1)
POC PROTHROMBIN TIME: ABNORMAL (ref 9.3–12.5)

## 2025-08-27 PROCEDURE — 85610 PROTHROMBIN TIME: CPT | Performed by: FAMILY MEDICINE

## 2025-08-28 ENCOUNTER — APPOINTMENT (OUTPATIENT)
Dept: PRIMARY CARE | Facility: CLINIC | Age: 75
End: 2025-08-28
Payer: COMMERCIAL

## 2025-09-05 ENCOUNTER — HOSPITAL ENCOUNTER (OUTPATIENT)
Dept: RADIOLOGY | Facility: CLINIC | Age: 75
Discharge: HOME | End: 2025-09-05
Payer: MEDICARE

## 2025-09-05 ENCOUNTER — APPOINTMENT (OUTPATIENT)
Dept: PRIMARY CARE | Facility: CLINIC | Age: 75
End: 2025-09-05
Payer: MEDICARE

## 2025-09-05 DIAGNOSIS — M54.41 ACUTE MIDLINE LOW BACK PAIN WITH BILATERAL SCIATICA: ICD-10-CM

## 2025-09-05 DIAGNOSIS — M54.42 ACUTE MIDLINE LOW BACK PAIN WITH BILATERAL SCIATICA: ICD-10-CM

## 2025-09-05 PROCEDURE — 72110 X-RAY EXAM L-2 SPINE 4/>VWS: CPT

## 2025-09-16 ENCOUNTER — APPOINTMENT (OUTPATIENT)
Dept: HEMATOLOGY/ONCOLOGY | Facility: CLINIC | Age: 75
End: 2025-09-16
Payer: COMMERCIAL

## 2025-09-24 ENCOUNTER — APPOINTMENT (OUTPATIENT)
Dept: PRIMARY CARE | Facility: CLINIC | Age: 75
End: 2025-09-24
Payer: COMMERCIAL

## 2025-10-20 ENCOUNTER — APPOINTMENT (OUTPATIENT)
Dept: RHEUMATOLOGY | Facility: CLINIC | Age: 75
End: 2025-10-20
Payer: MEDICARE

## 2025-11-07 ENCOUNTER — APPOINTMENT (OUTPATIENT)
Dept: PRIMARY CARE | Facility: CLINIC | Age: 75
End: 2025-11-07
Payer: COMMERCIAL

## (undated) DEVICE — TR BAND, RADIAL COMPRESSION, STANDARD, 24CM

## (undated) DEVICE — GUIDEWIRE, INQWIRE, 3MM J, .035 X 210CM, FIXED

## (undated) DEVICE — INTRODUCER SHEATH, GLIDESHEATH, 6FR 10CM

## (undated) DEVICE — CATHETER, GUIDING, LAUNCHER, 6FR EBU 3.5

## (undated) DEVICE — NEEDLE, ENTRY, PERCUTANEOUS, 21 G X 2.5 CM

## (undated) DEVICE — GUIDEWIRE, RUN THROUGH WIRE, 180CM

## (undated) DEVICE — Device

## (undated) DEVICE — CATHETER, GUIDELINER, 6FR V2

## (undated) DEVICE — CATHETER, BALLOON, NC EUPHORA NONCOMPLIANT 2.5 X 15 X 142CM

## (undated) DEVICE — CATHETER, GUIDING, LAUNCHER, 6FR, JL 3.5

## (undated) DEVICE — CATHETER, BALLOON, NC EUPHORA NONCOMPLIANT 2.25 X 12 X 142CM

## (undated) DEVICE — CATHETER, ANGIO, IMPULSE, FL3.5, 6 FR X 100 CM

## (undated) DEVICE — CATHETER, ANGIO, IMPULSE, FR4, 6 FR X 100 CM

## (undated) DEVICE — CATHETER, BALLOON, NC EUPHORA NONCOMPLIANT 3.0 X 15 X 142CM

## (undated) DEVICE — INFLATION DEVICE, BASIXCOMPAX, 30 ATM/BAR, 20ML  MAP152

## (undated) DEVICE — ANGIO KIT, LEFT HEART, LF, CUSTOM